# Patient Record
Sex: FEMALE | Race: WHITE | NOT HISPANIC OR LATINO | Employment: UNEMPLOYED | URBAN - METROPOLITAN AREA
[De-identification: names, ages, dates, MRNs, and addresses within clinical notes are randomized per-mention and may not be internally consistent; named-entity substitution may affect disease eponyms.]

---

## 2017-02-21 ENCOUNTER — OFFICE VISIT (OUTPATIENT)
Dept: MEDICAL GROUP | Facility: MEDICAL CENTER | Age: 37
End: 2017-02-21
Attending: NURSE PRACTITIONER
Payer: MEDICAID

## 2017-02-21 VITALS
SYSTOLIC BLOOD PRESSURE: 100 MMHG | RESPIRATION RATE: 16 BRPM | OXYGEN SATURATION: 96 % | HEART RATE: 88 BPM | BODY MASS INDEX: 42.36 KG/M2 | HEIGHT: 69 IN | WEIGHT: 286 LBS | DIASTOLIC BLOOD PRESSURE: 60 MMHG | TEMPERATURE: 97.7 F

## 2017-02-21 DIAGNOSIS — R13.10 DYSPHAGIA, UNSPECIFIED TYPE: ICD-10-CM

## 2017-02-21 DIAGNOSIS — Z98.890: ICD-10-CM

## 2017-02-21 DIAGNOSIS — R09.81 NASAL CONGESTION: ICD-10-CM

## 2017-02-21 DIAGNOSIS — K22.0 CRICOPHARYNGEAL ACHALASIA: ICD-10-CM

## 2017-02-21 PROCEDURE — 99214 OFFICE O/P EST MOD 30 MIN: CPT | Performed by: NURSE PRACTITIONER

## 2017-02-21 RX ORDER — ZOLPIDEM TARTRATE 5 MG/1
TABLET ORAL
COMMUNITY
Start: 2017-02-07 | End: 2017-11-30

## 2017-02-21 RX ORDER — TIOTROPIUM BROMIDE 18 UG/1
CAPSULE ORAL; RESPIRATORY (INHALATION)
Qty: 30 CAP | Refills: 11 | Status: SHIPPED | OUTPATIENT
Start: 2017-02-21 | End: 2017-06-13

## 2017-02-21 RX ORDER — GABAPENTIN 100 MG/1
CAPSULE ORAL
COMMUNITY
Start: 2017-02-07 | End: 2017-11-30

## 2017-02-21 RX ORDER — PRAZOSIN HYDROCHLORIDE 5 MG/1
10 CAPSULE ORAL EVERY EVENING
COMMUNITY
Start: 2017-02-10 | End: 2023-11-26

## 2017-02-21 ASSESSMENT — PAIN SCALES - GENERAL: PAINLEVEL: NO PAIN

## 2017-02-21 NOTE — MR AVS SNAPSHOT
"        Tracy Calderon   2017 12:50 PM   Office Visit   MRN: [id number]    [facility]   Dept Phone:  [phone]    Description:  Female : 1980   Provider:  MARY CARMEN SimpsonPMASON           Reason for Visit     Breathing Problem pt.states having problem breathing on \"one side of nose\"      Allergies as of 2017     Allergen Noted Reactions    Penicillins 2007   Hives    Vicodin [Hydrocodone-Acetaminophen] 2015       'WIRES ME, DO NOT LIKE HOW IT MAKES ME FEEL',HYPERACTIVITY      You were diagnosed with     Nasal congestion   [126794]       S/P operation on nasal septum   [744952]       Dysphagia, unspecified type   [2240108]       Cricopharyngeal achalasia   [272572]         Vital Signs     Blood Pressure Pulse Temperature Respirations Height Weight    100/60 mmHg 88 36.5 °C (97.7 °F) 16 1.753 m (5' 9.02\") 129.729 kg (286 lb)    Body Mass Index Oxygen Saturation Last Menstrual Period Breastfeeding? Smoking Status       42.22 kg/m2 96% 2017 No Current Every Day Smoker       Basic Information     Date Of Birth Sex Race Ethnicity Preferred Language    1980 Female White Non- English      Problem List              ICD-10-CM Priority Class Noted - Resolved    GERD (gastroesophageal reflux disease) K21.9   2013 - Present    Tobacco abuse Z72.0   2013 - Present    Chest wall pain R07.89   2013 - Present    Obesity E66.9   2013 - Present    COPD (chronic obstructive pulmonary disease) (CMS-HCC) J44.9   2015 - Present    Anxiety F41.9   2015 - Present    History of substance abuse Z87.898   2015 - Present    Hyperlipidemia E78.5   2015 - Present    S/P nasal septoplasty Z98.890   2015 - Present    Bilateral edema of lower extremity R60.0   8/10/2015 - Present    Nocturnal sleep-related eating disorder G47.8   2015 - Present      Health Maintenance        Date Due Completion Dates    IMM DTaP/Tdap/Td " Vaccine (1 - Tdap) 8/24/2021 (Originally 8/25/2011) 8/24/2011    PAP SMEAR 3/31/2018 3/31/2015, 3/31/2015            Current Immunizations     Influenza TIV (IM) 10/29/2013    Influenza Vaccine Quad Inj (Pf) 1/28/2016  1:09 PM    Influenza Vaccine Quad Inj (Preserved) 10/20/2016, 10/28/2014    Pneumococcal polysaccharide vaccine (PPSV-23) 11/23/2016    TD Vaccine 8/24/2011 10:36 AM      Below and/or attached are the medications your provider expects you to take. Review all of your home medications and newly ordered medications with your provider and/or pharmacist. Follow medication instructions as directed by your provider and/or pharmacist. Please keep your medication list with you and share with your provider. Update the information when medications are discontinued, doses are changed, or new medications (including over-the-counter products) are added; and carry medication information at all times in the event of emergency situations     Allergies:  PENICILLINS - Hives     VICODIN - (reactions not documented)               Medications  Valid as of: February 21, 2017 -  1:19 PM    Generic Name Brand Name Tablet Size Instructions for use    Albuterol Sulfate (Nebu Soln) PROVENTIL 2.5mg/3ml 3 mL by Nebulization route every four hours as needed for Shortness of Breath.        Albuterol Sulfate (Aero Soln) albuterol 108 (90 BASE) MCG/ACT Inhale 2 Puffs by mouth every 6 hours as needed for Shortness of Breath.        Atorvastatin Calcium (Tab) LIPITOR 40 MG TAKE ONE TABLET BY MOUTH IN THE EVENING (INCREASED  DOSE)        Citalopram Hydrobromide (Tab) CELEXA 40 MG         Furosemide (Tab) LASIX 20 MG TAKE ONE TABLET BY MOUTH ONCE DAILY        Gabapentin (Cap) NEURONTIN 100 MG         HydrOXYzine HCl (Tab) ATARAX 25 MG Take 25 mg by mouth 2 Times a Day.        Methocarbamol (Tab) ROBAXIN 500 MG TAKE ONE TABLET BY MOUTH TWICE DAILY AS NEEDED FOR  MUSCLE  TENSION/PAIN        Mirtazapine (Tab) REMERON 15 MG         Nicotine  Polacrilex (Gum) NICORETTE 4 MG Take 4 mg by mouth every 2 hours as needed.        Prazosin HCl (Cap) MINIPRESS 5 MG         RaNITidine HCl (Tab) ZANTAC 150 MG TAKE ONE TABLET BY MOUTH TWICE DAILY WITH MEALS        Tiotropium Bromide Monohydrate (Cap) SPIRIVA 18 MCG INHALE ONE DOSE BY MOUTH ONCE DAILY        Triamcinolone Acetonide (Aerosol) NASACORT 55 MCG/ACT [place] 2 Sprays in nose every day.        Zolpidem Tartrate (Tab) AMBIEN 5 MG         .                 Medicines prescribed today were sent to:     [facility] [address] [facility] ([place]), [place] - [address]    [address] [address] ([place]) [place] [address]    Phone: [phone] Fax: [phone]    Open 24 Hours?: No      Medication refill instructions:       If your prescription bottle indicates you have medication refills left, it is not necessary to call your provider’s office. Please contact your pharmacy and they will refill your medication.    If your prescription bottle indicates you do not have any refills left, you may request refills at any time through one of the following ways: The online [employer] system (except Urgent Care), by calling your provider’s office, or by asking your pharmacy to contact your provider’s office with a refill request. Medication refills are processed only during regular business hours and may not be available until the next business day. Your provider may request additional information or to have a follow-up visit with you prior to refilling your medication.   *Please Note: Medication refills are assigned a new Rx number when refilled electronically. Your pharmacy may indicate that no refills were authorized even though a new prescription for the same medication is available at the pharmacy. Please request the medicine by name with the pharmacy before contacting your provider for a refill.           MyChart Status: Patient Declined

## 2017-02-21 NOTE — PROGRESS NOTES
"Subjective:     Chief Complaint   Patient presents with   • Nasal Congestion     R side x 10 days     Tracy Calderon is a 36 y.o. female here today with 2 concerns.       She reports right sided nasal congestion that has been coming and going for 10 days. She denies URI or cold symptoms during this time. She denies sneezing or nasal drainage. She is not taking any nasal sprays and has not tried anything for this problem. She does have a history a deviated septum and underwent surgery to repair this a couple of years ago.     She also reports ongoing problems with swallowing. She states she feels like food gets stuck in her chest area and it causes her to nearly choke. She states this occurs with solids but also with liquids if they are very cold. These symptoms have been present for several years and she had a barium swallow in the past that showed \"Poor relaxation of the cricopharyngeus muscle, possibly representing cricopharyngeal achalasia.\" She was seen by Dr. Pierre for this problem and had a minor surgery but states that her symptoms never improved. She does not recall having an upper endoscopy. She reports she has not followed up since.    The patient has known COPD. +tobacco abuse. Pt reporting mild SOB with exertion. Pt reports taking inhalers as prescribed. Denies cough, severe SOB, CP, recent illness, fevers/chills. She is requesting a refill of Spiriva today.      Current medicines (including changes today)  Current Outpatient Prescriptions   Medication Sig Dispense Refill   • tiotropium (SPIRIVA HANDIHALER) 18 MCG Cap INHALE ONE DOSE BY MOUTH ONCE DAILY 30 Cap 11   • gabapentin (NEURONTIN) 100 MG Cap      • prazosin (MINIPRESS) 5 MG Cap      • zolpidem (AMBIEN) 5 MG Tab      • atorvastatin (LIPITOR) 40 MG Tab TAKE ONE TABLET BY MOUTH IN THE EVENING (INCREASED  DOSE) 30 Tab 3   • methocarbamol (ROBAXIN) 500 MG Tab TAKE ONE TABLET BY MOUTH TWICE DAILY AS NEEDED FOR  MUSCLE  TENSION/PAIN 60 Tab 3 " "  • furosemide (LASIX) 20 MG Tab TAKE ONE TABLET BY MOUTH ONCE DAILY 30 Tab 3   • triamcinolone (NASACORT) 55 MCG/ACT nasal inhaler Spray 2 Sprays in nose every day. 1 Bottle 3   • citalopram (CELEXA) 40 MG Tab      • mirtazapine (REMERON) 15 MG Tab      • hydrOXYzine (ATARAX) 25 MG Tab Take 25 mg by mouth 2 Times a Day.     • ranitidine (ZANTAC) 150 MG Tab TAKE ONE TABLET BY MOUTH TWICE DAILY WITH MEALS 60 Tab 6   • albuterol (PROVENTIL) 2.5mg/3ml Nebu Soln solution for nebulization 3 mL by Nebulization route every four hours as needed for Shortness of Breath. 75 mL 3   • albuterol 108 (90 BASE) MCG/ACT Aero Soln inhalation aerosol Inhale 2 Puffs by mouth every 6 hours as needed for Shortness of Breath. 8.5 g 3   • nicotine polacrilex (NICORETTE) 4 MG gum Take 4 mg by mouth every 2 hours as needed. 270 Each 3     No current facility-administered medications for this visit.     She  has a past medical history of ETOH abuse; Anxiety; COPD (chronic obstructive pulmonary disease) (CMS-[facility]) (2/17/2015); History of substance abuse (2/17/2015); Hyperlipidemia (2/17/2015); Hyperlipidemia (2/17/2015); Cricopharyngeal achalasia (3/12/2015); Heart burn; Indigestion; Breath shortness; Snoring; Asthma; Anesthesia; Psychiatric disorder; Bipolar disorder (CMS-HCC); and Depression. She also has no past medical history of Hypertension or Diabetes.      Current medications, allergies and problems list reviewed and updated in [employer].      ROS   Review of systems as documented above in history of present illness.         Objective:     Blood pressure 100/60, pulse 88, temperature 36.5 °C (97.7 °F), resp. rate 16, height 1.753 m (5' 9.02\"), weight 129.729 kg (286 lb), last menstrual period 02/16/2017, SpO2 96 %, not currently breastfeeding. Body mass index is 42.22 kg/(m^2).     Physical Exam:  Alert, oriented in no acute distress.  Eye contact is good, speech goal directed, affect calm  HEENT: conjunctiva non-injected, sclera " non-icteric.  Pinna normal. TM pearly gray.   Oral mucous membranes pink and moist with no lesions.  +right sided edematous nasal mucosa noted.  Neck: No adenopathy or masses in the neck or supraclavicular regions. No JVD. No thyromegaly.  Lungs: clear to auscultation bilaterally with good excursion.  CV: regular rate and rhythm.  Ext: no edema  Skin: no rashes or lesions in visible areas.        Assessment and Plan:   The following treatment plan was discussed     1. Nasal congestion  This is a new problem    Recommended that she try Nasacort (as previously prescribed) 2 sprays in the affected nostril for 5-7 days.     2. S/P operation on nasal septum     3. Dysphagia, unspecified type  Not controlled.  She reports ongoing difficulty swallowing with past findings of cricopharyngeal achalasia. She had a minor surgery or injection and I have told her that she should FU with Dr. Pierre as this likely needs to be repeated.  Recommended soft diet with choking precautions until seen. Avoid very cold fluids if this aggravates symptoms.      4. Cricopharyngeal achalasia  As above.         Followup: Return in about 3 months (around 5/21/2017), or if symptoms worsen or fail to improve.          Please note that this dictation was created using voice recognition software. Every reasonable attempt has been made to correct obvious errors, however there may be errors of grammar and possibly content that were not discovered before finalizing the note.

## 2017-03-07 ENCOUNTER — OFFICE VISIT (OUTPATIENT)
Dept: MEDICAL GROUP | Facility: MEDICAL CENTER | Age: 37
End: 2017-03-07
Attending: NURSE PRACTITIONER
Payer: MEDICAID

## 2017-03-07 VITALS
SYSTOLIC BLOOD PRESSURE: 104 MMHG | HEART RATE: 92 BPM | RESPIRATION RATE: 20 BRPM | OXYGEN SATURATION: 93 % | TEMPERATURE: 97.3 F | BODY MASS INDEX: 42.65 KG/M2 | HEIGHT: 69 IN | DIASTOLIC BLOOD PRESSURE: 76 MMHG | WEIGHT: 288 LBS

## 2017-03-07 DIAGNOSIS — J44.9 CHRONIC OBSTRUCTIVE PULMONARY DISEASE, UNSPECIFIED COPD TYPE (HCC): ICD-10-CM

## 2017-03-07 DIAGNOSIS — E66.01 MORBID OBESITY WITH BMI OF 40.0-44.9, ADULT (HCC): ICD-10-CM

## 2017-03-07 DIAGNOSIS — M54.50 ACUTE MIDLINE LOW BACK PAIN WITHOUT SCIATICA: ICD-10-CM

## 2017-03-07 DIAGNOSIS — R06.83 SNORING: ICD-10-CM

## 2017-03-07 DIAGNOSIS — R40.0 DAYTIME SOMNOLENCE: ICD-10-CM

## 2017-03-07 DIAGNOSIS — M54.9 UPPER BACK PAIN: ICD-10-CM

## 2017-03-07 PROCEDURE — 99214 OFFICE O/P EST MOD 30 MIN: CPT | Performed by: NURSE PRACTITIONER

## 2017-03-07 NOTE — MR AVS SNAPSHOT
"        Tracy Haynesin   3/7/2017 1:50 PM   Office Visit   MRN: [id number]    [facility]   Dept Phone:  [phone]    Description:  Female : 1980   Provider:  ZORA Simpson           Reason for Visit     Back Pain x 2 months    Referral Needed sleep studies      Allergies as of 3/7/2017     Allergen Noted Reactions    Penicillins 2007   Hives    Vicodin [Hydrocodone-Acetaminophen] 2015       'WIRES ME, DO NOT LIKE HOW IT MAKES ME FEEL',HYPERACTIVITY      You were diagnosed with     Acute midline low back pain without sciatica   [7070550]       Upper back pain   [4318970]       Morbid obesity with BMI of 40.0-44.9, adult (CMS-HCC)   [510461]       Snoring   [569282]       Daytime somnolence   [000463]       Chronic obstructive pulmonary disease, unspecified COPD type (CMS-HCC)   [1627623]         Vital Signs     Blood Pressure Pulse Temperature Respirations Height Weight    104/76 mmHg 92 36.3 °C (97.3 °F) 20 1.753 m (5' 9.02\") 130.636 kg (288 lb)    Body Mass Index Oxygen Saturation Last Menstrual Period Breastfeeding? Smoking Status       42.51 kg/m2 93% 2017 No Current Every Day Smoker       Basic Information     Date Of Birth Sex Race Ethnicity Preferred Language    1980 Female White Non- English      Problem List              ICD-10-CM Priority Class Noted - Resolved    GERD (gastroesophageal reflux disease) K21.9   2013 - Present    Tobacco abuse Z72.0   2013 - Present    Chest wall pain R07.89   2013 - Present    COPD (chronic obstructive pulmonary disease) (CMS-HCC) J44.9   2015 - Present    Anxiety F41.9   2015 - Present    History of substance abuse Z87.898   2015 - Present    Hyperlipidemia E78.5   2015 - Present    S/P nasal septoplasty Z98.890   2015 - Present    Bilateral edema of lower extremity R60.0   8/10/2015 - Present    Nocturnal sleep-related eating disorder G47.8   2015 - " Present    Morbid obesity with BMI of 40.0-44.9, adult (CMS-[facility]) E66.01, Z68.41   3/7/2017 - Present      Health Maintenance        Date Due Completion Dates    IMM DTaP/Tdap/Td Vaccine (1 - Tdap) 8/24/2021 (Originally 8/25/2011) 8/24/2011    PAP SMEAR 3/31/2018 3/31/2015, 3/31/2015            Current Immunizations     Influenza TIV (IM) 10/29/2013    Influenza Vaccine Quad Inj (Pf) 1/28/2016  1:09 PM    Influenza Vaccine Quad Inj (Preserved) 10/20/2016, 10/28/2014    Pneumococcal polysaccharide vaccine (PPSV-23) 11/23/2016    TD Vaccine 8/24/2011 10:36 AM      Below and/or attached are the medications your provider expects you to take. Review all of your home medications and newly ordered medications with your provider and/or pharmacist. Follow medication instructions as directed by your provider and/or pharmacist. Please keep your medication list with you and share with your provider. Update the information when medications are discontinued, doses are changed, or new medications (including over-the-counter products) are added; and carry medication information at all times in the event of emergency situations     Allergies:  PENICILLINS - Hives     VICODIN - (reactions not documented)               Medications  Valid as of: March 07, 2017 -  2:21 PM    Generic Name Brand Name Tablet Size Instructions for use    Albuterol Sulfate (Nebu Soln) PROVENTIL 2.5mg/3ml 3 mL by Nebulization route every four hours as needed for Shortness of Breath.        Albuterol Sulfate (Aero Soln) albuterol 108 (90 BASE) MCG/ACT Inhale 2 Puffs by mouth every 6 hours as needed for Shortness of Breath.        Atorvastatin Calcium (Tab) LIPITOR 40 MG TAKE ONE TABLET BY MOUTH IN THE EVENING (INCREASED  DOSE)        Citalopram Hydrobromide (Tab) CELEXA 40 MG         Furosemide (Tab) LASIX 20 MG TAKE ONE TABLET BY MOUTH ONCE DAILY        Gabapentin (Cap) NEURONTIN 100 MG         HydrOXYzine HCl (Tab) ATARAX 25 MG Take 25 mg by mouth 2 Times a  Day.        Methocarbamol (Tab) ROBAXIN 500 MG TAKE ONE TABLET BY MOUTH TWICE DAILY AS NEEDED FOR  MUSCLE  TENSION/PAIN        Mirtazapine (Tab) REMERON 15 MG         Nicotine Polacrilex (Gum) NICORETTE 4 MG Take 4 mg by mouth every 2 hours as needed.        Prazosin HCl (Cap) MINIPRESS 5 MG         RaNITidine HCl (Tab) ZANTAC 150 MG TAKE ONE TABLET BY MOUTH TWICE DAILY WITH MEALS        Tiotropium Bromide Monohydrate (Cap) SPIRIVA 18 MCG INHALE ONE DOSE BY MOUTH ONCE DAILY        Triamcinolone Acetonide (Aerosol) NASACORT 55 MCG/ACT [place] 2 Sprays in nose every day.        Zolpidem Tartrate (Tab) AMBIEN 5 MG         .                 Medicines prescribed today were sent to:     [facility] [address] [place] [address] ([place]), [place] - [facility] [facility] [place] ([place]) [place] [address]    Phone: [phone] Fax: [phone]    Open 24 Hours?: No      Medication refill instructions:       If your prescription bottle indicates you have medication refills left, it is not necessary to call your provider’s office. Please contact your pharmacy and they will refill your medication.    If your prescription bottle indicates you do not have any refills left, you may request refills at any time through one of the following ways: The online [employer] system (except Urgent Care), by calling your provider’s office, or by asking your pharmacy to contact your provider’s office with a refill request. Medication refills are processed only during regular business hours and may not be available until the next business day. Your provider may request additional information or to have a follow-up visit with you prior to refilling your medication.   *Please Note: Medication refills are assigned a new Rx number when refilled electronically. Your pharmacy may indicate that no refills were authorized even though a new prescription for the same medication is available at the pharmacy. Please request the medicine by name with the pharmacy before contacting  your provider for a refill.        Referral     A referral request has been sent to our patient care coordination department. Please allow 3-5 business days for us to process this request and contact you either by phone or mail. If you do not hear from us by the 5th business day, please call us at [phone].           MyChart Status: Patient Declined

## 2017-03-07 NOTE — PROGRESS NOTES
Subjective:     Chief Complaint   Patient presents with   • Back Pain     x 2 months   • Referral Needed     sleep studies     Tracy Calderon is a 36 y.o. female here today for back pain.       The patient is in the clinic today reporting pain in her low back that has been present for about 2 months. She denies any associated injury. She states her pain is intermittent and is brought on by activity like washing the dishes or standing. She has tried Aleve with only mild relief. She is now also noticing a burning pain in her upper back between her shoulder blades that also began without injury in the last couple of weeks. She has gained over 30 lbs in the last several months.    She is also requesting a referral for a sleep study. Her psychiatrist recommended that a sleep study be ordered. She reports that she snores loudly at night, she has daytime somnolence and frequent nocturnal awakenings with shortness of breath. She has known COPD and continues to smoke.          Current medicines (including changes today)  Current Outpatient Prescriptions   Medication Sig Dispense Refill   • gabapentin (NEURONTIN) 100 MG Cap      • prazosin (MINIPRESS) 5 MG Cap      • zolpidem (AMBIEN) 5 MG Tab      • tiotropium (SPIRIVA HANDIHALER) 18 MCG Cap INHALE ONE DOSE BY MOUTH ONCE DAILY 30 Cap 11   • atorvastatin (LIPITOR) 40 MG Tab TAKE ONE TABLET BY MOUTH IN THE EVENING (INCREASED  DOSE) 30 Tab 3   • methocarbamol (ROBAXIN) 500 MG Tab TAKE ONE TABLET BY MOUTH TWICE DAILY AS NEEDED FOR  MUSCLE  TENSION/PAIN 60 Tab 3   • furosemide (LASIX) 20 MG Tab TAKE ONE TABLET BY MOUTH ONCE DAILY 30 Tab 3   • triamcinolone (NASACORT) 55 MCG/ACT nasal inhaler Spray 2 Sprays in nose every day. 1 Bottle 3   • citalopram (CELEXA) 40 MG Tab      • mirtazapine (REMERON) 15 MG Tab      • hydrOXYzine (ATARAX) 25 MG Tab Take 25 mg by mouth 2 Times a Day.     • ranitidine (ZANTAC) 150 MG Tab TAKE ONE TABLET BY MOUTH TWICE DAILY WITH MEALS 60 Tab 6  "  • albuterol (PROVENTIL) 2.5mg/3ml Nebu Soln solution for nebulization 3 mL by Nebulization route every four hours as needed for Shortness of Breath. 75 mL 3   • albuterol 108 (90 BASE) MCG/ACT Aero Soln inhalation aerosol Inhale 2 Puffs by mouth every 6 hours as needed for Shortness of Breath. 8.5 g 3   • nicotine polacrilex (NICORETTE) 4 MG gum Take 4 mg by mouth every 2 hours as needed. 270 Each 3     No current facility-administered medications for this visit.     She  has a past medical history of ETOH abuse; Anxiety; COPD (chronic obstructive pulmonary disease) (CMS-[facility]) (2/17/2015); History of substance abuse (2/17/2015); Hyperlipidemia (2/17/2015); Hyperlipidemia (2/17/2015); Cricopharyngeal achalasia (3/12/2015); Heart burn; Indigestion; Breath shortness; Snoring; Asthma; Anesthesia; Psychiatric disorder; Bipolar disorder (CMS-HCC); and Depression. She also has no past medical history of Hypertension or Diabetes.      Current medications, allergies and problems list reviewed and updated in [employer].      ROS   Review of systems as documented above in history of present illness.         Objective:     Blood pressure 104/76, pulse 92, temperature 36.3 °C (97.3 °F), resp. rate 20, height 1.753 m (5' 9.02\"), weight 130.636 kg (288 lb), last menstrual period 02/16/2017, SpO2 93 %, not currently breastfeeding. Body mass index is 42.51 kg/(m^2).     Physical Exam:  Alert, oriented in no acute distress.  Eye contact is good, speech goal directed, affect calm  HEENT: conjunctiva non-injected, sclera non-icteric.  Oral mucous membranes pink and moist with no lesions.  Neck: Supple.  Lungs: few coarse rhonchi noted on exam.  CV: regular rate and rhythm.  Abdomen: obese.  Ext: no edema  Skin: no rashes or lesions in visible areas.  MSK: +paraspinal muscle tension noted in lumbar spine. No bony point tenderness over vertebrae, swelling or deformity noted.       Assessment and Plan:   The following treatment plan was " discussed     1. Acute midline low back pain without sciatica   suspect her pain is muscular and likely aggravated by her recent weight gain. Supportive care was discussed but she would like to get involved in physical therapy and this referral has been placed.  REFERRAL TO PHYSICAL THERAPY Reason for Therapy: Eval/Treat/Report   2. Upper back pain    REFERRAL TO PHYSICAL THERAPY Reason for Therapy: Eval/Treat/Report   3. Morbid obesity with BMI of 40.0-44.9, adult (CMS-HCC)   we discussed her weight gain and she states that she plans to start a new diet on Monday. Regular physical exercise was also recommended.  REFERRAL TO SLEEP STUDIES   4. Snoring   will refer to sleep studies due to her constellation of symptoms concerning for sleep apnea.  REFERRAL TO SLEEP STUDIES   5. Daytime somnolence  REFERRAL TO SLEEP STUDIES   6. Chronic obstructive pulmonary disease, unspecified COPD type (CMS-HCC)  REFERRAL TO SLEEP STUDIES       Followup: Return if symptoms worsen or fail to improve.          Please note that this dictation was created using voice recognition software. Every reasonable attempt has been made to correct obvious errors, however there may be errors of grammar and possibly content that were not discovered before finalizing the note.

## 2017-03-23 ENCOUNTER — APPOINTMENT (OUTPATIENT)
Dept: PHYSICAL THERAPY | Facility: REHABILITATION | Age: 37
End: 2017-03-23
Attending: NURSE PRACTITIONER
Payer: MEDICAID

## 2017-03-26 ENCOUNTER — HOSPITAL ENCOUNTER (EMERGENCY)
Facility: MEDICAL CENTER | Age: 37
End: 2017-03-26
Attending: EMERGENCY MEDICINE
Payer: MEDICAID

## 2017-03-26 VITALS
WEIGHT: 287.04 LBS | HEART RATE: 76 BPM | TEMPERATURE: 98 F | DIASTOLIC BLOOD PRESSURE: 73 MMHG | RESPIRATION RATE: 18 BRPM | HEIGHT: 66 IN | SYSTOLIC BLOOD PRESSURE: 119 MMHG | OXYGEN SATURATION: 94 % | BODY MASS INDEX: 46.13 KG/M2

## 2017-03-26 DIAGNOSIS — J06.9 UPPER RESPIRATORY TRACT INFECTION, UNSPECIFIED TYPE: ICD-10-CM

## 2017-03-26 DIAGNOSIS — S05.01XA CORNEAL ABRASION, RIGHT, INITIAL ENCOUNTER: ICD-10-CM

## 2017-03-26 DIAGNOSIS — H10.31 ACUTE CONJUNCTIVITIS OF RIGHT EYE, UNSPECIFIED ACUTE CONJUNCTIVITIS TYPE: ICD-10-CM

## 2017-03-26 DIAGNOSIS — J44.1 ACUTE EXACERBATION OF CHRONIC OBSTRUCTIVE PULMONARY DISEASE (COPD) (HCC): ICD-10-CM

## 2017-03-26 PROCEDURE — 99284 EMERGENCY DEPT VISIT MOD MDM: CPT

## 2017-03-26 RX ORDER — ALBUTEROL SULFATE 2.5 MG/3ML
2.5 SOLUTION RESPIRATORY (INHALATION)
Status: DISCONTINUED | OUTPATIENT
Start: 2017-03-26 | End: 2017-03-26 | Stop reason: HOSPADM

## 2017-03-26 ASSESSMENT — ENCOUNTER SYMPTOMS
FEVER: 0
SHORTNESS OF BREATH: 0
DOUBLE VISION: 0
NAUSEA: 0
COUGH: 1
EYE REDNESS: 1
BLURRED VISION: 1

## 2017-03-26 ASSESSMENT — PAIN SCALES - GENERAL: PAINLEVEL_OUTOF10: 8

## 2017-03-26 NOTE — FLOWSHEET NOTE
03/26/17 1037   Therapy Not Performed   Type of Therapy Not Performed SVN   Reason Therapy Not Performed Refused

## 2017-03-26 NOTE — ED AVS SNAPSHOT
[employer] Access Code: Activation code not generated  Current [employer] Status: Patient Declined    Your email address is not on file at [facility].  Email Addresses are required for you to sign up for [employer], please contact [phone] to verify your personal information and to provide your email address prior to attempting to register for [employer].    Tracy Ember Calderon  [address]  [place] [address]    [employer]  A secure, online tool to manage your health information     [facility]’s [employer]® is a secure, online tool that connects you to your personalized health information from the privacy of your home -- day or night - making it very easy for you to manage your healthcare. Once the activation process is completed, you can even access your medical information using the [employer] tiffany, which is available for free in the Apple Tiffany store or Google Play store.     To learn more about [employer], visit [email or web address]    There are two levels of access available (as shown below):   My Chart Features  [facility] Primary Care Doctor [facility]  Specialists [facility]  Urgent  Care Non-[facility] Primary Care Doctor   Email your healthcare team securely and privately 24/7 X X X    Manage appointments: schedule your next appointment; view details of past/upcoming appointments X      Request prescription refills. X      View recent personal medical records, including lab and immunizations X X X X   View health record, including health history, allergies, medications X X X X   Read reports about your outpatient visits, procedures, consult and ER notes X X X X   See your discharge summary, which is a recap of your hospital and/or ER visit that includes your diagnosis, lab results, and care plan X X  X     How to register for [employer]:  Once your e-mail address has been verified, follow the following steps to sign up for [employer].     1. Go to  [email or web address]  2. Click on the Sign Up Now box, which takes you to the  New Member Sign Up page. You will need to provide the following information:  a. Enter your [employer] Access Code exactly as it appears at the top of this page. (You will not need to use this code after you’ve completed the sign-up process. If you do not sign up before the expiration date, you must request a new code.)   b. Enter your date of birth.   c. Enter your home email address.   d. Click Submit, and follow the next screen’s instructions.  3. Create a [employer] ID. This will be your [employer] login ID and cannot be changed, so think of one that is secure and easy to remember.  4. Create a [employer] password. You can change your password at any time.  5. Enter your Password Reset Question and Answer. This can be used at a later time if you forget your password.   6. Enter your e-mail address. This allows you to receive e-mail notifications when new information is available in [employer].  7. Click Sign Up. You can now view your health information.    For assistance activating your [employer] account, call [phone]

## 2017-03-26 NOTE — ED NOTES
Chief Complaint   Patient presents with   • Eye Pain     Pt BIB self to triage for c/o right eye pain since yesterday. Pt reports waking this way/ states light sensitivity/ vision blurry.      Explained to pt triage process, made pt aware to tell this RN of any changes/concerns, pt verbalized understanding of process and instructions given. Pt to ER jazmín.

## 2017-03-26 NOTE — DISCHARGE INSTRUCTIONS
Corneal Abrasion  The cornea is the clear covering at the front and center of the eye. When looking at the colored portion of the eye (iris), you are looking through the cornea. This very thin tissue is made up of many layers. The surface layer is a single layer of cells (corneal epithelium) and is one of the most sensitive tissues in the body. If a scratch or injury causes the corneal epithelium to come off, it is called a corneal abrasion. If the injury extends to the tissues below the epithelium, the condition is called a corneal ulcer.  CAUSES   · Scratches.  · Trauma.  · Foreign body in the eye.  Some people have recurrences of abrasions in the area of the original injury even after it has healed (recurrent erosion syndrome). Recurrent erosion syndrome generally improves and goes away with time.  SYMPTOMS   · Eye pain.  · Difficulty or inability to keep the injured eye open.  · The eye becomes very sensitive to light.  · Recurrent erosions tend to happen suddenly, first thing in the morning, usually after waking up and opening the eye.  DIAGNOSIS   Your health care provider can diagnose a corneal abrasion during an eye exam. Dye is usually placed in the eye using a drop or a small paper strip moistened by your tears. When the eye is examined with a special light, the abrasion shows up clearly because of the dye.  TREATMENT   · Small abrasions may be treated with antibiotic drops or ointment alone.  · A pressure patch may be put over the eye. If this is done, follow your doctor's instructions for when to remove the patch. Do not drive or use machines while the eye patch is on. Judging distances is hard to do with a patch on.  If the abrasion becomes infected and spreads to the deeper tissues of the cornea, a corneal ulcer can result. This is serious because it can cause corneal scarring. Corneal scars interfere with light passing through the cornea and cause a loss of vision in the involved eye.  HOME CARE  "INSTRUCTIONS  · Use medicine or ointment as directed. Only take over-the-counter or prescription medicines for pain, discomfort, or fever as directed by your health care provider.  · Do not drive or operate machinery if your eye is patched. Your ability to  distances is impaired.  · If your health care provider has given you a follow-up appointment, it is very important to keep that appointment. Not keeping the appointment could result in a severe eye infection or permanent loss of vision. If there is any problem keeping the appointment, let your health care provider know.  SEEK MEDICAL CARE IF:   · You have pain, light sensitivity, and a scratchy feeling in one eye or both eyes.  · Your pressure patch keeps loosening up, and you can blink your eye under the patch after treatment.  · Any kind of discharge develops from the eye after treatment or if the lids stick together in the morning.  · You have the same symptoms in the morning as you did with the original abrasion days, weeks, or months after the abrasion healed.  MAKE SURE YOU:   1. Understand these instructions.  2. Will watch your condition.  3. Will get help right away if you are not doing well or get worse.     This information is not intended to replace advice given to you by your health care provider. Make sure you discuss any questions you have with your health care provider.     Document Released: 12/15/2001 Document Revised: 12/23/2014 Document Reviewed: 08/25/2014  [employer] Interactive Patient Education ©2016 [employer]    Conjunctivitis  Conjunctivitis is commonly called \"pink eye.\" Conjunctivitis can be caused by bacterial or viral infection, allergies, or injuries. There is usually redness of the lining of the eye, itching, discomfort, and sometimes discharge. There may be deposits of matter along the eyelids. A viral infection usually causes a watery discharge, while a bacterial infection causes a yellowish, thick discharge. Pink eye is " very contagious and spreads by direct contact.  You may be given antibiotic eyedrops as part of your treatment. Before using your eye medicine, remove all drainage from the eye by washing gently with warm water and cotton balls. Continue to use the medication until you have awakened 2 mornings in a row without discharge from the eye. Do not rub your eye. This increases the irritation and helps spread infection. Use separate towels from other household members. Wash your hands with soap and water before and after touching your eyes. Use cold compresses to reduce pain and sunglasses to relieve irritation from light. Do not wear contact lenses or wear eye makeup until the infection is gone.  SEEK MEDICAL CARE IF:   · Your symptoms are not better after 3 days of treatment.  · You have increased pain or trouble seeing.  · The outer eyelids become very red or swollen.  Document Released: 01/25/2006 Document Revised: 03/11/2013 Document Reviewed: 12/18/2006  [employer]® Patient Information ©2014 [employer].    Chronic Obstructive Pulmonary Disease  Chronic obstructive pulmonary disease (COPD) is a common lung condition in which airflow from the lungs is limited. COPD is a general term that can be used to describe many different lung problems that limit airflow, including both chronic bronchitis and emphysema. If you have COPD, your lung function will probably never return to normal, but there are measures you can take to improve lung function and make yourself feel better.  CAUSES   · Smoking (common).  · Exposure to secondhand smoke.  · Genetic problems.  · Chronic inflammatory lung diseases or recurrent infections.  SYMPTOMS  · Shortness of breath, especially with physical activity.  · Deep, persistent (chronic) cough with a large amount of thick mucus.  · Wheezing.  · Rapid breaths (tachypnea).  · Gray or bluish discoloration (cyanosis) of the skin, especially in your fingers, toes, or lips.  · Fatigue.  · Weight  loss.  · Frequent infections or episodes when breathing symptoms become much worse (exacerbations).  · Chest tightness.  DIAGNOSIS  Your health care provider will take a medical history and perform a physical examination to diagnose COPD. Additional tests for COPD may include:  · Lung (pulmonary) function tests.  · Chest X-ray.  · CT scan.  · Blood tests.  TREATMENT   Treatment for COPD may include:  · Inhaler and nebulizer medicines. These help manage the symptoms of COPD and make your breathing more comfortable.  · Supplemental oxygen. Supplemental oxygen is only helpful if you have a low oxygen level in your blood.  · Exercise and physical activity. These are beneficial for nearly all people with COPD.  · Lung surgery or transplant.  · Nutrition therapy to gain weight, if you are underweight.  · Pulmonary rehabilitation. This may involve working with a team of health care providers and specialists, such as respiratory, occupational, and physical therapists.  HOME CARE INSTRUCTIONS  · Take all medicines (inhaled or pills) as directed by your health care provider.  · Avoid over-the-counter medicines or cough syrups that dry up your airway (such as antihistamines) and slow down the elimination of secretions unless instructed otherwise by your health care provider.  · If you are a smoker, the most important thing that you can do is stop smoking. Continuing to smoke will cause further lung damage and breathing trouble. Ask your health care provider for help with quitting smoking. He or she can direct you to community resources or hospitals that provide support.  · Avoid exposure to irritants such as smoke, chemicals, and fumes that aggravate your breathing.  · Use oxygen therapy and pulmonary rehabilitation if directed by your health care provider. If you require home oxygen therapy, ask your health care provider whether you should purchase a pulse oximeter to measure your oxygen level at home.  · Avoid contact with  individuals who have a contagious illness.  · Avoid extreme temperature and humidity changes.  · Eat healthy foods. Eating smaller, more frequent meals and resting before meals may help you maintain your strength.  · Stay active, but balance activity with periods of rest. Exercise and physical activity will help you maintain your ability to do things you want to do.  · Preventing infection and hospitalization is very important when you have COPD. Make sure to receive all the vaccines your health care provider recommends, especially the pneumococcal and influenza vaccines. Ask your health care provider whether you need a pneumonia vaccine.  · Learn and use relaxation techniques to manage stress.  · Learn and use controlled breathing techniques as directed by your health care provider. Controlled breathing techniques include:  ¨ Pursed lip breathing. Start by breathing in (inhaling) through your nose for 1 second. Then, purse your lips as if you were going to whistle and breathe out (exhale) through the pursed lips for 2 seconds.  ¨ Diaphragmatic breathing. Start by putting one hand on your abdomen just above your waist. Inhale slowly through your nose. The hand on your abdomen should move out. Then purse your lips and exhale slowly. You should be able to feel the hand on your abdomen moving in as you exhale.  · Learn and use controlled coughing to clear mucus from your lungs. Controlled coughing is a series of short, progressive coughs. The steps of controlled coughing are:  4. Lean your head slightly forward.  5. Breathe in deeply using diaphragmatic breathing.  6. Try to hold your breath for 3 seconds.  7. Keep your mouth slightly open while coughing twice.  8. Spit any mucus out into a tissue.  9. Rest and repeat the steps once or twice as needed.  SEEK MEDICAL CARE IF:  · You are coughing up more mucus than usual.  · There is a change in the color or thickness of your mucus.  · Your breathing is more labored than  usual.  · Your breathing is faster than usual.  SEEK IMMEDIATE MEDICAL CARE IF:  · You have shortness of breath while you are resting.  · You have shortness of breath that prevents you from:  ¨ Being able to talk.  ¨ Performing your usual physical activities.  · You have chest pain lasting longer than 5 minutes.  · Your skin color is more cyanotic than usual.  · You measure low oxygen saturations for longer than 5 minutes with a pulse oximeter.  MAKE SURE YOU:  · Understand these instructions.  · Will watch your condition.  · Will get help right away if you are not doing well or get worse.     This information is not intended to replace advice given to you by your health care provider. Make sure you discuss any questions you have with your health care provider.     Document Released: 09/27/2006 Document Revised: 01/08/2016 Document Reviewed: 08/14/2014  [employer] Interactive Patient Education ©2016 [employer]

## 2017-03-26 NOTE — ED PROVIDER NOTES
ED Provider Note    Scribed for Brandee Luna D.O. by Dl Mejia. 3/26/2017, 10:02 AM.    Primary care provider: ZORA Simpson  Means of arrival: Walk in  History obtained from: Patient  History limited by: None    CHIEF COMPLAINT  Chief Complaint   Patient presents with   • Eye Pain     Pt BIB self to triage for c/o right eye pain since yesterday. Pt reports waking this way/ states light sensitivity/ vision blurry.        HPI  Tracy Calderon is a 36 y.o. female with a history of asthma and COPD who presents to the Emergency Department complaining of sudden right eye pain onset yesterday morning upon waking. She notes no previous trauma or triggering events such as wind exposure, sand exposure or welding. Patient denies rubbing her eye prior to onset of symptoms. She notes associated swelling around her upper eyelid on the right.  she reports eye diffuse blurred vision, and drainage from the eye. She denies sensation of foreign body, double vision, and shortness of breath. Patient medicates with albuterol and is not on oxygen at home. She has a history of panic attacks. Patient's tetanus shot is up-to-date.      REVIEW OF SYSTEMS  Review of Systems   Constitutional: Negative for fever.   HENT: Positive for congestion.         Positive pain/sensitivity to forehead   Eyes: Positive for blurred vision and redness. Negative for double vision. Eye pain: right.        Positive swelling surrounding right eye. Positive drainage.  Negative FB sensation   Respiratory: Positive for cough. Negative for shortness of breath.    Gastrointestinal: Negative for nausea.       PAST MEDICAL HISTORY   has a past medical history of ETOH abuse; Anxiety; COPD (chronic obstructive pulmonary disease) (CMS-HCC) (2/17/2015); History of substance abuse (2/17/2015); Hyperlipidemia (2/17/2015); Hyperlipidemia (2/17/2015); Cricopharyngeal achalasia (3/12/2015); Heart burn; Indigestion; Breath shortness; Snoring; Asthma;  "Anesthesia; Psychiatric disorder; Bipolar disorder (CMS-[facility]); and Depression.    SURGICAL HISTORY   has past surgical history that includes tonsillectomy; septoplasty (4/24/2015); turbinoplasty (4/24/2015); and esophagoscopy (4/24/2015).    SOCIAL HISTORY  Social History   Substance Use Topics   • Smoking status: Current Every Day Smoker -- 0.50 packs/day for 15 years     Types: Cigarettes   • Smokeless tobacco: Never Used   • Alcohol Use: No      Comment: h/o heavy drinking x 16 years. quit drinking in May 2013      History   Drug Use   • Yes   • Special: Marijuana, Inhaled     Comment: history of meth use, quit 2007, marijuana daily       FAMILY HISTORY  Family History   Problem Relation Age of Onset   • Lung Disease Mother    • Stroke Mother    • Cancer Neg Hx    • Diabetes Neg Hx    • Heart Disease Maternal Grandfather        CURRENT MEDICATIONS  Home Medications     **Home medications have not yet been reviewed for this encounter**          ALLERGIES  Allergies   Allergen Reactions   • Penicillins Hives   • Vicodin [Hydrocodone-Acetaminophen]      'WIRES ME, DO NOT LIKE HOW IT MAKES ME FEEL',HYPERACTIVITY       PHYSICAL EXAM  VITAL SIGNS: /73 mmHg  Pulse 64  Temp(Src) 36.7 °C (98 °F)  Resp 18  Ht 1.676 m (5' 6\")  Wt 130.2 kg (287 lb 0.6 oz)  BMI 46.35 kg/m2  SpO2 93%  LMP 02/16/2017 (Exact Date)  Vitals reviewed.  Constitutional: Patient is oriented to person, place, and time. Appears well-developed and well-nourished. No distress.    Eyes: right - upper eye lid is mildly swollen. Conjunctiva hyperemic. Negative Xavi sign. No hyphema. No FB sensation. No double vision. Left eye appears normal.  Cardiovascular: Normal rate, regular rhythm and normal heart sounds.   Pulmonary/Chest: Effort normal. No respiratory distress, rhonchi, or rales. Diffuse scant wheezes  Musculoskeletal: No edema   Skin: Skin is warm and dry. No erythema. No pallor.   Psychiatric: Patient has a normal mood and affect. "     Eye Foreign Body Procedure    Indication: eye irritation    Procedure: The patient's head was positioned appropriately to provide adequate exposure of the right eye using direct/woodslamp visualization.  Anesthesia was obtained using proparacaine drops.  Fluorescein staining was performed in the right eye and revealed a corneal abrasion of about 1 mm at the in the middle of the right eye.  No foreign body was identified.     The patient tolerated the procedure well.    Complications: None     COURSE & MEDICAL DECISION MAKING  Pertinent Labs & Imaging studies reviewed. (See chart for details)    Obtained and reviewed past medical records which shows numerous outpatient visits but no prior ED visits.      10:02 AM - Patient seen and examined at bedside. Patient will be treated with Proventil 2.5 mg. Ordered ER eye box to evaluate her symptoms. Patient does have some scant wheezes on exam. She denies feeling short of breath. She reports just having URI symptoms. She reports that she has a nebulizer at home with adequate medication and she is essentially feeling her normal baseline in terms of her breathing. The differential diagnoses include but are not limited to: conjunctivitis, corneal abrasion, foreign body, COPD exacerbation, URI     10:24 AM Patient reevaluated at bedside. I performed Eye Foreign Body Procedure at this time, see above.    10:39 AM Patient reevaluated at bedside. She refused breathing treatment, stating that she feels fine from a respiratory standpoint. The patient will be discharged with antibiotic ointment for her eye and should return if symptoms worsen or if new symptoms arise. Her tetanus shot is up-to-date. I've advised follow-up with her primary care doctor. She is also given strict return precautions. The patient understands and agrees to plan.          The patient is referred to a primary physician for blood pressure management, diabetic screening, and for all other preventative  health concerns.    DISPOSITION:  Patient will be discharged home in stable condition.    FOLLOW UP:  ZORA Simpson  [address]  [address]  [facility] [id number] [phone]    In 2 days      [facility], Emergency Dept  [address] [facility]  [place] [id number] [phone]    If symptoms worsen      OUTPATIENT MEDICATIONS:  New Prescriptions    TOBRAMYCIN (TOBREX) 0.3 % OINTMENT OPHTHALMIC OINTMENT    Place 0.5 Inches in right eye 3 times a day for 7 days.          FINAL IMPRESSION  Eye Foreign Body Procedure, see above.    1. Acute conjunctivitis of right eye, unspecified acute conjunctivitis type    2. Corneal abrasion, right, initial encounter    3. Upper respiratory tract infection, unspecified type    4. Acute exacerbation of chronic obstructive pulmonary disease (COPD) (CMS-[facility])          Dl OSULLIVAN (Scribe), am scribing for, and in the presence of, Brandee Luna D.O..    Electronically signed by: Dl Mejia (Scribe), 3/26/2017    Brandee OSULLIVAN D.O. personally performed the services described in this documentation, as scribed by Dl Mejia in my presence, and it is both accurate and complete.    The note accurately reflects work and decisions made by me.  Brandee Luna  3/26/2017  10:54 AM

## 2017-03-26 NOTE — ED NOTES
DC paperwork reviewed and signed.  1 prescription(s) given to patient. Pt denies any further questions at this time; pt ambulated independently to lobby with mother

## 2017-03-26 NOTE — ED AVS SNAPSHOT
Home Care Instructions                                                                                                                Tracy Calderon   MRN: [id number]    Department:  [facility], Emergency Dept   Date of Visit:  3/26/2017            [facility], Emergency Dept    [address] [facility] [address]    Phone:  [phone]      You were seen by     Brandee Luan D.O.      Your Diagnosis Was     Acute conjunctivitis of right eye, unspecified acute conjunctivitis type     H10.31       These are the medications you received during your hospitalization from 03/26/2017 0904 to 03/26/2017 1049     Date/Time Order Dose Route Action    03/26/2017 1030 albuterol (PROVENTIL) 2.5mg/3ml nebulizer solution 2.5 mg 2.5 mg Nebulization Refused      Follow-up Information     1. Follow up with ZORA Simpson In 2 days.    Specialty:  Family Medicine    Contact information    [address] [address] [phone]          2. Follow up with [facility], Emergency Dept.    Specialty:  Emergency Medicine    Why:  If symptoms worsen    Contact information    [address] [place] [address] [phone]      Medication Information     Review all of your home medications and newly ordered medications with your primary doctor and/or pharmacist as soon as possible. Follow medication instructions as directed by your doctor and/or pharmacist.     Please keep your complete medication list with you and share with your physician. Update the information when medications are discontinued, doses are changed, or new medications (including over-the-counter products) are added; and carry medication information at all times in the event of emergency situations.               Medication List      START taking these medications        Instructions    Morning Afternoon Evening Bedtime    tobramycin 0.3 % Oint ophthalmic ointment   Commonly  known as:  TOBREX        Place 0.5 Inches in right eye 3 times a day for 7 days.   Dose:  0.5 Inch                          ASK your doctor about these medications        Instructions    Morning Afternoon Evening Bedtime    * albuterol 2.5mg/3ml Nebu solution for nebulization   Commonly known as:  PROVENTIL        3 mL by Nebulization route every four hours as needed for Shortness of Breath.   Dose:  2.5 mg                        * albuterol 108 (90 BASE) MCG/ACT Aers inhalation aerosol        Inhale 2 Puffs by mouth every 6 hours as needed for Shortness of Breath.   Dose:  2 Puff                        atorvastatin 40 MG Tabs   Commonly known as:  LIPITOR        TAKE ONE TABLET BY MOUTH IN THE EVENING (INCREASED  DOSE)                        citalopram 40 MG Tabs   Commonly known as:  CELEXA                             furosemide 20 MG Tabs   Commonly known as:  LASIX        TAKE ONE TABLET BY MOUTH ONCE DAILY                        gabapentin 100 MG Caps   Commonly known as:  NEURONTIN                             hydrOXYzine 25 MG Tabs   Commonly known as:  ATARAX        Take 25 mg by mouth 2 Times a Day.   Dose:  25 mg                        methocarbamol 500 MG Tabs   Commonly known as:  ROBAXIN        TAKE ONE TABLET BY MOUTH TWICE DAILY AS NEEDED FOR  MUSCLE  TENSION/PAIN                        mirtazapine 15 MG Tabs   Commonly known as:  REMERON                             nicotine polacrilex 4 MG gum   Commonly known as:  NICORETTE        Doctor's comments:  They can chew one piece of gum every 1 to 2 hours for six weeks, with a gradual reduction over a second six weeks, for a total duration of three months.   Take 4 mg by mouth every 2 hours as needed.   Dose:  4 mg                        prazosin 5 MG Caps   Commonly known as:  MINIPRESS                             ranitidine 150 MG Tabs   Commonly known as:  ZANTAC        TAKE ONE TABLET BY MOUTH TWICE DAILY WITH MEALS                        tiotropium 18  MCG Caps   Commonly known as:  SPIRIVA HANDIHALER        INHALE ONE DOSE BY MOUTH ONCE DAILY                        triamcinolone 55 MCG/ACT nasal inhaler   Commonly known as:  NASACORT        Doctor's comments:  She states she does not tolerate Flonase due to it causing gagging.   Spray 2 Sprays in nose every day.   Dose:  2 Spray                        zolpidem 5 MG Tabs   Commonly known as:  AMBIEN                             * Notice:  This list has 2 medication(s) that are the same as other medications prescribed for you. Read the directions carefully, and ask your doctor or other care provider to review them with you.         Where to Get Your Medications      You can get these medications from any pharmacy     Bring a paper prescription for each of these medications    - tobramycin 0.3 % Oint ophthalmic ointment            Procedures and tests performed during your visit     ER EYE BOX        Discharge Instructions       Corneal Abrasion  The cornea is the clear covering at the front and center of the eye. When looking at the colored portion of the eye (iris), you are looking through the cornea. This very thin tissue is made up of many layers. The surface layer is a single layer of cells (corneal epithelium) and is one of the most sensitive tissues in the body. If a scratch or injury causes the corneal epithelium to come off, it is called a corneal abrasion. If the injury extends to the tissues below the epithelium, the condition is called a corneal ulcer.  CAUSES   · Scratches.  · Trauma.  · Foreign body in the eye.  Some people have recurrences of abrasions in the area of the original injury even after it has healed (recurrent erosion syndrome). Recurrent erosion syndrome generally improves and goes away with time.  SYMPTOMS   · Eye pain.  · Difficulty or inability to keep the injured eye open.  · The eye becomes very sensitive to light.  · Recurrent erosions tend to happen suddenly, first thing in the  morning, usually after waking up and opening the eye.  DIAGNOSIS   Your health care provider can diagnose a corneal abrasion during an eye exam. Dye is usually placed in the eye using a drop or a small paper strip moistened by your tears. When the eye is examined with a special light, the abrasion shows up clearly because of the dye.  TREATMENT   · Small abrasions may be treated with antibiotic drops or ointment alone.  · A pressure patch may be put over the eye. If this is done, follow your doctor's instructions for when to remove the patch. Do not drive or use machines while the eye patch is on. Judging distances is hard to do with a patch on.  If the abrasion becomes infected and spreads to the deeper tissues of the cornea, a corneal ulcer can result. This is serious because it can cause corneal scarring. Corneal scars interfere with light passing through the cornea and cause a loss of vision in the involved eye.  HOME CARE INSTRUCTIONS  · Use medicine or ointment as directed. Only take over-the-counter or prescription medicines for pain, discomfort, or fever as directed by your health care provider.  · Do not drive or operate machinery if your eye is patched. Your ability to  distances is impaired.  · If your health care provider has given you a follow-up appointment, it is very important to keep that appointment. Not keeping the appointment could result in a severe eye infection or permanent loss of vision. If there is any problem keeping the appointment, let your health care provider know.  SEEK MEDICAL CARE IF:   · You have pain, light sensitivity, and a scratchy feeling in one eye or both eyes.  · Your pressure patch keeps loosening up, and you can blink your eye under the patch after treatment.  · Any kind of discharge develops from the eye after treatment or if the lids stick together in the morning.  · You have the same symptoms in the morning as you did with the original abrasion days, weeks, or  "months after the abrasion healed.  MAKE SURE YOU:   1. Understand these instructions.  2. Will watch your condition.  3. Will get help right away if you are not doing well or get worse.     This information is not intended to replace advice given to you by your health care provider. Make sure you discuss any questions you have with your health care provider.     Document Released: 12/15/2001 Document Revised: 12/23/2014 Document Reviewed: 08/25/2014  [employer] Interactive Patient Education ©2016 [employer]    Conjunctivitis  Conjunctivitis is commonly called \"pink eye.\" Conjunctivitis can be caused by bacterial or viral infection, allergies, or injuries. There is usually redness of the lining of the eye, itching, discomfort, and sometimes discharge. There may be deposits of matter along the eyelids. A viral infection usually causes a watery discharge, while a bacterial infection causes a yellowish, thick discharge. Pink eye is very contagious and spreads by direct contact.  You may be given antibiotic eyedrops as part of your treatment. Before using your eye medicine, remove all drainage from the eye by washing gently with warm water and cotton balls. Continue to use the medication until you have awakened 2 mornings in a row without discharge from the eye. Do not rub your eye. This increases the irritation and helps spread infection. Use separate towels from other household members. Wash your hands with soap and water before and after touching your eyes. Use cold compresses to reduce pain and sunglasses to relieve irritation from light. Do not wear contact lenses or wear eye makeup until the infection is gone.  SEEK MEDICAL CARE IF:   · Your symptoms are not better after 3 days of treatment.  · You have increased pain or trouble seeing.  · The outer eyelids become very red or swollen.  Document Released: 01/25/2006 Document Revised: 03/11/2013 Document Reviewed: 12/18/2006  ExitCare® Patient Information ©2014 " [email or web address] [facility].    Chronic Obstructive Pulmonary Disease  Chronic obstructive pulmonary disease (COPD) is a common lung condition in which airflow from the lungs is limited. COPD is a general term that can be used to describe many different lung problems that limit airflow, including both chronic bronchitis and emphysema. If you have COPD, your lung function will probably never return to normal, but there are measures you can take to improve lung function and make yourself feel better.  CAUSES   · Smoking (common).  · Exposure to secondhand smoke.  · Genetic problems.  · Chronic inflammatory lung diseases or recurrent infections.  SYMPTOMS  · Shortness of breath, especially with physical activity.  · Deep, persistent (chronic) cough with a large amount of thick mucus.  · Wheezing.  · Rapid breaths (tachypnea).  · Gray or bluish discoloration (cyanosis) of the skin, especially in your fingers, toes, or lips.  · Fatigue.  · Weight loss.  · Frequent infections or episodes when breathing symptoms become much worse (exacerbations).  · Chest tightness.  DIAGNOSIS  Your health care provider will take a medical history and perform a physical examination to diagnose COPD. Additional tests for COPD may include:  · Lung (pulmonary) function tests.  · Chest X-ray.  · CT scan.  · Blood tests.  TREATMENT   Treatment for COPD may include:  · Inhaler and nebulizer medicines. These help manage the symptoms of COPD and make your breathing more comfortable.  · Supplemental oxygen. Supplemental oxygen is only helpful if you have a low oxygen level in your blood.  · Exercise and physical activity. These are beneficial for nearly all people with COPD.  · Lung surgery or transplant.  · Nutrition therapy to gain weight, if you are underweight.  · Pulmonary rehabilitation. This may involve working with a team of health care providers and specialists, such as respiratory, occupational, and physical therapists.  HOME CARE INSTRUCTIONS  · Take  all medicines (inhaled or pills) as directed by your health care provider.  · Avoid over-the-counter medicines or cough syrups that dry up your airway (such as antihistamines) and slow down the elimination of secretions unless instructed otherwise by your health care provider.  · If you are a smoker, the most important thing that you can do is stop smoking. Continuing to smoke will cause further lung damage and breathing trouble. Ask your health care provider for help with quitting smoking. He or she can direct you to community resources or hospitals that provide support.  · Avoid exposure to irritants such as smoke, chemicals, and fumes that aggravate your breathing.  · Use oxygen therapy and pulmonary rehabilitation if directed by your health care provider. If you require home oxygen therapy, ask your health care provider whether you should purchase a pulse oximeter to measure your oxygen level at home.  · Avoid contact with individuals who have a contagious illness.  · Avoid extreme temperature and humidity changes.  · Eat healthy foods. Eating smaller, more frequent meals and resting before meals may help you maintain your strength.  · Stay active, but balance activity with periods of rest. Exercise and physical activity will help you maintain your ability to do things you want to do.  · Preventing infection and hospitalization is very important when you have COPD. Make sure to receive all the vaccines your health care provider recommends, especially the pneumococcal and influenza vaccines. Ask your health care provider whether you need a pneumonia vaccine.  · Learn and use relaxation techniques to manage stress.  · Learn and use controlled breathing techniques as directed by your health care provider. Controlled breathing techniques include:  ¨ Pursed lip breathing. Start by breathing in (inhaling) through your nose for 1 second. Then, purse your lips as if you were going to whistle and breathe out (exhale)  through the pursed lips for 2 seconds.  ¨ Diaphragmatic breathing. Start by putting one hand on your abdomen just above your waist. Inhale slowly through your nose. The hand on your abdomen should move out. Then purse your lips and exhale slowly. You should be able to feel the hand on your abdomen moving in as you exhale.  · Learn and use controlled coughing to clear mucus from your lungs. Controlled coughing is a series of short, progressive coughs. The steps of controlled coughing are:  4. Lean your head slightly forward.  5. Breathe in deeply using diaphragmatic breathing.  6. Try to hold your breath for 3 seconds.  7. Keep your mouth slightly open while coughing twice.  8. Spit any mucus out into a tissue.  9. Rest and repeat the steps once or twice as needed.  SEEK MEDICAL CARE IF:  · You are coughing up more mucus than usual.  · There is a change in the color or thickness of your mucus.  · Your breathing is more labored than usual.  · Your breathing is faster than usual.  SEEK IMMEDIATE MEDICAL CARE IF:  · You have shortness of breath while you are resting.  · You have shortness of breath that prevents you from:  ¨ Being able to talk.  ¨ Performing your usual physical activities.  · You have chest pain lasting longer than 5 minutes.  · Your skin color is more cyanotic than usual.  · You measure low oxygen saturations for longer than 5 minutes with a pulse oximeter.  MAKE SURE YOU:  · Understand these instructions.  · Will watch your condition.  · Will get help right away if you are not doing well or get worse.     This information is not intended to replace advice given to you by your health care provider. Make sure you discuss any questions you have with your health care provider.     Document Released: 09/27/2006 Document Revised: 01/08/2016 Document Reviewed: 08/14/2014  [employer] Interactive Patient Education ©2016 [employer]                Patient Information     Patient Information    Following  emergency treatment: all patient requiring follow-up care must return either to a private physician or a clinic if your condition worsens before you are able to obtain further medical attention, please return to the emergency room.     Billing Information    At [facility], we work to make the billing process streamlined for our patients.  Our Representatives are here to answer any questions you may have regarding your hospital bill.  If you have insurance coverage and have supplied your insurance information to us, we will submit a claim to your insurer on your behalf.  Should you have any questions regarding your bill, we can be reached online or by phone as follows:  Online: You are able pay your bills online or live chat with our representatives about any billing questions you may have. We are here to help Monday - Friday from 8:00am to 7:30pm and 9:00am - 12:00pm on Saturdays.  Please visit [email or web address]  for more information.   Phone:  [phone] or [phone]    Please note that your emergency physician, surgeon, pathologist, radiologist, anesthesiologist, and other specialists are not employed by [facility] and will therefore bill separately for their services.  Please contact them directly for any questions concerning their bills at the numbers below:     Emergency Physician Services:  [phone]  [facility]:  [phone]  [facility]:  [phone]  [facility]:  [phone]    1. Your final bill may vary from the amount quoted upon discharge if all procedures are not complete at that time, or if your doctor has additional procedures of which we are not aware. You will receive an additional bill if you return to the Emergency Department at [facility] for suture removal regardless of the facility of which the sutures were placed.     2. Please arrange for settlement of this account at the emergency  registration.    3. All self-pay accounts are due in full at the time of treatment.  If you are unable to meet this obligation then payment is expected within 4-5 days.     4. If you have had radiology studies (CT, X-ray, Ultrasound, MRI), you have received a preliminary result during your emergency department visit. Please contact the radiology department [phone] to receive a copy of your final result. Please discuss the Final result with your primary physician or with the follow up physician provided.     Crisis Hotline:  [place] Crisis Hotline:  3-490-WGWPUQB or 1-437.556.8031  [place] Crisis Hotline:    1-906.912.6445 or 755-841-6864         ED Discharge Follow Up Questions    1. In order to provide you with very good care, we would like to follow up with a phone call in the next few days.  May we have your permission to contact you?     YES /  NO    2. What is the best phone number to call you? (       )_____-__________    3. What is the best time to call you?      Morning  /  Afternoon  /  Evening                   Patient Signature:  ____________________________________________________________    Date:  ____________________________________________________________      Your appointments     Mar 28, 2017 10:30 AM   PT New Evaluation 30 Minutes with ABEL Louis   [facility] ([address])    [address] [place] [address] [place] [address]   [phone]           Please bring Photo ID, Insurance Cards, list of all Medication and copies of any legal documents (such as Living Will, Power of ) If speaking a language besides English please bring an adult . Please arrive 30 minutes prior for check in and registration.

## 2017-03-26 NOTE — ED AVS SNAPSHOT
3/26/2017          Tracy Calderon  [address]  [facility] [address]    Dear Tracy:    [facility] wants to ensure your discharge home is safe and you or your loved ones have had all your questions answered regarding your care after you leave the hospital.    You may receive a telephone call within two days of your discharge.  This call is to make certain you understand your discharge instructions as well as ensure we provided you with the best care possible during your stay with us.     The call will only last approximately 3-5 minutes and will be done by a nurse.    Once again, we want to ensure your discharge home is safe and that you have a clear understanding of any next steps in your care.  If you have any questions or concerns, please do not hesitate to contact us, we are here for you.  Thank you for choosing [facility] for your healthcare needs.    Sincerely,    Too Major    [facility]

## 2017-03-27 ENCOUNTER — PATIENT OUTREACH (OUTPATIENT)
Dept: HEALTH INFORMATION MANAGEMENT | Facility: OTHER | Age: 37
End: 2017-03-27

## 2017-03-28 ENCOUNTER — APPOINTMENT (OUTPATIENT)
Dept: PHYSICAL THERAPY | Facility: REHABILITATION | Age: 37
End: 2017-03-28
Attending: NURSE PRACTITIONER
Payer: MEDICAID

## 2017-04-05 ENCOUNTER — OFFICE VISIT (OUTPATIENT)
Dept: MEDICAL GROUP | Facility: MEDICAL CENTER | Age: 37
End: 2017-04-05
Attending: NURSE PRACTITIONER
Payer: MEDICAID

## 2017-04-05 VITALS
TEMPERATURE: 97 F | OXYGEN SATURATION: 90 % | SYSTOLIC BLOOD PRESSURE: 108 MMHG | DIASTOLIC BLOOD PRESSURE: 76 MMHG | BODY MASS INDEX: 42.06 KG/M2 | WEIGHT: 284 LBS | RESPIRATION RATE: 20 BRPM | HEART RATE: 92 BPM | HEIGHT: 69 IN

## 2017-04-05 DIAGNOSIS — J44.9 CHRONIC OBSTRUCTIVE PULMONARY DISEASE, UNSPECIFIED COPD TYPE (HCC): ICD-10-CM

## 2017-04-05 DIAGNOSIS — Z72.0 TOBACCO ABUSE: ICD-10-CM

## 2017-04-05 DIAGNOSIS — S05.01XD CORNEAL ABRASION, RIGHT, SUBSEQUENT ENCOUNTER: ICD-10-CM

## 2017-04-05 DIAGNOSIS — H10.31 ACUTE BACTERIAL CONJUNCTIVITIS OF RIGHT EYE: ICD-10-CM

## 2017-04-05 PROCEDURE — 99214 OFFICE O/P EST MOD 30 MIN: CPT | Performed by: NURSE PRACTITIONER

## 2017-04-05 ASSESSMENT — PAIN SCALES - GENERAL: PAINLEVEL: NO PAIN

## 2017-04-05 NOTE — PROGRESS NOTES
"Subjective:     Chief Complaint   Patient presents with   • Follow-Up     ER,had \"scratch\" on eye     Tracy Calderon is a 36 y.o. female here today for ER follow up.    The patient was seen in the ER over the weekend reporting right eye pain. She was diagnosed with right conjunctivitis and a corneal abrasion. She was prescribed tobramycin ophthalmic ointment which she has been using and states her symptoms have resolved. She denies any further pain, redness, or discharge. She states her vision is intact.     She was also diagnosed with an acute exacerbation of COPD but she denies this. She does have known COPD but reports she just had a \"cold\" at the time she was seen. She states her breathing is at baseline. Pt reporting mild SOB with exertion. Pt reports taking inhalers as prescribed. Denies cough, severe SOB, CP, recent illness, fevers/chills. She does report that her nebulizer is broken and she needs an order for a new one.         Current medicines (including changes today)  Current Outpatient Prescriptions   Medication Sig Dispense Refill   • gabapentin (NEURONTIN) 100 MG Cap      • prazosin (MINIPRESS) 5 MG Cap      • zolpidem (AMBIEN) 5 MG Tab      • tiotropium (SPIRIVA HANDIHALER) 18 MCG Cap INHALE ONE DOSE BY MOUTH ONCE DAILY 30 Cap 11   • atorvastatin (LIPITOR) 40 MG Tab TAKE ONE TABLET BY MOUTH IN THE EVENING (INCREASED  DOSE) 30 Tab 3   • methocarbamol (ROBAXIN) 500 MG Tab TAKE ONE TABLET BY MOUTH TWICE DAILY AS NEEDED FOR  MUSCLE  TENSION/PAIN 60 Tab 3   • furosemide (LASIX) 20 MG Tab TAKE ONE TABLET BY MOUTH ONCE DAILY 30 Tab 3   • triamcinolone (NASACORT) 55 MCG/ACT nasal inhaler Spray 2 Sprays in nose every day. 1 Bottle 3   • citalopram (CELEXA) 40 MG Tab      • mirtazapine (REMERON) 15 MG Tab      • hydrOXYzine (ATARAX) 25 MG Tab Take 25 mg by mouth 2 Times a Day.     • ranitidine (ZANTAC) 150 MG Tab TAKE ONE TABLET BY MOUTH TWICE DAILY WITH MEALS 60 Tab 6   • albuterol (PROVENTIL) " "2.5mg/3ml Nebu Soln solution for nebulization 3 mL by Nebulization route every four hours as needed for Shortness of Breath. 75 mL 3   • albuterol 108 (90 BASE) MCG/ACT Aero Soln inhalation aerosol Inhale 2 Puffs by mouth every 6 hours as needed for Shortness of Breath. 8.5 g 3   • nicotine polacrilex (NICORETTE) 4 MG gum Take 4 mg by mouth every 2 hours as needed. 270 Each 3     No current facility-administered medications for this visit.     She  has a past medical history of ETOH abuse; Anxiety; COPD (chronic obstructive pulmonary disease) (CMS-HCC) (2/17/2015); History of substance abuse (2/17/2015); Hyperlipidemia (2/17/2015); Hyperlipidemia (2/17/2015); Cricopharyngeal achalasia (3/12/2015); Heart burn; Indigestion; Breath shortness; Snoring; Asthma; Anesthesia; Psychiatric disorder; Bipolar disorder (CMS-HCC); and Depression. She also has no past medical history of Hypertension or Diabetes.      Current medications, allergies and problems list reviewed and updated in [facility] [employer].      ROS   Review of systems as documented above in history of present illness.         Objective:     Blood pressure 108/76, pulse 92, temperature 36.1 °C (97 °F), resp. rate 20, height 1.753 m (5' 9.02\"), weight 128.822 kg (284 lb), last menstrual period 04/05/2017, SpO2 90 %, not currently breastfeeding. Body mass index is 41.92 kg/(m^2).     Physical Exam:  Alert, oriented in no acute distress.  Eye contact is good, speech goal directed, affect calm  HEENT: conjunctiva non-injected, sclera non-icteric. No discharge noted.  Oral mucous membranes pink and moist with no lesions.  Neck: Supple.  Lungs: Few rhonchi noted with scattered expiratory wheezes. Oxygenation low normal on RA.  CV: regular rate and rhythm.  Ext: no edema  Skin: no rashes or lesions in visible areas.  MSK: Normal gait.     ER notes reviewed.      Assessment and Plan:   The following treatment plan was discussed     1. Acute bacterial conjunctivitis of right eye  " Symptoms improved. Complete course of topical antibiotic ointment x 7 days as directed.     2. Corneal abrasion, right, subsequent encounter  Symptoms improved. Education given.  Again finish ointment Rx     3. Chronic obstructive pulmonary disease, unspecified COPD type (CMS-[facility])  She denies any signs of exacerbation.  Continue on inhaler regimen.  Will send new order to [facility] to replace broken nebulizer.     4. Tobacco abuse  Cessation strongly encouraged.         Followup: Return if symptoms worsen or fail to improve.          Please note that this dictation was created using voice recognition software. Every reasonable attempt has been made to correct obvious errors, however there may be errors of grammar and possibly content that were not discovered before finalizing the note.

## 2017-04-05 NOTE — MR AVS SNAPSHOT
"        Tracy Haynesin   2017 8:10 AM   Office Visit   MRN: [id number]    [facility]   Dept Phone:  [phone]    Description:  Female : 1980   Provider:  ZORA Simpson           Reason for Visit     Follow-Up ER,had \"scratch\" on eye      Allergies as of 2017     Allergen Noted Reactions    Penicillins 2007   Hives    Vicodin [Hydrocodone-Acetaminophen] 2015       'WIRES ME, DO NOT LIKE HOW IT MAKES ME FEEL',HYPERACTIVITY      You were diagnosed with     Acute bacterial conjunctivitis of right eye   [7728908]       Corneal abrasion, right, subsequent encounter   [477852]       Chronic obstructive pulmonary disease, unspecified COPD type (CMS-HCC)   [8629736]       Tobacco abuse   [779424]         Vital Signs     Blood Pressure Pulse Temperature Respirations Height Weight    108/76 mmHg 92 36.1 °C (97 °F) 20 1.753 m (5' 9.02\") 128.822 kg (284 lb)    Body Mass Index Oxygen Saturation Last Menstrual Period Breastfeeding? Smoking Status       41.92 kg/m2 90% 2017 No Current Every Day Smoker       Basic Information     Date Of Birth Sex Race Ethnicity Preferred Language    1980 Female White Non- English      Problem List              ICD-10-CM Priority Class Noted - Resolved    GERD (gastroesophageal reflux disease) K21.9   2013 - Present    Tobacco abuse Z72.0   2013 - Present    Chest wall pain R07.89   2013 - Present    COPD (chronic obstructive pulmonary disease) (CMS-HCC) J44.9   2015 - Present    Anxiety F41.9   2015 - Present    History of substance abuse Z87.898   2015 - Present    Hyperlipidemia E78.5   2015 - Present    S/P nasal septoplasty Z98.890   2015 - Present    Bilateral edema of lower extremity R60.0   8/10/2015 - Present    Nocturnal sleep-related eating disorder G47.8   2015 - Present    Morbid obesity with BMI of 40.0-44.9, adult (CMS-HCC) E66.01, Z68.41   3/7/2017 - Present "      Health Maintenance        Date Due Completion Dates    IMM DTaP/Tdap/Td Vaccine (1 - Tdap) 8/24/2021 (Originally 8/25/2011) 8/24/2011    PAP SMEAR 3/31/2018 3/31/2015, 3/31/2015            Current Immunizations     Influenza TIV (IM) 10/29/2013    Influenza Vaccine Quad Inj (Pf) 1/28/2016  1:09 PM    Influenza Vaccine Quad Inj (Preserved) 10/20/2016, 10/28/2014    Pneumococcal polysaccharide vaccine (PPSV-23) 11/23/2016    TD Vaccine 8/24/2011 10:36 AM      Below and/or attached are the medications your provider expects you to take. Review all of your home medications and newly ordered medications with your provider and/or pharmacist. Follow medication instructions as directed by your provider and/or pharmacist. Please keep your medication list with you and share with your provider. Update the information when medications are discontinued, doses are changed, or new medications (including over-the-counter products) are added; and carry medication information at all times in the event of emergency situations     Allergies:  PENICILLINS - Hives     VICODIN - (reactions not documented)               Medications  Valid as of: April 05, 2017 -  9:00 AM    Generic Name Brand Name Tablet Size Instructions for use    Albuterol Sulfate (Nebu Soln) PROVENTIL 2.5mg/3ml 3 mL by Nebulization route every four hours as needed for Shortness of Breath.        Albuterol Sulfate (Aero Soln) albuterol 108 (90 BASE) MCG/ACT Inhale 2 Puffs by mouth every 6 hours as needed for Shortness of Breath.        Atorvastatin Calcium (Tab) LIPITOR 40 MG TAKE ONE TABLET BY MOUTH IN THE EVENING (INCREASED  DOSE)        Citalopram Hydrobromide (Tab) CELEXA 40 MG         Furosemide (Tab) LASIX 20 MG TAKE ONE TABLET BY MOUTH ONCE DAILY        Gabapentin (Cap) NEURONTIN 100 MG         HydrOXYzine HCl (Tab) ATARAX 25 MG Take 25 mg by mouth 2 Times a Day.        Methocarbamol (Tab) ROBAXIN 500 MG TAKE ONE TABLET BY MOUTH TWICE DAILY AS NEEDED FOR   MUSCLE  TENSION/PAIN        Mirtazapine (Tab) REMERON 15 MG         Nicotine Polacrilex (Gum) NICORETTE 4 MG Take 4 mg by mouth every 2 hours as needed.        Prazosin HCl (Cap) MINIPRESS 5 MG         RaNITidine HCl (Tab) ZANTAC 150 MG TAKE ONE TABLET BY MOUTH TWICE DAILY WITH MEALS        Tiotropium Bromide Monohydrate (Cap) SPIRIVA 18 MCG INHALE ONE DOSE BY MOUTH ONCE DAILY        Triamcinolone Acetonide (Aerosol) NASACORT 55 MCG/ACT Protivin 2 Sprays in nose every day.        Zolpidem Tartrate (Tab) AMBIEN 5 MG         .                 Medicines prescribed today were sent to:     [facility] [address] [facility] ([place] [place] [address]    [facility] [place] [address]    Phone: [phone] Fax: [phone]    Open 24 Hours?: No      Medication refill instructions:       If your prescription bottle indicates you have medication refills left, it is not necessary to call your provider’s office. Please contact your pharmacy and they will refill your medication.    If your prescription bottle indicates you do not have any refills left, you may request refills at any time through one of the following ways: The online [employer] system (except Urgent Care), by calling your provider’s office, or by asking your pharmacy to contact your provider’s office with a refill request. Medication refills are processed only during regular business hours and may not be available until the next business day. Your provider may request additional information or to have a follow-up visit with you prior to refilling your medication.   *Please Note: Medication refills are assigned a new Rx number when refilled electronically. Your pharmacy may indicate that no refills were authorized even though a new prescription for the same medication is available at the pharmacy. Please request the medicine by name with the pharmacy before contacting your provider for a refill.        Instructions    Please take all medications as instructed.  If  you have any adverse reactions, please report them immediately.  Please follow all instructions that were discussed in the visit.  Please complete any ordered blood work prior to next visit.  Please follow up with any referrals made.            MyChart Status: Patient Declined        Quit Tobacco Information     Do you want to quit using tobacco?    Quitting tobacco decreases risks of cancer, heart and lung disease, increases life expectancy, improves sense of taste and smell, and increases spending money, among other benefits.    If you are thinking about quitting, we can help.  • [facility] Quit Tobacco Program: [phone]  o Program occurs weekly for four weeks and includes pharmacist consultation on products to support quitting smoking or chewing tobacco. A provider referral is needed for pharmacist consultation.  • Tobacco Users Help Hotline: 1-246-QUIT-NOW (214-4727) or https://[place].quitlogix.org/  o Free, confidential telephone and online coaching for [place] residents. Sessions are designed on a schedule that is convenient for you. Eligible clients receive free nicotine replacement therapy.  • Nationally: www.smokefree.gov  o Information and professional assistance to support both immediate and long-term needs as you become, and remain, a non-smoker. Smokefree.gov allows you to choose the help that best fits your needs.

## 2017-05-17 ENCOUNTER — OFFICE VISIT (OUTPATIENT)
Dept: MEDICAL GROUP | Facility: MEDICAL CENTER | Age: 37
End: 2017-05-17
Attending: NURSE PRACTITIONER
Payer: MEDICAID

## 2017-05-17 VITALS
HEART RATE: 100 BPM | WEIGHT: 292 LBS | TEMPERATURE: 97.3 F | HEIGHT: 69 IN | SYSTOLIC BLOOD PRESSURE: 115 MMHG | DIASTOLIC BLOOD PRESSURE: 68 MMHG | OXYGEN SATURATION: 92 % | RESPIRATION RATE: 20 BRPM | BODY MASS INDEX: 43.25 KG/M2

## 2017-05-17 DIAGNOSIS — K05.10 GINGIVITIS: ICD-10-CM

## 2017-05-17 DIAGNOSIS — K08.9 DENTAL DISORDER: ICD-10-CM

## 2017-05-17 PROCEDURE — 99213 OFFICE O/P EST LOW 20 MIN: CPT | Performed by: NURSE PRACTITIONER

## 2017-05-17 PROCEDURE — 99214 OFFICE O/P EST MOD 30 MIN: CPT | Performed by: NURSE PRACTITIONER

## 2017-05-17 RX ORDER — OXYCODONE HYDROCHLORIDE AND ACETAMINOPHEN 5; 325 MG/1; MG/1
1 TABLET ORAL EVERY 8 HOURS PRN
Qty: 12 TAB | Refills: 0 | Status: SHIPPED | OUTPATIENT
Start: 2017-05-17 | End: 2017-06-13

## 2017-05-17 RX ORDER — CLONAZEPAM 0.5 MG/1
0.5 TABLET ORAL 3 TIMES DAILY
COMMUNITY
Start: 2017-05-15 | End: 2021-03-10

## 2017-05-17 RX ORDER — CLINDAMYCIN HYDROCHLORIDE 300 MG/1
300 CAPSULE ORAL 3 TIMES DAILY
Qty: 21 CAP | Refills: 0 | Status: SHIPPED | OUTPATIENT
Start: 2017-05-17 | End: 2017-06-13

## 2017-05-17 RX ORDER — IBUPROFEN 800 MG/1
400 TABLET ORAL EVERY 8 HOURS PRN
Qty: 30 TAB | Refills: 3 | Status: SHIPPED | OUTPATIENT
Start: 2017-05-17 | End: 2017-11-30

## 2017-05-17 ASSESSMENT — PAIN SCALES - GENERAL: PAINLEVEL: 8=MODERATE-SEVERE PAIN

## 2017-05-17 NOTE — ASSESSMENT & PLAN NOTE
"Pt reports a few weeks ago when eating Celery has had some discomfort and loose tooth to right upper incisor.  States called [employer] and found dentist on \"[address] and Pt believes it is [facility], but unable to get emergency appt and leaving town.  Denies any problems swallowing. Does report slight lymph node swelling/tenderness to left lower jaw area but denies any tenderness or pain to throat or left side of mouth.  She states she is going out of town for 4 days and will not be able to see dentist until after she returns.  I have given her a Dental Van Packet in case she is not able to see [facility] as well and instructed her if condition worsens to see dentist or ER during trip.  "

## 2017-05-17 NOTE — PROGRESS NOTES
"    Chief Complaint: Tooth loose and dental pain    HPI:  Tracy presents to the clinic as same day appointment for dental concern.  Her PCP, Devonte FRASER is not available to see Tracy today.    Her PMH includes    Anxiety  Bipolar Disorder/Depression  COPD  Chest wall pain  GERD  Hyperlipidemia  Morbid Obesity  Sleep r/t eating disorder  Septoplasty  Tobacco Abuse  Bilat edema to lower legs  Substance abuse hx (alcohol, Marijuana)    [place]  Report  5/15/17 Clonazepam 0.5 mg # 60 by Dominguez (Psychiatry)  5/7/17 Ambien 5 mg # 30 Dominguez  More similar entries in report....    Allergic to Penicillin and Vicodin     Dental disorder  Pt reports a few weeks ago when eating Celery has had some discomfort and loose tooth to right upper incisor.  States called [employer] and found dentist on \"[address] and Pt believes it is [facility], but unable to get emergency appt and leaving town.  Denies any problems swallowing. Does report slight lymph node swelling/tenderness to left lower jaw area but denies any tenderness or pain to throat or left side of mouth.  She states she is going out of town for 4 days and will not be able to see dentist until after she returns.  I have given her a Dental Van Packet in case she is not able to see [facility] as well and instructed her if condition worsens to see dentist or ER during trip.      Patient Active Problem List    Diagnosis Date Noted   • Dental disorder 05/17/2017   • Morbid obesity with BMI of 40.0-44.9, adult (CMS-HCC) 03/07/2017   • Nocturnal sleep-related eating disorder 08/18/2015   • Bilateral edema of lower extremity 08/10/2015   • S/P nasal septoplasty 06/16/2015   • COPD (chronic obstructive pulmonary disease) (CMS-HCC) 02/17/2015   • Anxiety 02/17/2015   • History of substance abuse 02/17/2015   • Hyperlipidemia 02/17/2015   • GERD (gastroesophageal reflux disease) 07/18/2013   • Tobacco abuse 07/18/2013   • Chest wall pain 07/18/2013 "       Allergies:Penicillins and Vicodin    Current Outpatient Prescriptions   Medication Sig Dispense Refill   • clindamycin (CLEOCIN) 300 MG Cap Take 1 Cap by mouth 3 times a day. 21 Cap 0   • oxycodone-acetaminophen (PERCOCET) 5-325 MG Tab Take 1 Tab by mouth every 8 hours as needed for Moderate Pain or Severe Pain. 12 Tab 0   • ibuprofen (MOTRIN) 800 MG Tab Take 0.5 Tabs by mouth every 8 hours as needed. 30 Tab 3   • clonazepam (KLONOPIN) 0.5 MG Tab      • ranitidine (ZANTAC) 150 MG Tab TAKE ONE TABLET BY MOUTH TWICE DAILY WITH MEALS 60 Tab 3   • gabapentin (NEURONTIN) 100 MG Cap      • prazosin (MINIPRESS) 5 MG Cap      • zolpidem (AMBIEN) 5 MG Tab      • tiotropium (SPIRIVA HANDIHALER) 18 MCG Cap INHALE ONE DOSE BY MOUTH ONCE DAILY 30 Cap 11   • atorvastatin (LIPITOR) 40 MG Tab TAKE ONE TABLET BY MOUTH IN THE EVENING (INCREASED  DOSE) 30 Tab 3   • methocarbamol (ROBAXIN) 500 MG Tab TAKE ONE TABLET BY MOUTH TWICE DAILY AS NEEDED FOR  MUSCLE  TENSION/PAIN 60 Tab 3   • furosemide (LASIX) 20 MG Tab TAKE ONE TABLET BY MOUTH ONCE DAILY 30 Tab 3   • triamcinolone (NASACORT) 55 MCG/ACT nasal inhaler Spray 2 Sprays in nose every day. 1 Bottle 3   • citalopram (CELEXA) 40 MG Tab      • mirtazapine (REMERON) 15 MG Tab      • hydrOXYzine (ATARAX) 25 MG Tab Take 25 mg by mouth 2 Times a Day.     • albuterol (PROVENTIL) 2.5mg/3ml Nebu Soln solution for nebulization 3 mL by Nebulization route every four hours as needed for Shortness of Breath. 75 mL 3   • albuterol 108 (90 BASE) MCG/ACT Aero Soln inhalation aerosol Inhale 2 Puffs by mouth every 6 hours as needed for Shortness of Breath. 8.5 g 3   • nicotine polacrilex (NICORETTE) 4 MG gum Take 4 mg by mouth every 2 hours as needed. 270 Each 3     No current facility-administered medications for this visit.       Social History   Substance Use Topics   • Smoking status: Current Every Day Smoker -- 0.50 packs/day for 15 years     Types: Cigarettes   • Smokeless tobacco: Never  "Used   • Alcohol Use: No      Comment: h/o heavy drinking x 16 years. quit drinking in May 2013       Family History   Problem Relation Age of Onset   • Lung Disease Mother    • Stroke Mother    • Cancer Neg Hx    • Diabetes Neg Hx    • Heart Disease Maternal Grandfather        ROS:  Review of Systems   See HPI Above        Exam:  Blood pressure 115/68, pulse 100, temperature 36.3 °C (97.3 °F), resp. rate 20, height 1.753 m (5' 9.02\"), weight 132.45 kg (292 lb), SpO2 92 %.  General:  Well nourished, well developed female in moderated distress.  HENT:Head is grossly normal. PERRL. Oral cavity clear of lesions. Right front incisor loose and with caries. Mild tenderness to gum line  Surrounding that tooth. No other abnormalities seen. Swallows on command. Voice is non hoarse.  Neck: Supple. Good ROM.Trachea is midline. Small left lower jaw line palpable lymph node, mildly tender to touch,  No neck lymph nodes swollen.  Pulmonary: Clear to ausculation .  Normal effort.  Cardiovascular: Regular rate and rhythm.  Abdomen-Abdomen is soft, No tenderness.  Upper extremities-  Good ROM  Lower extremities- neg for edema, redness, tenderness.  Neuro- A & O x 4. Speech clear and appropriate.     Current medications, allergies, and problem list reviewed with patient and updated in  [facility] today.    Assessment/Plan:  1. Dental disorder  clindamycin (CLEOCIN) 300 MG Cap    oxycodone-acetaminophen (PERCOCET) 5-325 MG Tab-Limited amt #12    ibuprofen (MOTRIN) 800 MG Tab to take 1/2 tab BID prn (with food)   2. Gingivitis  clindamycin (CLEOCIN) 300 MG Cap  Pt to see dentist as soon as able. Either [facility] as planned per Patient or Dental van (info given).  If worse pain, swelling or any difficulty with opening or closing mouth, fever/chills Pt instructed to go to ER.   Follow up as needed. Call or return if questions, concerns, or worsening condition.  "

## 2017-05-17 NOTE — MR AVS SNAPSHOT
"        Tracymary anne Houserchao   2017 8:30 AM   Office Visit   MRN: [id number]    [facility]   Dept Phone:  [phone]    Description:  Female : 1980   Provider:  ZORA Stearns           Reason for Visit     Dental Pain           Allergies as of 2017     Allergen Noted Reactions    Penicillins 2007   Hives    Vicodin [Hydrocodone-Acetaminophen] 2015       'WIRES ME, DO NOT LIKE HOW IT MAKES ME FEEL',HYPERACTIVITY      You were diagnosed with     Dental disorder   [786359]       Gingivitis   [274770]         Vital Signs     Blood Pressure Pulse Temperature Respirations Height Weight    115/68 mmHg 100 36.3 °C (97.3 °F) 20 1.753 m (5' 9.02\") 132.45 kg (292 lb)    Body Mass Index Oxygen Saturation Smoking Status             43.10 kg/m2 92% Current Every Day Smoker         Basic Information     Date Of Birth Sex Race Ethnicity Preferred Language    1980 Female White Non- English      Problem List              ICD-10-CM Priority Class Noted - Resolved    GERD (gastroesophageal reflux disease) K21.9   2013 - Present    Tobacco abuse Z72.0   2013 - Present    Chest wall pain R07.89   2013 - Present    COPD (chronic obstructive pulmonary disease) (CMS-HCC) J44.9   2015 - Present    Anxiety F41.9   2015 - Present    History of substance abuse Z87.898   2015 - Present    Hyperlipidemia E78.5   2015 - Present    S/P nasal septoplasty Z98.890   2015 - Present    Bilateral edema of lower extremity R60.0   8/10/2015 - Present    Nocturnal sleep-related eating disorder G47.8   2015 - Present    Morbid obesity with BMI of 40.0-44.9, adult (CMS-HCC) E66.01, Z68.41   3/7/2017 - Present    Dental disorder K08.9   2017 - Present      Health Maintenance        Date Due Completion Dates    IMM DTaP/Tdap/Td Vaccine (1 - Tdap) 2021 (Originally 2011) 2011    PAP SMEAR 3/31/2018 3/31/2015, 3/31/2015         "   Current Immunizations     Influenza TIV (IM) 10/29/2013    Influenza Vaccine Quad Inj (Pf) 1/28/2016  1:09 PM    Influenza Vaccine Quad Inj (Preserved) 10/20/2016, 10/28/2014    Pneumococcal polysaccharide vaccine (PPSV-23) 11/23/2016    TD Vaccine 8/24/2011 10:36 AM      Below and/or attached are the medications your provider expects you to take. Review all of your home medications and newly ordered medications with your provider and/or pharmacist. Follow medication instructions as directed by your provider and/or pharmacist. Please keep your medication list with you and share with your provider. Update the information when medications are discontinued, doses are changed, or new medications (including over-the-counter products) are added; and carry medication information at all times in the event of emergency situations     Allergies:  PENICILLINS - Hives     VICODIN - (reactions not documented)               Medications  Valid as of: May 17, 2017 -  8:36 AM    Generic Name Brand Name Tablet Size Instructions for use    Albuterol Sulfate (Nebu Soln) PROVENTIL 2.5mg/3ml 3 mL by Nebulization route every four hours as needed for Shortness of Breath.        Albuterol Sulfate (Aero Soln) albuterol 108 (90 BASE) MCG/ACT Inhale 2 Puffs by mouth every 6 hours as needed for Shortness of Breath.        Atorvastatin Calcium (Tab) LIPITOR 40 MG TAKE ONE TABLET BY MOUTH IN THE EVENING (INCREASED  DOSE)        Citalopram Hydrobromide (Tab) CELEXA 40 MG         Clindamycin HCl (Cap) CLEOCIN 300 MG Take 1 Cap by mouth 3 times a day.        ClonazePAM (Tab) KLONOPIN 0.5 MG         Furosemide (Tab) LASIX 20 MG TAKE ONE TABLET BY MOUTH ONCE DAILY        Gabapentin (Cap) NEURONTIN 100 MG         HydrOXYzine HCl (Tab) ATARAX 25 MG Take 25 mg by mouth 2 Times a Day.        Ibuprofen (Tab) MOTRIN 800 MG Take 0.5 Tabs by mouth every 8 hours as needed.        Methocarbamol (Tab) ROBAXIN 500 MG TAKE ONE TABLET BY MOUTH TWICE DAILY AS  NEEDED FOR  MUSCLE  TENSION/PAIN        Mirtazapine (Tab) REMERON 15 MG         Nicotine Polacrilex (Gum) NICORETTE 4 MG Take 4 mg by mouth every 2 hours as needed.        Oxycodone-Acetaminophen (Tab) PERCOCET 5-325 MG Take 1 Tab by mouth every 8 hours as needed for Moderate Pain or Severe Pain.        Prazosin HCl (Cap) MINIPRESS 5 MG         RaNITidine HCl (Tab) ZANTAC 150 MG TAKE ONE TABLET BY MOUTH TWICE DAILY WITH MEALS        Tiotropium Bromide Monohydrate (Cap) SPIRIVA 18 MCG INHALE ONE DOSE BY MOUTH ONCE DAILY        Triamcinolone Acetonide (Aerosol) NASACORT 55 MCG/ACT [place] 2 Sprays in nose every day.        Zolpidem Tartrate (Tab) AMBIEN 5 MG         .                 Medicines prescribed today were sent to:     [facility] [address] [place] [address] (S), [place] [address]    [facility] (S) [place] [address]    Phone: [phone] Fax: [phone]    Open 24 Hours?: No      Medication refill instructions:       If your prescription bottle indicates you have medication refills left, it is not necessary to call your provider’s office. Please contact your pharmacy and they will refill your medication.    If your prescription bottle indicates you do not have any refills left, you may request refills at any time through one of the following ways: The online [employer] system (except Urgent Care), by calling your provider’s office, or by asking your pharmacy to contact your provider’s office with a refill request. Medication refills are processed only during regular business hours and may not be available until the next business day. Your provider may request additional information or to have a follow-up visit with you prior to refilling your medication.   *Please Note: Medication refills are assigned a new Rx number when refilled electronically. Your pharmacy may indicate that no refills were authorized even though a new prescription for the same medication is available at the pharmacy. Please request the  medicine by name with the pharmacy before contacting your provider for a refill.           MyChart Status: Patient Declined        Quit Tobacco Information     Do you want to quit using tobacco?    Quitting tobacco decreases risks of cancer, heart and lung disease, increases life expectancy, improves sense of taste and smell, and increases spending money, among other benefits.    If you are thinking about quitting, we can help.  • [facility] Quit Tobacco Program: 131.341.8493  o Program occurs weekly for four weeks and includes pharmacist consultation on products to support quitting smoking or chewing tobacco. A provider referral is needed for pharmacist consultation.  • Tobacco Users Help Hotline: 7-800QUIT-NOW (484-7031) or https://[place].quitlogix.org/  o Free, confidential telephone and online coaching for [place] residents. Sessions are designed on a schedule that is convenient for you. Eligible clients receive free nicotine replacement therapy.  • Nationally: www.smokefree.gov  o Information and professional assistance to support both immediate and long-term needs as you become, and remain, a non-smoker. Smokefree.gov allows you to choose the help that best fits your needs.

## 2017-05-31 ENCOUNTER — HOSPITAL ENCOUNTER (OUTPATIENT)
Dept: LAB | Facility: MEDICAL CENTER | Age: 37
End: 2017-05-31
Attending: ANESTHESIOLOGY
Payer: MEDICAID

## 2017-05-31 LAB
ANION GAP SERPL CALC-SCNC: 5 MMOL/L (ref 0–11.9)
BUN SERPL-MCNC: 24 MG/DL (ref 8–22)
CALCIUM SERPL-MCNC: 9.2 MG/DL (ref 8.5–10.5)
CHLORIDE SERPL-SCNC: 104 MMOL/L (ref 96–112)
CO2 SERPL-SCNC: 30 MMOL/L (ref 20–33)
CREAT SERPL-MCNC: 0.82 MG/DL (ref 0.5–1.4)
GFR SERPL CREATININE-BSD FRML MDRD: >60 ML/MIN/1.73 M 2
GLUCOSE SERPL-MCNC: 81 MG/DL (ref 65–99)
POTASSIUM SERPL-SCNC: 4.5 MMOL/L (ref 3.6–5.5)
SODIUM SERPL-SCNC: 139 MMOL/L (ref 135–145)

## 2017-05-31 PROCEDURE — 36415 COLL VENOUS BLD VENIPUNCTURE: CPT

## 2017-05-31 PROCEDURE — 80048 BASIC METABOLIC PNL TOTAL CA: CPT

## 2017-06-13 ENCOUNTER — OFFICE VISIT (OUTPATIENT)
Dept: MEDICAL GROUP | Facility: MEDICAL CENTER | Age: 37
End: 2017-06-13
Attending: NURSE PRACTITIONER
Payer: MEDICAID

## 2017-06-13 VITALS
BODY MASS INDEX: 43.4 KG/M2 | WEIGHT: 293 LBS | RESPIRATION RATE: 20 BRPM | DIASTOLIC BLOOD PRESSURE: 72 MMHG | OXYGEN SATURATION: 88 % | HEART RATE: 92 BPM | HEIGHT: 69 IN | TEMPERATURE: 97.4 F | SYSTOLIC BLOOD PRESSURE: 100 MMHG

## 2017-06-13 DIAGNOSIS — R09.02 HYPOXIA: ICD-10-CM

## 2017-06-13 DIAGNOSIS — M79.89 SWELLING OF LOWER EXTREMITY: ICD-10-CM

## 2017-06-13 DIAGNOSIS — Z72.0 TOBACCO ABUSE: ICD-10-CM

## 2017-06-13 DIAGNOSIS — M54.9 MID BACK PAIN: ICD-10-CM

## 2017-06-13 DIAGNOSIS — M54.50 ACUTE MIDLINE LOW BACK PAIN WITHOUT SCIATICA: ICD-10-CM

## 2017-06-13 DIAGNOSIS — J44.9 CHRONIC OBSTRUCTIVE PULMONARY DISEASE, UNSPECIFIED COPD TYPE (HCC): ICD-10-CM

## 2017-06-13 PROCEDURE — 99214 OFFICE O/P EST MOD 30 MIN: CPT | Performed by: NURSE PRACTITIONER

## 2017-06-13 ASSESSMENT — PAIN SCALES - GENERAL: PAINLEVEL: NO PAIN

## 2017-06-13 NOTE — MR AVS SNAPSHOT
"        Tracy Haynesin   2017 7:50 AM   Office Visit   MRN: [id number]    [facility]   Dept Phone:  [phone]    Description:  Female : 1980   Provider:  ZORA Simpson           Reason for Visit     Leg Swelling L, foot      Allergies as of 2017     Allergen Noted Reactions    Penicillins 2007   Hives    Vicodin [Hydrocodone-Acetaminophen] 2015       'WIRES ME, DO NOT LIKE HOW IT MAKES ME FEEL',HYPERACTIVITY      You were diagnosed with     Swelling of lower extremity   [0617314]       Chronic obstructive pulmonary disease, unspecified COPD type (CMS-HCC)   [3738470]       Hypoxia   [713771]       Tobacco abuse   [362724]         Vital Signs     Blood Pressure Pulse Temperature Respirations Height Weight    100/72 mmHg 92 36.3 °C (97.4 °F) 20 1.753 m (5' 9.02\") 132.904 kg (293 lb)    Body Mass Index Oxygen Saturation Last Menstrual Period Breastfeeding? Smoking Status       43.25 kg/m2 88% 2017 No Current Every Day Smoker       Basic Information     Date Of Birth Sex Race Ethnicity Preferred Language    1980 Female White Non- English      Problem List              ICD-10-CM Priority Class Noted - Resolved    GERD (gastroesophageal reflux disease) K21.9   2013 - Present    Tobacco abuse Z72.0   2013 - Present    Chest wall pain R07.89   2013 - Present    COPD (chronic obstructive pulmonary disease) (CMS-HCC) J44.9   2015 - Present    Anxiety F41.9   2015 - Present    History of substance abuse Z87.898   2015 - Present    Hyperlipidemia E78.5   2015 - Present    S/P nasal septoplasty Z98.890   2015 - Present    Bilateral edema of lower extremity R60.0   8/10/2015 - Present    Nocturnal sleep-related eating disorder G47.8   2015 - Present    Morbid obesity with BMI of 40.0-44.9, adult (CMS-HCC) E66.01, Z68.41   3/7/2017 - Present    Dental disorder K08.9   2017 - Present      Health " Maintenance        Date Due Completion Dates    IMM DTaP/Tdap/Td Vaccine (1 - Tdap) 8/24/2021 (Originally 8/25/2011) 8/24/2011    PAP SMEAR 3/31/2018 3/31/2015, 3/31/2015            Current Immunizations     Influenza TIV (IM) 10/29/2013    Influenza Vaccine Quad Inj (Pf) 1/28/2016  1:09 PM    Influenza Vaccine Quad Inj (Preserved) 10/20/2016, 10/28/2014    Pneumococcal polysaccharide vaccine (PPSV-23) 11/23/2016    TD Vaccine 8/24/2011 10:36 AM      Below and/or attached are the medications your provider expects you to take. Review all of your home medications and newly ordered medications with your provider and/or pharmacist. Follow medication instructions as directed by your provider and/or pharmacist. Please keep your medication list with you and share with your provider. Update the information when medications are discontinued, doses are changed, or new medications (including over-the-counter products) are added; and carry medication information at all times in the event of emergency situations     Allergies:  PENICILLINS - Hives     VICODIN - (reactions not documented)               Medications  Valid as of: June 13, 2017 -  8:13 AM    Generic Name Brand Name Tablet Size Instructions for use    Albuterol Sulfate (Nebu Soln) PROVENTIL 2.5mg/3ml 3 mL by Nebulization route every four hours as needed for Shortness of Breath.        Albuterol Sulfate (Aero Soln) albuterol 108 (90 BASE) MCG/ACT Inhale 2 Puffs by mouth every 6 hours as needed for Shortness of Breath.        Atorvastatin Calcium (Tab) LIPITOR 40 MG TAKE ONE TABLET BY MOUTH IN THE EVENING (INCREASED  DOSE)        Citalopram Hydrobromide (Tab) CELEXA 40 MG         ClonazePAM (Tab) KLONOPIN 0.5 MG         Furosemide (Tab) LASIX 20 MG TAKE ONE TABLET BY MOUTH ONCE DAILY        Gabapentin (Cap) NEURONTIN 100 MG         HydrOXYzine HCl (Tab) ATARAX 25 MG Take 25 mg by mouth 2 Times a Day.        Ibuprofen (Tab) MOTRIN 800 MG Take 0.5 Tabs by mouth every 8  hours as needed.        Methocarbamol (Tab) ROBAXIN 500 MG TAKE ONE TABLET BY MOUTH TWICE DAILY AS NEEDED FOR  MUSCLE  TENSION/PAIN        Mirtazapine (Tab) REMERON 15 MG         Nicotine Polacrilex (Gum) NICORETTE 4 MG Take 4 mg by mouth every 2 hours as needed.        NON SPECIFIED   Bilateral knee high compression stockings for leg swelling.        Prazosin HCl (Cap) MINIPRESS 5 MG         RaNITidine HCl (Tab) ZANTAC 150 MG TAKE ONE TABLET BY MOUTH TWICE DAILY WITH MEALS        Tiotropium Bromide Monohydrate (Aero Soln) Tiotropium Bromide Monohydrate 2.5 MCG/ACT Inhale 2 Puffs by mouth every day.        Triamcinolone Acetonide (Aerosol) NASACORT 55 MCG/ACT [place] 2 Sprays in nose every day.        Zolpidem Tartrate (Tab) AMBIEN 5 MG         .                 Medicines prescribed today were sent to:     [facility] [address] [place] [address] (S), [place] - [address] [place] [id number] [facility] ([place] [place] [address]    Phone: [phone] Fax: [phone]    Open 24 Hours?: No      Medication refill instructions:       If your prescription bottle indicates you have medication refills left, it is not necessary to call your provider’s office. Please contact your pharmacy and they will refill your medication.    If your prescription bottle indicates you do not have any refills left, you may request refills at any time through one of the following ways: The online [employer] system (except Urgent Care), by calling your provider’s office, or by asking your pharmacy to contact your provider’s office with a refill request. Medication refills are processed only during regular business hours and may not be available until the next business day. Your provider may request additional information or to have a follow-up visit with you prior to refilling your medication.   *Please Note: Medication refills are assigned a new Rx number when refilled electronically. Your pharmacy may indicate that no refills were authorized even though a new  prescription for the same medication is available at the pharmacy. Please request the medicine by name with the pharmacy before contacting your provider for a refill.        Referral     A referral request has been sent to our patient care coordination department. Please allow 3-5 business days for us to process this request and contact you either by phone or mail. If you do not hear from us by the 5th business day, please call us at [phone].           MyChart Status: Patient Declined        Quit Tobacco Information     Do you want to quit using tobacco?    Quitting tobacco decreases risks of cancer, heart and lung disease, increases life expectancy, improves sense of taste and smell, and increases spending money, among other benefits.    If you are thinking about quitting, we can help.  • [facility] Quit Tobacco Program: [phone]  o Program occurs weekly for four weeks and includes pharmacist consultation on products to support quitting smoking or chewing tobacco. A provider referral is needed for pharmacist consultation.  • Tobacco Users Help Hotline: 5-800-QUIT-NOW (978-7192) or https://[place].quitlogix.org/  o Free, confidential telephone and online coaching for [place] residents. Sessions are designed on a schedule that is convenient for you. Eligible clients receive free nicotine replacement therapy.  • Nationally: www.smokefree.gov  o Information and professional assistance to support both immediate and long-term needs as you become, and remain, a non-smoker. Smokefree.gov allows you to choose the help that best fits your needs.

## 2017-06-13 NOTE — PROGRESS NOTES
"Subjective:     Chief Complaint   Patient presents with   • Leg Swelling     L, foot     Tracy Ember Calderon is a 36 y.o. female here today for left foot pain.        She presents to the clinic today reporting that for 1.5 months she had pain and mild swelling in her left lateral foot that extended up her leg. She reports that this came on without injury. She states that last week she underwent and upper endoscopy with dilatation of her esophagus and was put in air powered compression stockings for procedure. She states since this, the pain has completely resolved and she has not had any further swelling. She does take furosmeide daily which was started several years ago for persistent LE swelling. Overall, she states the Lasix has helped. She denies any history of DVT. She is a smoker.    The patient has known COPD and it is noted that her oxygen sats today are 88%. Overall, she states that her breathing is worsening but she denies any acute symptoms of illness today including cough, fevers, or increasing SOB. +tobacco abuse-states she is smoking 1/2-1PPD. PFT from 2013 showed mild COPD, she has been on Spiriva since this time. Pt reporting mild SOB with exertion. She also uses albuterol and a nebulizer prn. She has never been tested for sleep apnea but states she does snore. She often feels tired during the day. Her mother also has oxygen dependent COPD.    She is also requesting a new referral be placed for PT. She was referred back in March due to acute low back pain and a \"burning\" pain in her midback. She states she was unable to go due to having several issues come up but would like to be referred again as her pain symptoms persist.        Current medicines (including changes today)  Current Outpatient Prescriptions   Medication Sig Dispense Refill   • NON SPECIFIED Bilateral knee high compression stockings for leg swelling. 2 Each 0   • Tiotropium Bromide Monohydrate 2.5 MCG/ACT Aero Soln Inhale 2 Puffs " by mouth every day. 1 Inhaler 6   • clonazepam (KLONOPIN) 0.5 MG Tab      • ibuprofen (MOTRIN) 800 MG Tab Take 0.5 Tabs by mouth every 8 hours as needed. 30 Tab 3   • ranitidine (ZANTAC) 150 MG Tab TAKE ONE TABLET BY MOUTH TWICE DAILY WITH MEALS 60 Tab 3   • gabapentin (NEURONTIN) 100 MG Cap      • prazosin (MINIPRESS) 5 MG Cap      • zolpidem (AMBIEN) 5 MG Tab      • atorvastatin (LIPITOR) 40 MG Tab TAKE ONE TABLET BY MOUTH IN THE EVENING (INCREASED  DOSE) 30 Tab 3   • methocarbamol (ROBAXIN) 500 MG Tab TAKE ONE TABLET BY MOUTH TWICE DAILY AS NEEDED FOR  MUSCLE  TENSION/PAIN 60 Tab 3   • furosemide (LASIX) 20 MG Tab TAKE ONE TABLET BY MOUTH ONCE DAILY 30 Tab 3   • triamcinolone (NASACORT) 55 MCG/ACT nasal inhaler Spray 2 Sprays in nose every day. 1 Bottle 3   • citalopram (CELEXA) 40 MG Tab      • mirtazapine (REMERON) 15 MG Tab      • hydrOXYzine (ATARAX) 25 MG Tab Take 25 mg by mouth 2 Times a Day.     • albuterol (PROVENTIL) 2.5mg/3ml Nebu Soln solution for nebulization 3 mL by Nebulization route every four hours as needed for Shortness of Breath. 75 mL 3   • albuterol 108 (90 BASE) MCG/ACT Aero Soln inhalation aerosol Inhale 2 Puffs by mouth every 6 hours as needed for Shortness of Breath. 8.5 g 3   • nicotine polacrilex (NICORETTE) 4 MG gum Take 4 mg by mouth every 2 hours as needed. 270 Each 3     No current facility-administered medications for this visit.     She  has a past medical history of ETOH abuse; Anxiety; COPD (chronic obstructive pulmonary disease) (CMS-[facility]) (2/17/2015); History of substance abuse (2/17/2015); Hyperlipidemia (2/17/2015); Hyperlipidemia (2/17/2015); Cricopharyngeal achalasia (3/12/2015); Heart burn; Indigestion; Breath shortness; Snoring; Asthma; Anesthesia; Psychiatric disorder; Bipolar disorder (CMS-HCC); and Depression. She also has no past medical history of Hypertension or Diabetes.      Current medications, allergies and problems list reviewed and updated in EPIC.      ROS  "  Review of systems as documented above in history of present illness.         Objective:     Blood pressure 100/72, pulse 92, temperature 36.3 °C (97.4 °F), resp. rate 20, height 1.753 m (5' 9.02\"), weight 132.904 kg (293 lb), last menstrual period 05/19/2017, SpO2 88 %, not currently breastfeeding. Body mass index is 43.25 kg/(m^2).     Physical Exam:  Alert, oriented in no acute distress. Smells strongly of cigarette smoke.  Eye contact is good, speech goal directed, affect calm  HEENT: conjunctiva non-injected, sclera non-icteric.  Neck: No adenopathy or masses in the neck or supraclavicular regions. No JVD.  Lungs: coarse rhonchi noted with some expiratory wheezing.  CV: regular rate and rhythm.  Abdomen: Obese.  Ext: no edema, color normal, vascularity normal, temperature normal. Few superificial varicosities noted.  Skin: no rashes or lesions in visible areas.  MSK: Normal gait.       Assessment and Plan:   The following treatment plan was discussed     1. Swelling of lower extremity  Resolved now but chronic in past.    I suspect due to venous insufficiency.  We talked about reducing salt intake, elevating, use of compression stockings (Rx provided), regular exercise and weight loss to help with this.  Continue on daily Lasix as well.    NON SPECIFIED   2. Chronic obstructive pulmonary disease, unspecified COPD type (CMS-HCC)  Not stable.    A pulmonary referral will be made today due to the patient's hypoxia and subjective worsening of her COPD. I find it interesting that even in light of her smoking history that she has developed COPD at her age and feel that further evaluation is necessary especially with her family history in her mother of oxygen dependent COPD as well.    I also feel that she could likely have obstructive sleep apnea and needs to be evaluated for this. She was educated that if she is not able to get a sleep study from this referral, to contact me to put in a separate referral for this. " She voiced understanding.  We will change the Spiriva HandiHaler to the  Respimat to see if this is more helpful for her.  The importance of smoking cessation was stressed.    REFERRAL TO PULMONOLOGY    Tiotropium Bromide Monohydrate 2.5 MCG/ACT Aero Soln   3. Hypoxia  This is a new problem. See above.    REFERRAL TO PULMONOLOGY   4. Tobacco abuse  REFERRAL TO PULMONOLOGY   5. Adult BMI 40.0-44.9 kg/sq m (CMS-[facility])  Patient identified as having weight management issue.  Appropriate orders and counseling given.   6. Acute midline low back pain without sciatica  Refer back to PT per her request.    REFERRAL TO PHYSICAL THERAPY Reason for Therapy: Eval/Treat/Report   7. Mid back pain  As above.    REFERRAL TO PHYSICAL THERAPY Reason for Therapy: Eval/Treat/Report       Followup: Return in about 2 months (around 8/13/2017), or if symptoms worsen or fail to improve.          Please note that this dictation was created using voice recognition software. Every reasonable attempt has been made to correct obvious errors, however there may be errors of grammar and possibly content that were not discovered before finalizing the note.

## 2017-06-16 ENCOUNTER — HOSPITAL ENCOUNTER (OUTPATIENT)
Dept: PHYSICAL THERAPY | Facility: REHABILITATION | Age: 37
End: 2017-06-16
Attending: NURSE PRACTITIONER
Payer: MEDICAID

## 2017-06-16 PROCEDURE — 97161 PT EVAL LOW COMPLEX 20 MIN: CPT

## 2017-07-05 RX ORDER — ALBUTEROL SULFATE 90 UG/1
AEROSOL, METERED RESPIRATORY (INHALATION)
Qty: 1 INHALER | Refills: 3 | Status: SHIPPED | OUTPATIENT
Start: 2017-07-05 | End: 2018-05-03 | Stop reason: SDUPTHER

## 2017-07-05 NOTE — TELEPHONE ENCOUNTER
Was the patient seen in the last year in this department? Yes     Does patient have an active prescription for medications requested? No     Received Request Via: Pharmacy     Future Appointments       Provider Department [place]    7/6/2017 4:30 PM ZORA Simpson [facility]

## 2017-07-06 ENCOUNTER — OFFICE VISIT (OUTPATIENT)
Dept: MEDICAL GROUP | Facility: MEDICAL CENTER | Age: 37
End: 2017-07-06
Attending: NURSE PRACTITIONER
Payer: MEDICAID

## 2017-07-06 VITALS
TEMPERATURE: 97.5 F | DIASTOLIC BLOOD PRESSURE: 70 MMHG | SYSTOLIC BLOOD PRESSURE: 100 MMHG | HEART RATE: 88 BPM | HEIGHT: 69 IN | BODY MASS INDEX: 43.4 KG/M2 | OXYGEN SATURATION: 92 % | RESPIRATION RATE: 20 BRPM | WEIGHT: 293 LBS

## 2017-07-06 DIAGNOSIS — R63.2 BINGE EATING: ICD-10-CM

## 2017-07-06 DIAGNOSIS — F50.81 BINGE EATING DISORDER: ICD-10-CM

## 2017-07-06 PROCEDURE — 99214 OFFICE O/P EST MOD 30 MIN: CPT | Performed by: NURSE PRACTITIONER

## 2017-07-06 PROCEDURE — 99213 OFFICE O/P EST LOW 20 MIN: CPT | Performed by: NURSE PRACTITIONER

## 2017-07-06 RX ORDER — TOPIRAMATE 25 MG/1
TABLET ORAL
Qty: 21 TAB | Refills: 0 | Status: SHIPPED | OUTPATIENT
Start: 2017-07-06 | End: 2017-07-19 | Stop reason: SDUPTHER

## 2017-07-06 ASSESSMENT — PAIN SCALES - GENERAL: PAINLEVEL: NO PAIN

## 2017-07-06 NOTE — ASSESSMENT & PLAN NOTE
She presents today to discuss her binge eating. She states this has been a problem for years. She states that she goes to bed around 8-10 pm and then wakes up 1-2 hours later and binge eats. She states she eats every 15 minutes or so until she is full and then she falls asleep. She often vomits in the morning when she wakes up because her stomach hurts because of the nocturnal eating. She does not induce vomiting. She states that she has a new psychiatrist, Dr. Francis and has not yet been able to address this. She did work on this issue with her therapist last year but did not find it helpful. she also has chronic anxiety. She states that she continues to gain weight and reports pain in her joints, feet and back even with standing. Her family has urged her to get help with this issue.

## 2017-07-06 NOTE — MR AVS SNAPSHOT
"        Tracy Calderon   2017 4:30 PM   Office Visit   MRN: [id number]    Department[facility]  [facility]   Dept Phone:  [phone]    Description:  Female : 1980   Provider:  ZORA Simpson           Reason for Visit     Other \"I've been eating until I throw up\"      Allergies as of 2017     Allergen Noted Reactions    Penicillins 2007   Hives    Vicodin [Hydrocodone-Acetaminophen] 2015       'WIRES ME, DO NOT LIKE HOW IT MAKES ME FEEL',HYPERACTIVITY      You were diagnosed with     Binge eating disorder   [6175018]       Binge eating   [310406]         Vital Signs     Blood Pressure Pulse Temperature Respirations Height Weight    100/70 mmHg 88 36.4 °C (97.5 °F) 20 1.753 m (5' 9.02\") 134.265 kg (296 lb)    Body Mass Index Oxygen Saturation Last Menstrual Period Breastfeeding? Smoking Status       43.69 kg/m2 92% 2017 No Current Every Day Smoker       Basic Information     Date Of Birth Sex Race Ethnicity Preferred Language    1980 Female White Non- English      Your appointments     2017  2:10 PM   Established Patient with ZORA Simpson   [facility] ([facility])    [address] [place] [id number] [phone]           You will be receiving a confirmation call a few days before your appointment from our automated call confirmation system.              Problem List              ICD-10-CM Priority Class Noted - Resolved    GERD (gastroesophageal reflux disease) K21.9   2013 - Present    Tobacco abuse Z72.0   2013 - Present    Chest wall pain R07.89   2013 - Present    COPD (chronic obstructive pulmonary disease) (CMS-[facility]) J44.9   2015 - Present    Anxiety F41.9   2015 - Present    History of substance abuse Z87.898   2015 - Present    Hyperlipidemia E78.5   2015 - Present    S/P nasal septoplasty Z98.890   2015 - Present    Bilateral edema of lower extremity R60.0   8/10/2015 - " Present    Nocturnal sleep-related eating disorder G47.8   8/18/2015 - Present    Morbid obesity with BMI of 40.0-44.9, adult (CMS-[facility]) E66.01, Z68.41   3/7/2017 - Present    Dental disorder K08.9   5/17/2017 - Present    Binge eating R63.2   7/6/2017 - Present      Health Maintenance        Date Due Completion Dates    IMM DTaP/Tdap/Td Vaccine (1 - Tdap) 8/24/2021 (Originally 8/25/2011) 8/24/2011    IMM INFLUENZA (1) 9/1/2017 10/20/2016, 1/28/2016, 10/28/2014, 10/29/2013    PAP SMEAR 3/31/2018 3/31/2015, 3/31/2015            Current Immunizations     Influenza TIV (IM) 10/29/2013    Influenza Vaccine Quad Inj (Pf) 1/28/2016  1:09 PM    Influenza Vaccine Quad Inj (Preserved) 10/20/2016, 10/28/2014    Pneumococcal polysaccharide vaccine (PPSV-23) 11/23/2016    TD Vaccine 8/24/2011 10:36 AM      Below and/or attached are the medications your provider expects you to take. Review all of your home medications and newly ordered medications with your provider and/or pharmacist. Follow medication instructions as directed by your provider and/or pharmacist. Please keep your medication list with you and share with your provider. Update the information when medications are discontinued, doses are changed, or new medications (including over-the-counter products) are added; and carry medication information at all times in the event of emergency situations     Allergies:  PENICILLINS - Hives     VICODIN - (reactions not documented)               Medications  Valid as of: July 06, 2017 -  4:53 PM    Generic Name Brand Name Tablet Size Instructions for use    Albuterol Sulfate (Nebu Soln) PROVENTIL 2.5mg/3ml 3 mL by Nebulization route every four hours as needed for Shortness of Breath.        Albuterol Sulfate (Aero Soln) VENTOLIN  (90 BASE) MCG/ACT INHALE TWO PUFFS BY MOUTH EVERY 6 HOURS AS NEEDED FOR SHORTNESS OF BREATH        Atorvastatin Calcium (Tab) LIPITOR 40 MG TAKE ONE TABLET BY MOUTH ONCE DAILY IN THE EVENING   *INCREASED DOSE*        Citalopram Hydrobromide (Tab) CELEXA 40 MG         ClonazePAM (Tab) KLONOPIN 0.5 MG         Furosemide (Tab) LASIX 20 MG TAKE ONE TABLET BY MOUTH ONCE DAILY        Gabapentin (Cap) NEURONTIN 100 MG         HydrOXYzine HCl (Tab) ATARAX 25 MG Take 25 mg by mouth 2 Times a Day.        Ibuprofen (Tab) MOTRIN 800 MG Take 0.5 Tabs by mouth every 8 hours as needed.        Methocarbamol (Tab) ROBAXIN 500 MG TAKE ONE TABLET BY MOUTH TWICE DAILY AS NEEDED FOR MUSCLE TENSION/PAIN        Mirtazapine (Tab) REMERON 15 MG         Nicotine Polacrilex (Gum) NICORETTE 4 MG Take 4 mg by mouth every 2 hours as needed.        NON SPECIFIED   Bilateral knee high compression stockings for leg swelling.        Prazosin HCl (Cap) MINIPRESS 5 MG         RaNITidine HCl (Tab) ZANTAC 150 MG TAKE ONE TABLET BY MOUTH TWICE DAILY WITH MEALS        Tiotropium Bromide Monohydrate (Aero Soln) Tiotropium Bromide Monohydrate 2.5 MCG/ACT Inhale 2 Puffs by mouth every day.        Topiramate (Tab) TOPAMAX 25 MG Take 25 mg once daily for 1 week and then increase to 50 mg once daily for the second week.        Triamcinolone Acetonide (Aerosol) NASACORT 55 MCG/ACT [place] 2 Sprays in nose every day.        Zolpidem Tartrate (Tab) AMBIEN 5 MG         .                 Medicines prescribed today were sent to:     [facility] [address] [place] [address] (S), [place] - [address] [id number] [facility] (S) [place] [address]    Phone: [phone] Fax: [phone]    Open 24 Hours?: No      Medication refill instructions:       If your prescription bottle indicates you have medication refills left, it is not necessary to call your provider’s office. Please contact your pharmacy and they will refill your medication.    If your prescription bottle indicates you do not have any refills left, you may request refills at any time through one of the following ways: The online [employer] system (except Urgent Care), by calling your provider’s office, or by asking  your pharmacy to contact your provider’s office with a refill request. Medication refills are processed only during regular business hours and may not be available until the next business day. Your provider may request additional information or to have a follow-up visit with you prior to refilling your medication.   *Please Note: Medication refills are assigned a new Rx number when refilled electronically. Your pharmacy may indicate that no refills were authorized even though a new prescription for the same medication is available at the pharmacy. Please request the medicine by name with the pharmacy before contacting your provider for a refill.           MyChart Status: Patient Declined        Quit Tobacco Information     Do you want to quit using tobacco?    Quitting tobacco decreases risks of cancer, heart and lung disease, increases life expectancy, improves sense of taste and smell, and increases spending money, among other benefits.    If you are thinking about quitting, we can help.  • [facility] Quit Tobacco Program: [phone]  o Program occurs weekly for four weeks and includes pharmacist consultation on products to support quitting smoking or chewing tobacco. A provider referral is needed for pharmacist consultation.  • Tobacco Users Help Hotline: 4-297-QUIT-NOW (379-3230) or https://[place].quitlogix.org/  o Free, confidential telephone and online coaching for [place] residents. Sessions are designed on a schedule that is convenient for you. Eligible clients receive free nicotine replacement therapy.  • Nationally: www.smokefree.gov  o Information and professional assistance to support both immediate and long-term needs as you become, and remain, a non-smoker. Smokefree.gov allows you to choose the help that best fits your needs.

## 2017-07-11 NOTE — PROGRESS NOTES
"Subjective:     Chief Complaint   Patient presents with   • Other     \"I've been eating until I throw up\"     Tracy Calderon is a 36 y.o. female here today to discuss her binge eating.      Binge eating  She presents today to discuss her binge eating. She states this has been a problem for years. She states that she goes to bed around 8-10 pm and then wakes up 1-2 hours later and binge eats. She states she eats every 15 minutes or so until she is full and then she falls asleep. She often vomits in the morning when she wakes up because her stomach hurts because of the nocturnal eating. She does not induce vomiting. She states that she has a new psychiatrist, Dr. Francis and has not yet been able to address this. She did work on this issue with her therapist last year but did not find it helpful. she also has chronic anxiety. She states that she continues to gain weight and reports pain in her joints, feet and back even with standing. Her family has urged her to get help with this issue.              Current medicines (including changes today)  Current Outpatient Prescriptions   Medication Sig Dispense Refill   • topiramate (TOPAMAX) 25 MG Tab Take 25 mg once daily for 1 week and then increase to 50 mg once daily for the second week. 21 Tab 0   • VENTOLIN  (90 BASE) MCG/ACT Aero Soln inhalation aerosol INHALE TWO PUFFS BY MOUTH EVERY 6 HOURS AS NEEDED FOR SHORTNESS OF BREATH 1 Inhaler 3   • furosemide (LASIX) 20 MG Tab TAKE ONE TABLET BY MOUTH ONCE DAILY 30 Tab 3   • methocarbamol (ROBAXIN) 500 MG Tab TAKE ONE TABLET BY MOUTH TWICE DAILY AS NEEDED FOR MUSCLE TENSION/PAIN 30 Tab 3   • atorvastatin (LIPITOR) 40 MG Tab TAKE ONE TABLET BY MOUTH ONCE DAILY IN THE EVENING  *INCREASED DOSE* 30 Tab 3   • NON SPECIFIED Bilateral knee high compression stockings for leg swelling. 2 Each 0   • Tiotropium Bromide Monohydrate 2.5 MCG/ACT Aero Soln Inhale 2 Puffs by mouth every day. 1 Inhaler 6   • clonazepam " "(KLONOPIN) 0.5 MG Tab      • ibuprofen (MOTRIN) 800 MG Tab Take 0.5 Tabs by mouth every 8 hours as needed. 30 Tab 3   • ranitidine (ZANTAC) 150 MG Tab TAKE ONE TABLET BY MOUTH TWICE DAILY WITH MEALS 60 Tab 3   • gabapentin (NEURONTIN) 100 MG Cap      • prazosin (MINIPRESS) 5 MG Cap      • zolpidem (AMBIEN) 5 MG Tab      • triamcinolone (NASACORT) 55 MCG/ACT nasal inhaler Spray 2 Sprays in nose every day. 1 Bottle 3   • citalopram (CELEXA) 40 MG Tab      • mirtazapine (REMERON) 15 MG Tab      • hydrOXYzine (ATARAX) 25 MG Tab Take 25 mg by mouth 2 Times a Day.     • albuterol (PROVENTIL) 2.5mg/3ml Nebu Soln solution for nebulization 3 mL by Nebulization route every four hours as needed for Shortness of Breath. 75 mL 3   • nicotine polacrilex (NICORETTE) 4 MG gum Take 4 mg by mouth every 2 hours as needed. 270 Each 3     No current facility-administered medications for this visit.     She  has a past medical history of ETOH abuse; Anxiety; COPD (chronic obstructive pulmonary disease) (CMS-[facility]) (2/17/2015); History of substance abuse (2/17/2015); Hyperlipidemia (2/17/2015); Hyperlipidemia (2/17/2015); Cricopharyngeal achalasia (3/12/2015); Heart burn; Indigestion; Breath shortness; Snoring; Asthma; Anesthesia; Psychiatric disorder; Bipolar disorder (CMS-HCC); Depression; and Binge eating (7/6/2017). She also has no past medical history of Hypertension or Diabetes.      Current medications, allergies and problems list reviewed and updated in EPIC.      ROS     No chest pain, no abdominal pain, no cough.  Other ROS as noted above in HPI.         Objective:     Blood pressure 100/70, pulse 88, temperature 36.4 °C (97.5 °F), resp. rate 20, height 1.753 m (5' 9.02\"), weight 134.265 kg (296 lb), last menstrual period 06/20/2017, SpO2 92 %, not currently breastfeeding. Body mass index is 43.69 kg/(m^2).     Physical Exam:  Alert, oriented in no acute distress.  Eye contact is good, speech goal directed, affect calm  HEENT: " conjunctiva non-injected, sclera non-icteric.  Oral mucous membranes pink and moist with no lesions.  Neck: Supple.  Lungs: coarse rhonchi noted throughout.  CV: regular rate and rhythm.  Abdomen: soft, nontender, obese.  Ext: no edema  Skin: no rashes or lesions in visible areas.  MSK: Normal gait.       Assessment and Plan:   The following treatment plan was discussed     1. Binge eating disorder  this is a long-standing problem for her that is not controlled. I have encouraged her to discuss it with her psychiatrist when able. I have also encouraged her to inquire if there is a therapist that specializes in eating disorders that she can get established with at her current office as this would likely be more helpful for her.  We discussed medication options and she would like to try something dating that she just does not feel she can tackle this problem on her own.  Will start Topamax and increase weekly and titrate to effect.    topiramate (TOPAMAX) 25 MG Tab  Discussed dosing/side effects. Pt instructed to call clinic with any adverse effects    FU in 2 weeks.              Followup: Return in about 2 weeks (around 7/20/2017), or if symptoms worsen or fail to improve.          Please note that this dictation was created using voice recognition software. Every reasonable attempt has been made to correct obvious errors, however there may be errors of grammar and possibly content that were not discovered before finalizing the note.

## 2017-07-19 ENCOUNTER — OFFICE VISIT (OUTPATIENT)
Dept: MEDICAL GROUP | Facility: MEDICAL CENTER | Age: 37
End: 2017-07-19
Attending: NURSE PRACTITIONER
Payer: MEDICAID

## 2017-07-19 VITALS
BODY MASS INDEX: 43.4 KG/M2 | WEIGHT: 293 LBS | RESPIRATION RATE: 20 BRPM | HEIGHT: 69 IN | HEART RATE: 100 BPM | TEMPERATURE: 97.3 F | SYSTOLIC BLOOD PRESSURE: 102 MMHG | DIASTOLIC BLOOD PRESSURE: 62 MMHG | OXYGEN SATURATION: 94 %

## 2017-07-19 DIAGNOSIS — F50.81 BINGE EATING DISORDER: ICD-10-CM

## 2017-07-19 DIAGNOSIS — R63.2 BINGE EATING: ICD-10-CM

## 2017-07-19 PROCEDURE — 99213 OFFICE O/P EST LOW 20 MIN: CPT | Performed by: NURSE PRACTITIONER

## 2017-07-19 RX ORDER — TOPIRAMATE 50 MG/1
50 TABLET, FILM COATED ORAL 2 TIMES DAILY
Qty: 60 TAB | Refills: 0 | Status: SHIPPED | OUTPATIENT
Start: 2017-07-19 | End: 2017-08-02 | Stop reason: SDUPTHER

## 2017-07-19 ASSESSMENT — PAIN SCALES - GENERAL: PAINLEVEL: NO PAIN

## 2017-07-19 NOTE — ASSESSMENT & PLAN NOTE
The patient is in the clinic today for FU for binge eating. She was started on Topamax for this 2 weeks ago and has increased the dose to 50 mg daily as directed. She denies any side effects. She does report that she seems to feel like she is getting waters faster and that food does not taste as good to her and she is hopeful it is going to help her. Her weight reflects a 1# decrease since her last appt.  She denies any increase in her depressive symptoms. She reports that she did talk to her psychiatry office and they are hoping to help her locate a therapist who specializes in eating disorders.

## 2017-07-19 NOTE — PROGRESS NOTES
Subjective:     Chief Complaint   Patient presents with   • Follow-Up     Tracy Calderon is a 36 y.o. female here today for FU for binge eating.    Binge eating  The patient is in the clinic today for FU for binge eating. She was started on Topamax for this 2 weeks ago and has increased the dose to 50 mg daily as directed. She denies any side effects. She does report that she seems to feel like she is getting waters faster and that food does not taste as good to her and she is hopeful it is going to help her. Her weight reflects a 1# decrease since her last appt.  She denies any increase in her depressive symptoms. She reports that she did talk to her psychiatry office and they are hoping to help her locate a therapist who specializes in eating disorders.             Current medicines (including changes today)  Current Outpatient Prescriptions   Medication Sig Dispense Refill   • topiramate (TOPAMAX) 50 MG tablet Take 1 Tab by mouth 2 times a day. 60 Tab 0   • VENTOLIN  (90 BASE) MCG/ACT Aero Soln inhalation aerosol INHALE TWO PUFFS BY MOUTH EVERY 6 HOURS AS NEEDED FOR SHORTNESS OF BREATH 1 Inhaler 3   • furosemide (LASIX) 20 MG Tab TAKE ONE TABLET BY MOUTH ONCE DAILY 30 Tab 3   • methocarbamol (ROBAXIN) 500 MG Tab TAKE ONE TABLET BY MOUTH TWICE DAILY AS NEEDED FOR MUSCLE TENSION/PAIN 30 Tab 3   • atorvastatin (LIPITOR) 40 MG Tab TAKE ONE TABLET BY MOUTH ONCE DAILY IN THE EVENING  *INCREASED DOSE* 30 Tab 3   • NON SPECIFIED Bilateral knee high compression stockings for leg swelling. 2 Each 0   • Tiotropium Bromide Monohydrate 2.5 MCG/ACT Aero Soln Inhale 2 Puffs by mouth every day. 1 Inhaler 6   • clonazepam (KLONOPIN) 0.5 MG Tab      • ibuprofen (MOTRIN) 800 MG Tab Take 0.5 Tabs by mouth every 8 hours as needed. 30 Tab 3   • ranitidine (ZANTAC) 150 MG Tab TAKE ONE TABLET BY MOUTH TWICE DAILY WITH MEALS 60 Tab 3   • gabapentin (NEURONTIN) 100 MG Cap      • prazosin (MINIPRESS) 5 MG Cap      •  "zolpidem (AMBIEN) 5 MG Tab      • triamcinolone (NASACORT) 55 MCG/ACT nasal inhaler Spray 2 Sprays in nose every day. 1 Bottle 3   • citalopram (CELEXA) 40 MG Tab      • mirtazapine (REMERON) 15 MG Tab      • hydrOXYzine (ATARAX) 25 MG Tab Take 25 mg by mouth 2 Times a Day.     • albuterol (PROVENTIL) 2.5mg/3ml Nebu Soln solution for nebulization 3 mL by Nebulization route every four hours as needed for Shortness of Breath. 75 mL 3   • nicotine polacrilex (NICORETTE) 4 MG gum Take 4 mg by mouth every 2 hours as needed. 270 Each 3     No current facility-administered medications for this visit.     She  has a past medical history of ETOH abuse; Anxiety; COPD (chronic obstructive pulmonary disease) (CMS-HCC) (2/17/2015); History of substance abuse (2/17/2015); Hyperlipidemia (2/17/2015); Hyperlipidemia (2/17/2015); Cricopharyngeal achalasia (3/12/2015); Heart burn; Indigestion; Breath shortness; Snoring; Asthma; Anesthesia; Psychiatric disorder; Bipolar disorder (CMS-HCC); Depression; and Binge eating (7/6/2017). She also has no past medical history of Hypertension or Diabetes.      Current medications, allergies and problems list reviewed and updated in [facility].      ROS   Review of systems as documented above in history of present illness.         Objective:     Blood pressure 102/62, pulse 100, temperature 36.3 °C (97.3 °F), resp. rate 20, height 1.753 m (5' 9.02\"), weight 133.811 kg (295 lb), last menstrual period 06/20/2017, SpO2 94 %. Body mass index is 43.54 kg/(m^2).     Physical Exam:  Alert, oriented in no acute distress.  Eye contact is good, speech goal directed, affect calm  HEENT: conjunctiva non-injected, sclera non-icteric.  Neck: Supple.  Lungs: scattered rhonchi noted. Adequate oxygenation on RA.  CV: regular rate and rhythm.  Abdomen: obese, soft.  Ext: no edema  Skin: no rashes or lesions in visible areas.  MSK: Normal gait.       Assessment and Plan:   The following treatment plan was discussed "     1. Binge eating disorder  She has noticed some improvement on the Topamax and seems to be tolerating it well.  Will increase dose to 50 mg BID and FU in 2 weeks. Per Up-to-Date we should be shooting for a goal of 400 mg/day.  She was advised to have healthy snack options in her home and avoid having junk food on hand.  She is to continue working with her psychiatry office to locate a therapist who specializes in eating disorders. She voiced understanding.    topiramate (TOPAMAX) 50 MG tablet            Followup: Return in about 2 weeks (around 8/2/2017), or if symptoms worsen or fail to improve.          Please note that this dictation was created using voice recognition software. Every reasonable attempt has been made to correct obvious errors, however there may be errors of grammar and possibly content that were not discovered before finalizing the note.

## 2017-07-19 NOTE — MR AVS SNAPSHOT
"        Tracy Calderon   2017 2:10 PM   Office Visit   MRN: [id number]    [facility]   Dept Phone:  [phone]    Description:  Female : 1980   Provider:  YO Simpson.           Reason for Visit     Follow-Up           Allergies as of 2017     Allergen Noted Reactions    Penicillins 2007   Hives    Vicodin [Hydrocodone-Acetaminophen] 2015       'WIRES ME, DO NOT LIKE HOW IT MAKES ME FEEL',HYPERACTIVITY      You were diagnosed with     Binge eating disorder   [6881784]       Binge eating   [840933]         Vital Signs     Blood Pressure Pulse Temperature Respirations Height Weight    102/62 mmHg 100 36.3 °C (97.3 °F) 20 1.753 m (5' 9.02\") 133.811 kg (295 lb)    Body Mass Index Oxygen Saturation Last Menstrual Period Smoking Status          43.54 kg/m2 94% 2017 Current Every Day Smoker        Basic Information     Date Of Birth Sex Race Ethnicity Preferred Language    1980 Female White Non- English      Your appointments     Aug 02, 2017  2:10 PM   Established Patient with ZORA Simpson   [facility])    [address] [place] [id number] [phone]           You will be receiving a confirmation call a few days before your appointment from our automated call confirmation system.              Problem List              ICD-10-CM Priority Class Noted - Resolved    GERD (gastroesophageal reflux disease) K21.9   2013 - Present    Tobacco abuse Z72.0   2013 - Present    Chest wall pain R07.89   2013 - Present    COPD (chronic obstructive pulmonary disease) (CMS-[facility]) J44.9   2015 - Present    Anxiety F41.9   2015 - Present    History of substance abuse Z87.898   2015 - Present    Hyperlipidemia E78.5   2015 - Present    S/P nasal septoplasty Z98.890   2015 - Present    Bilateral edema of lower extremity R60.0   8/10/2015 - Present    Nocturnal sleep-related " eating disorder G47.8   8/18/2015 - Present    Morbid obesity with BMI of 40.0-44.9, adult (CMS-[facility]) E66.01, Z68.41   3/7/2017 - Present    Dental disorder K08.9   5/17/2017 - Present    Binge eating R63.2   7/6/2017 - Present      Health Maintenance        Date Due Completion Dates    IMM DTaP/Tdap/Td Vaccine (1 - Tdap) 8/24/2021 (Originally 8/25/2011) 8/24/2011    IMM INFLUENZA (1) 9/1/2017 10/20/2016, 1/28/2016, 10/28/2014, 10/29/2013    PAP SMEAR 3/31/2018 3/31/2015, 3/31/2015            Current Immunizations     Influenza TIV (IM) 10/29/2013    Influenza Vaccine Quad Inj (Pf) 1/28/2016  1:09 PM    Influenza Vaccine Quad Inj (Preserved) 10/20/2016, 10/28/2014    Pneumococcal polysaccharide vaccine (PPSV-23) 11/23/2016    TD Vaccine 8/24/2011 10:36 AM      Below and/or attached are the medications your provider expects you to take. Review all of your home medications and newly ordered medications with your provider and/or pharmacist. Follow medication instructions as directed by your provider and/or pharmacist. Please keep your medication list with you and share with your provider. Update the information when medications are discontinued, doses are changed, or new medications (including over-the-counter products) are added; and carry medication information at all times in the event of emergency situations     Allergies:  PENICILLINS - Hives     VICODIN - (reactions not documented)               Medications  Valid as of: July 19, 2017 -  2:29 PM    Generic Name Brand Name Tablet Size Instructions for use    Albuterol Sulfate (Nebu Soln) PROVENTIL 2.5mg/3ml 3 mL by Nebulization route every four hours as needed for Shortness of Breath.        Albuterol Sulfate (Aero Soln) VENTOLIN  (90 BASE) MCG/ACT INHALE TWO PUFFS BY MOUTH EVERY 6 HOURS AS NEEDED FOR SHORTNESS OF BREATH        Atorvastatin Calcium (Tab) LIPITOR 40 MG TAKE ONE TABLET BY MOUTH ONCE DAILY IN THE EVENING  *INCREASED DOSE*        Citalopram  Hydrobromide (Tab) CELEXA 40 MG         ClonazePAM (Tab) KLONOPIN 0.5 MG         Furosemide (Tab) LASIX 20 MG TAKE ONE TABLET BY MOUTH ONCE DAILY        Gabapentin (Cap) NEURONTIN 100 MG         HydrOXYzine HCl (Tab) ATARAX 25 MG Take 25 mg by mouth 2 Times a Day.        Ibuprofen (Tab) MOTRIN 800 MG Take 0.5 Tabs by mouth every 8 hours as needed.        Methocarbamol (Tab) ROBAXIN 500 MG TAKE ONE TABLET BY MOUTH TWICE DAILY AS NEEDED FOR MUSCLE TENSION/PAIN        Mirtazapine (Tab) REMERON 15 MG         Nicotine Polacrilex (Gum) NICORETTE 4 MG Take 4 mg by mouth every 2 hours as needed.        NON SPECIFIED   Bilateral knee high compression stockings for leg swelling.        Prazosin HCl (Cap) MINIPRESS 5 MG         RaNITidine HCl (Tab) ZANTAC 150 MG TAKE ONE TABLET BY MOUTH TWICE DAILY WITH MEALS        Tiotropium Bromide Monohydrate (Aero Soln) Tiotropium Bromide Monohydrate 2.5 MCG/ACT Inhale 2 Puffs by mouth every day.        Topiramate (Tab) TOPAMAX 50 MG Take 1 Tab by mouth 2 times a day.        Triamcinolone Acetonide (Aerosol) NASACORT 55 MCG/ACT [place] 2 Sprays in nose every day.        Zolpidem Tartrate (Tab) AMBIEN 5 MG         .                 Medicines prescribed today were sent to:     [facility] [address] [place] [address] (S), [place] - [address] [id number] [facility] (S) [place] [address]    Phone: [phone] Fax: [phone]    Open 24 Hours?: No      Medication refill instructions:       If your prescription bottle indicates you have medication refills left, it is not necessary to call your provider’s office. Please contact your pharmacy and they will refill your medication.    If your prescription bottle indicates you do not have any refills left, you may request refills at any time through one of the following ways: The online [employer] system (except Urgent Care), by calling your provider’s office, or by asking your pharmacy to contact your provider’s office with a refill request. Medication refills are  processed only during regular business hours and may not be available until the next business day. Your provider may request additional information or to have a follow-up visit with you prior to refilling your medication.   *Please Note: Medication refills are assigned a new Rx number when refilled electronically. Your pharmacy may indicate that no refills were authorized even though a new prescription for the same medication is available at the pharmacy. Please request the medicine by name with the pharmacy before contacting your provider for a refill.           MyChart Status: Patient Declined        Quit Tobacco Information     Do you want to quit using tobacco?    Quitting tobacco decreases risks of cancer, heart and lung disease, increases life expectancy, improves sense of taste and smell, and increases spending money, among other benefits.    If you are thinking about quitting, we can help.  • [employer] Quit Tobacco Program: 905.263.2394  o Program occurs weekly for four weeks and includes pharmacist consultation on products to support quitting smoking or chewing tobacco. A provider referral is needed for pharmacist consultation.  • Tobacco Users Help Hotline: 7-013-QUITNOW (581-4806) or https://[place].quitlogix.org/  o Free, confidential telephone and online coaching for [place] residents. Sessions are designed on a schedule that is convenient for you. Eligible clients receive free nicotine replacement therapy.  • Nationally: www.smokefree.gov  o Information and professional assistance to support both immediate and long-term needs as you become, and remain, a non-smoker. Smokefree.gov allows you to choose the help that best fits your needs.

## 2017-07-25 ENCOUNTER — TELEPHONE (OUTPATIENT)
Dept: MEDICAL GROUP | Facility: MEDICAL CENTER | Age: 37
End: 2017-07-25

## 2017-07-25 DIAGNOSIS — R06.83 SNORING: ICD-10-CM

## 2017-07-25 DIAGNOSIS — R09.02 HYPOXIA: ICD-10-CM

## 2017-07-25 DIAGNOSIS — E66.01 MORBID OBESITY WITH BMI OF 40.0-44.9, ADULT (HCC): ICD-10-CM

## 2017-07-25 DIAGNOSIS — Z72.0 TOBACCO ABUSE: ICD-10-CM

## 2017-07-25 DIAGNOSIS — J44.9 CHRONIC OBSTRUCTIVE PULMONARY DISEASE, UNSPECIFIED COPD TYPE (HCC): ICD-10-CM

## 2017-07-25 NOTE — TELEPHONE ENCOUNTER
Pt. Called in requesting a referral for an order for [facility] pulmonary. Thank you, please advise.

## 2017-08-02 ENCOUNTER — OFFICE VISIT (OUTPATIENT)
Dept: MEDICAL GROUP | Facility: MEDICAL CENTER | Age: 37
End: 2017-08-02
Attending: NURSE PRACTITIONER
Payer: MEDICAID

## 2017-08-02 VITALS
OXYGEN SATURATION: 90 % | RESPIRATION RATE: 20 BRPM | HEART RATE: 100 BPM | DIASTOLIC BLOOD PRESSURE: 64 MMHG | BODY MASS INDEX: 42.8 KG/M2 | SYSTOLIC BLOOD PRESSURE: 100 MMHG | HEIGHT: 69 IN | WEIGHT: 289 LBS | TEMPERATURE: 97 F

## 2017-08-02 DIAGNOSIS — F50.81 BINGE EATING DISORDER: ICD-10-CM

## 2017-08-02 PROCEDURE — 99213 OFFICE O/P EST LOW 20 MIN: CPT | Performed by: NURSE PRACTITIONER

## 2017-08-02 PROCEDURE — 99214 OFFICE O/P EST MOD 30 MIN: CPT | Performed by: NURSE PRACTITIONER

## 2017-08-02 RX ORDER — TOPIRAMATE 50 MG/1
TABLET, FILM COATED ORAL
Qty: 90 TAB | Refills: 1 | Status: SHIPPED | OUTPATIENT
Start: 2017-08-02 | End: 2017-09-06 | Stop reason: SDUPTHER

## 2017-08-02 ASSESSMENT — PAIN SCALES - GENERAL: PAINLEVEL: NO PAIN

## 2017-08-02 NOTE — ASSESSMENT & PLAN NOTE
She has a known binge eating disorder and is currently taking Topamax 50 mg twice daily for this problem. She states she has noticed a significant difference stating that she is feeling full for the first time in a long time. She still struggles more at night to control her overeating. She states both her mom and dad have noticed that she is eating less. Her weight is down 6# from her appt 2 weeks ago. She states she has noticed slightly drier mouth but other than that no side effects. She discussed the Topamax prescription with her psychiatrist at her recent FU and she was in support of it. They are hoping to find her a therapist that specializes in eating disorders.

## 2017-08-02 NOTE — MR AVS SNAPSHOT
"        Tracy Calderon   2017 2:10 PM   Office Visit   MRN: [id number]    [facility]   Dept Phone:  [phone]    Description:  Female : 1980   Provider:  ZORA Simpson           Reason for Visit     Follow-Up discuss meds      Allergies as of 2017     Allergen Noted Reactions    Penicillins 2007   Hives    Vicodin [Hydrocodone-Acetaminophen] 2015       'WIRES ME, DO NOT LIKE HOW IT MAKES ME FEEL',HYPERACTIVITY      You were diagnosed with     Binge eating   [561961]       Binge eating disorder   [7281213]         Vital Signs     Blood Pressure Pulse Temperature Respirations Height Weight    100/64 mmHg 100 36.1 °C (97 °F) 20 1.753 m (5' 9.02\") 131.09 kg (289 lb)    Body Mass Index Oxygen Saturation Last Menstrual Period Breastfeeding? Smoking Status       42.66 kg/m2 90% 2017 No Current Every Day Smoker       Basic Information     Date Of Birth Sex Race Ethnicity Preferred Language    1980 Female White Non- English      Your appointments     Sep 06, 2017  2:10 PM   Established Patient with ZORA Simpson   [facility] ([facility])    [address] [place] [address] [id number] [phone]           You will be receiving a confirmation call a few days before your appointment from our automated call confirmation system.              Problem List              ICD-10-CM Priority Class Noted - Resolved    GERD (gastroesophageal reflux disease) K21.9   2013 - Present    Tobacco abuse Z72.0   2013 - Present    Chest wall pain R07.89   2013 - Present    COPD (chronic obstructive pulmonary disease) (CMS-[facility]) J44.9   2015 - Present    Anxiety F41.9   2015 - Present    History of substance abuse Z87.898   2015 - Present    Hyperlipidemia E78.5   2015 - Present    S/P nasal septoplasty Z98.890   2015 - Present    Bilateral edema of lower extremity R60.0   8/10/2015 - Present    Nocturnal " sleep-related eating disorder G47.8   8/18/2015 - Present    Morbid obesity with BMI of 40.0-44.9, adult (CMS-[facility]) E66.01, Z68.41   3/7/2017 - Present    Dental disorder K08.9   5/17/2017 - Present    Binge eating disorder F50.81   7/6/2017 - Present      Health Maintenance        Date Due Completion Dates    IMM DTaP/Tdap/Td Vaccine (1 - Tdap) 8/24/2021 (Originally 8/25/2011) 8/24/2011    IMM INFLUENZA (1) 9/1/2017 10/20/2016, 1/28/2016, 10/28/2014, 10/29/2013    PAP SMEAR 3/31/2018 3/31/2015, 3/31/2015            Current Immunizations     Influenza TIV (IM) 10/29/2013    Influenza Vaccine Quad Inj (Pf) 1/28/2016  1:09 PM    Influenza Vaccine Quad Inj (Preserved) 10/20/2016, 10/28/2014    Pneumococcal polysaccharide vaccine (PPSV-23) 11/23/2016    TD Vaccine 8/24/2011 10:36 AM      Below and/or attached are the medications your provider expects you to take. Review all of your home medications and newly ordered medications with your provider and/or pharmacist. Follow medication instructions as directed by your provider and/or pharmacist. Please keep your medication list with you and share with your provider. Update the information when medications are discontinued, doses are changed, or new medications (including over-the-counter products) are added; and carry medication information at all times in the event of emergency situations     Allergies:  PENICILLINS - Hives     VICODIN - (reactions not documented)               Medications  Valid as of: August 02, 2017 -  2:24 PM    Generic Name Brand Name Tablet Size Instructions for use    Albuterol Sulfate (Nebu Soln) PROVENTIL 2.5mg/3ml 3 mL by Nebulization route every four hours as needed for Shortness of Breath.        Albuterol Sulfate (Aero Soln) VENTOLIN  (90 BASE) MCG/ACT INHALE TWO PUFFS BY MOUTH EVERY 6 HOURS AS NEEDED FOR SHORTNESS OF BREATH        Atorvastatin Calcium (Tab) LIPITOR 40 MG TAKE ONE TABLET BY MOUTH ONCE DAILY IN THE EVENING  *INCREASED  DOSE*        Citalopram Hydrobromide (Tab) CELEXA 40 MG         ClonazePAM (Tab) KLONOPIN 0.5 MG         Furosemide (Tab) LASIX 20 MG TAKE ONE TABLET BY MOUTH ONCE DAILY        Gabapentin (Cap) NEURONTIN 100 MG         HydrOXYzine HCl (Tab) ATARAX 25 MG Take 25 mg by mouth 2 Times a Day.        Ibuprofen (Tab) MOTRIN 800 MG Take 0.5 Tabs by mouth every 8 hours as needed.        Methocarbamol (Tab) ROBAXIN 500 MG TAKE ONE TABLET BY MOUTH TWICE DAILY AS NEEDED FOR MUSCLE TENSION/PAIN        Mirtazapine (Tab) REMERON 15 MG         Nicotine Polacrilex (Gum) NICORETTE 4 MG Take 4 mg by mouth every 2 hours as needed.        NON SPECIFIED   Bilateral knee high compression stockings for leg swelling.        Prazosin HCl (Cap) MINIPRESS 5 MG         RaNITidine HCl (Tab) ZANTAC 150 MG TAKE ONE TABLET BY MOUTH TWICE DAILY WITH MEALS        Tiotropium Bromide Monohydrate (Aero Soln) Tiotropium Bromide Monohydrate 2.5 MCG/ACT Inhale 2 Puffs by mouth every day.        Topiramate (Tab) TOPAMAX 50 MG Take 50 mg in the AM and 100 mg in the PM.        Triamcinolone Acetonide (Aerosol) NASACORT 55 MCG/ACT [place] 2 Sprays in nose every day.        Zolpidem Tartrate (Tab) AMBIEN 5 MG         .                 Medicines prescribed today were sent to:     [facility] [address] [place] [address] (S), [place] - [address] [id number] [facility] (S) [place] [address]    Phone: [phone] Fax: [phone]    Open 24 Hours?: No      Medication refill instructions:       If your prescription bottle indicates you have medication refills left, it is not necessary to call your provider’s office. Please contact your pharmacy and they will refill your medication.    If your prescription bottle indicates you do not have any refills left, you may request refills at any time through one of the following ways: The online [employer] system (except Urgent Care), by calling your provider’s office, or by asking your pharmacy to contact your provider’s office with a refill  request. Medication refills are processed only during regular business hours and may not be available until the next business day. Your provider may request additional information or to have a follow-up visit with you prior to refilling your medication.   *Please Note: Medication refills are assigned a new Rx number when refilled electronically. Your pharmacy may indicate that no refills were authorized even though a new prescription for the same medication is available at the pharmacy. Please request the medicine by name with the pharmacy before contacting your provider for a refill.           MyChart Status: Patient Declined        Quit Tobacco Information     Do you want to quit using tobacco?    Quitting tobacco decreases risks of cancer, heart and lung disease, increases life expectancy, improves sense of taste and smell, and increases spending money, among other benefits.    If you are thinking about quitting, we can help.  • [employer] Quit Tobacco Program: 320.380.1183  o Program occurs weekly for four weeks and includes pharmacist consultation on products to support quitting smoking or chewing tobacco. A provider referral is needed for pharmacist consultation.  • Tobacco Users Help Hotline: 3-587-QUIT-NOW (572-5680) or https://[place].quitlogix.org/  o Free, confidential telephone and online coaching for [place] residents. Sessions are designed on a schedule that is convenient for you. Eligible clients receive free nicotine replacement therapy.  • Nationally: www.smokefree.gov  o Information and professional assistance to support both immediate and long-term needs as you become, and remain, a non-smoker. Smokefree.gov allows you to choose the help that best fits your needs.

## 2017-08-02 NOTE — PROGRESS NOTES
Subjective:     Chief Complaint   Patient presents with   • Follow-Up     discuss holger Pa Jenise Calderon is a 36 y.o. female here today for FU for binge eating disorder.      Binge eating disorder  She has a known binge eating disorder and is currently taking Topamax 50 mg twice daily for this problem. She states she has noticed a significant difference stating that she is feeling full for the first time in a long time. She still struggles more at night to control her overeating. She states both her mom and dad have noticed that she is eating less. Her weight is down 6# from her appt 2 weeks ago. She states she has noticed slightly drier mouth but other than that no side effects. She discussed the Topamax prescription with her psychiatrist at her recent FU and she was in support of it. They are hoping to find her a therapist that specializes in eating disorders.             Current medicines (including changes today)  Current Outpatient Prescriptions   Medication Sig Dispense Refill   • topiramate (TOPAMAX) 50 MG tablet Take 50 mg in the AM and 100 mg in the PM. 90 Tab 1   • VENTOLIN  (90 BASE) MCG/ACT Aero Soln inhalation aerosol INHALE TWO PUFFS BY MOUTH EVERY 6 HOURS AS NEEDED FOR SHORTNESS OF BREATH 1 Inhaler 3   • furosemide (LASIX) 20 MG Tab TAKE ONE TABLET BY MOUTH ONCE DAILY 30 Tab 3   • methocarbamol (ROBAXIN) 500 MG Tab TAKE ONE TABLET BY MOUTH TWICE DAILY AS NEEDED FOR MUSCLE TENSION/PAIN 30 Tab 3   • atorvastatin (LIPITOR) 40 MG Tab TAKE ONE TABLET BY MOUTH ONCE DAILY IN THE EVENING  *INCREASED DOSE* 30 Tab 3   • NON SPECIFIED Bilateral knee high compression stockings for leg swelling. 2 Each 0   • Tiotropium Bromide Monohydrate 2.5 MCG/ACT Aero Soln Inhale 2 Puffs by mouth every day. 1 Inhaler 6   • clonazepam (KLONOPIN) 0.5 MG Tab      • ibuprofen (MOTRIN) 800 MG Tab Take 0.5 Tabs by mouth every 8 hours as needed. 30 Tab 3   • ranitidine (ZANTAC) 150 MG Tab TAKE ONE TABLET BY MOUTH  "TWICE DAILY WITH MEALS 60 Tab 3   • gabapentin (NEURONTIN) 100 MG Cap      • prazosin (MINIPRESS) 5 MG Cap      • zolpidem (AMBIEN) 5 MG Tab      • triamcinolone (NASACORT) 55 MCG/ACT nasal inhaler Spray 2 Sprays in nose every day. 1 Bottle 3   • citalopram (CELEXA) 40 MG Tab      • mirtazapine (REMERON) 15 MG Tab      • hydrOXYzine (ATARAX) 25 MG Tab Take 25 mg by mouth 2 Times a Day.     • albuterol (PROVENTIL) 2.5mg/3ml Nebu Soln solution for nebulization 3 mL by Nebulization route every four hours as needed for Shortness of Breath. 75 mL 3   • nicotine polacrilex (NICORETTE) 4 MG gum Take 4 mg by mouth every 2 hours as needed. 270 Each 3     No current facility-administered medications for this visit.     She  has a past medical history of ETOH abuse; Anxiety; COPD (chronic obstructive pulmonary disease) (CMS-HCC) (2/17/2015); History of substance abuse (2/17/2015); Hyperlipidemia (2/17/2015); Hyperlipidemia (2/17/2015); Cricopharyngeal achalasia (3/12/2015); Heart burn; Indigestion; Breath shortness; Snoring; Asthma; Anesthesia; Psychiatric disorder; Bipolar disorder (CMS-HCC); Depression; and Binge eating (7/6/2017). She also has no past medical history of Hypertension or Diabetes.      Current medications, allergies and problems list reviewed and updated in EPIC.      ROS     No chest pain, no increased shortness of breath, no abdominal pain  Other ROS as noted above in HPI.       Objective:     Blood pressure 100/64, pulse 100, temperature 36.1 °C (97 °F), resp. rate 20, height 1.753 m (5' 9.02\"), weight 131.09 kg (289 lb), last menstrual period 06/20/2017, SpO2 90 %, not currently breastfeeding. Body mass index is 42.66 kg/(m^2).     Physical Exam:  Alert, oriented in no acute distress. Obese.  Eye contact is good, speech goal directed, affect calm. Pleasant.  HEENT: conjunctiva non-injected, sclera non-icteric.  Oral mucous membranes pink and moist with no lesions.  Neck: Supple.  Lungs: clear to " auscultation bilaterally with good excursion.  CV: regular rate and rhythm.  Abdomen: soft  MSK: Normal gait.       Assessment and Plan:   The following treatment plan was discussed     1. Binge eating disorder  She notices decreased appetite and that she is achieving satiety easier since starting the Topamax. She continues to have more difficulty at night. Will continue on 50 mg in the morning and increase to 100 mg at night.  She is encouraged to continue to work with her psychiatrist and hopefully they will find a psychologist or therapist that is skilled in eating disorders.  Follow-up in one month.  topiramate (TOPAMAX) 50 MG tablet       Followup: Return in about 4 weeks (around 8/30/2017), or if symptoms worsen or fail to improve.          Please note that this dictation was created using voice recognition software. Every reasonable attempt has been made to correct obvious errors, however there may be errors of grammar and possibly content that were not discovered before finalizing the note.

## 2017-08-18 NOTE — TELEPHONE ENCOUNTER
Was the patient seen in the last year in this department? Yes     Does patient have an active prescription for medications requested? No     Received Request Via: Pharmacy     Future Appointments       Provider Department [place]    9/6/2017 2:10 PM ZORA Simpson [facility]

## 2017-08-22 RX ORDER — ALBUTEROL SULFATE 2.5 MG/3ML
SOLUTION RESPIRATORY (INHALATION)
Qty: 360 ML | Refills: 3 | Status: SHIPPED | OUTPATIENT
Start: 2017-08-22 | End: 2018-11-01 | Stop reason: SDUPTHER

## 2017-08-25 NOTE — TELEPHONE ENCOUNTER
Was the patient seen in the last year in this department? Yes     Does patient have an active prescription for medications requested? No     Received Request Via: Pharmacy     Future Appointments       Provider Department [place]    9/6/2017 2:10 PM ZORA Simpson [facility]

## 2017-08-29 RX ORDER — METHOCARBAMOL 500 MG/1
TABLET, FILM COATED ORAL
Qty: 30 TAB | Refills: 3 | Status: SHIPPED | OUTPATIENT
Start: 2017-08-29 | End: 2017-11-30

## 2017-09-06 ENCOUNTER — OFFICE VISIT (OUTPATIENT)
Dept: MEDICAL GROUP | Facility: MEDICAL CENTER | Age: 37
End: 2017-09-06
Attending: NURSE PRACTITIONER
Payer: MEDICAID

## 2017-09-06 VITALS
HEIGHT: 69 IN | WEIGHT: 293 LBS | BODY MASS INDEX: 43.4 KG/M2 | HEART RATE: 80 BPM | OXYGEN SATURATION: 93 % | DIASTOLIC BLOOD PRESSURE: 70 MMHG | SYSTOLIC BLOOD PRESSURE: 104 MMHG | TEMPERATURE: 97 F | RESPIRATION RATE: 20 BRPM

## 2017-09-06 DIAGNOSIS — E66.2 OBESITY HYPOVENTILATION SYNDROME (HCC): ICD-10-CM

## 2017-09-06 DIAGNOSIS — K59.00 CONSTIPATION, UNSPECIFIED CONSTIPATION TYPE: ICD-10-CM

## 2017-09-06 DIAGNOSIS — M54.50 ACUTE MIDLINE LOW BACK PAIN WITHOUT SCIATICA: ICD-10-CM

## 2017-09-06 DIAGNOSIS — F50.81 BINGE EATING DISORDER: ICD-10-CM

## 2017-09-06 DIAGNOSIS — Z23 NEED FOR VACCINATION: ICD-10-CM

## 2017-09-06 PROBLEM — K08.9 DENTAL DISORDER: Status: RESOLVED | Noted: 2017-05-17 | Resolved: 2017-09-06

## 2017-09-06 PROCEDURE — 99214 OFFICE O/P EST MOD 30 MIN: CPT | Mod: 25 | Performed by: NURSE PRACTITIONER

## 2017-09-06 PROCEDURE — 90471 IMMUNIZATION ADMIN: CPT | Performed by: NURSE PRACTITIONER

## 2017-09-06 PROCEDURE — 90686 IIV4 VACC NO PRSV 0.5 ML IM: CPT | Performed by: NURSE PRACTITIONER

## 2017-09-06 PROCEDURE — 99214 OFFICE O/P EST MOD 30 MIN: CPT | Performed by: NURSE PRACTITIONER

## 2017-09-06 RX ORDER — POLYETHYLENE GLYCOL 3350 17 G/17G
17 POWDER, FOR SOLUTION ORAL DAILY
Qty: 1 BOTTLE | Refills: 3 | Status: SHIPPED | OUTPATIENT
Start: 2017-09-06 | End: 2017-11-30

## 2017-09-06 RX ORDER — TOPIRAMATE 100 MG/1
100 TABLET, FILM COATED ORAL 2 TIMES DAILY
Qty: 60 TAB | Refills: 3 | Status: SHIPPED | OUTPATIENT
Start: 2017-09-06 | End: 2017-10-10 | Stop reason: SDUPTHER

## 2017-09-06 ASSESSMENT — PAIN SCALES - GENERAL: PAINLEVEL: 5=MODERATE PAIN

## 2017-09-07 NOTE — PROGRESS NOTES
Subjective:     Chief Complaint   Patient presents with   • Follow-Up     back pain   • Tingling     numbness,bilat. foot x 3 weeks       Tracy, a 37 y.o female presents alone with a list of concerns today.    Binge Eating: she has a known nocturnal binge eating problem. She would like her Topamax increased as it works for a while and she is feeling full, but feels it has been wearing off.  She is eating her favorite foods first now because she may feel too full to finish them later. She reports she is tolerating the Topamax well and denies any side effects. She has not yet began therapy for this issue.    She has chronic low back pain and states PT did not call her back and would like to pursue her eight sessions that were discussed when she did the initial evaluation with them. She continues to report that her pain is bothersome.    She states she has made a plan to stop smoking tomorrow, her parents will be smoking outside.  She does not use the Nicorette as it stains her teeth. She recently was seen by pulmonology and was given a diagnosis of obesity hypoventilation syndrome. She was not felt to have COPD. She does have a sleep study scheduled for October as sleep apnea is suspected. She continues report that she snores very loudly and does not feel rested during the day.    She states her hemorrhoids are bothering her.  Her stools are hard.  She drinks water all day long. She continues on her low cholesterol diet. No complaints of abdominal pain or bloating. No blood in her stools. She hasn't tried anything for constipation. She is quite sedentary.        Current medicines (including changes today)  Current Outpatient Prescriptions   Medication Sig Dispense Refill   • topiramate (TOPAMAX) 100 MG Tab Take 1 Tab by mouth 2 times a day. 60 Tab 3   • polyethylene glycol 3350 (MIRALAX) Powder Take 17 g by mouth every day. 1 Bottle 3   • methocarbamol (ROBAXIN) 500 MG Tab TAKE ONE TABLET BY MOUTH TWICE DAILY AS  NEEDED FOR  MUSCLE  TENSION  AND  PAIN 30 Tab 3   • albuterol (PROVENTIL) 2.5mg/3ml Nebu Soln solution for nebulization USE ONE VIAL IN NEBULIZER EVERY 4 HOURS AS NEEDED FOR SHORTNESS OF BREATH 360 mL 3   • VENTOLIN  (90 BASE) MCG/ACT Aero Soln inhalation aerosol INHALE TWO PUFFS BY MOUTH EVERY 6 HOURS AS NEEDED FOR SHORTNESS OF BREATH 1 Inhaler 3   • furosemide (LASIX) 20 MG Tab TAKE ONE TABLET BY MOUTH ONCE DAILY 30 Tab 3   • atorvastatin (LIPITOR) 40 MG Tab TAKE ONE TABLET BY MOUTH ONCE DAILY IN THE EVENING  *INCREASED DOSE* 30 Tab 3   • NON SPECIFIED Bilateral knee high compression stockings for leg swelling. 2 Each 0   • clonazepam (KLONOPIN) 0.5 MG Tab      • ibuprofen (MOTRIN) 800 MG Tab Take 0.5 Tabs by mouth every 8 hours as needed. 30 Tab 3   • ranitidine (ZANTAC) 150 MG Tab TAKE ONE TABLET BY MOUTH TWICE DAILY WITH MEALS 60 Tab 3   • gabapentin (NEURONTIN) 100 MG Cap      • prazosin (MINIPRESS) 5 MG Cap      • zolpidem (AMBIEN) 5 MG Tab      • triamcinolone (NASACORT) 55 MCG/ACT nasal inhaler Spray 2 Sprays in nose every day. 1 Bottle 3   • citalopram (CELEXA) 40 MG Tab      • mirtazapine (REMERON) 15 MG Tab      • hydrOXYzine (ATARAX) 25 MG Tab Take 25 mg by mouth 2 Times a Day.     • nicotine polacrilex (NICORETTE) 4 MG gum Take 4 mg by mouth every 2 hours as needed. 270 Each 3     No current facility-administered medications for this visit.      She  has a past medical history of Anesthesia; Anxiety; Asthma; Binge eating (7/6/2017); Bipolar disorder (CMS-HCC); Breath shortness; COPD (chronic obstructive pulmonary disease) (CMS-HCC) (2/17/2015); Cricopharyngeal achalasia (3/12/2015); Depression; ETOH abuse; Heart burn; History of substance abuse (2/17/2015); Hyperlipidemia (2/17/2015); Hyperlipidemia (2/17/2015); Indigestion; Obesity hypoventilation syndrome (CMS-HCC) (9/6/2017); Psychiatric disorder; and Snoring. She also has no past medical history of Diabetes or Hypertension.      Current  "medications, allergies and problems list reviewed and updated in [facility].        ROS   Review of systems as documented above in history of present illness.         Objective:     Blood pressure 104/70, pulse 80, temperature 36.1 °C (97 °F), resp. rate 20, height 1.753 m (5' 9.02\"), weight (!) 134.3 kg (296 lb), last menstrual period 08/07/2017, SpO2 93 %, not currently breastfeeding. Body mass index is 43.69 kg/m².     Physical Exam:  Alert, oriented in no acute distress.  Eye contact is good, speech goal directed, affect calm  HEENT: conjunctiva non-injected, sclera non-icteric.  Neck: Supple. Thick.  Lungs: clear to auscultation bilaterally with good excursion,  diminished breath sounds.  CV: regular rate and rhythm.  Abdomen: soft, obese.  Ext: no edema  Skin: no rashes or lesions in visible areas.  MSK: Normal gait.       Assessment and Plan:   The following treatment plan was discussed     1. Obesity hypoventilation syndrome (CMS-HCC)  Pulmonary D/C’d Spiriva  Uses spaquclpj9h/3 weeks  Albuterol 4x/day    Sleep study is scheduled for October 20    Weight loss encouraged.       2. Binge eating disorder  She is tolerating the Topamax well but desires a greater response, increase Topamax to 100mg BID.  Again she was encouraged to get established with a counselor that specializes in eating disorders.    topiramate (TOPAMAX) 100 MG Tab   3. Acute midline low back pain without sciatica  Will place new referral to PT. she was reminded that on the last referral there is notation that they attempted to contact her without success. She is to call them if she is not contacted within the next couple of days.  Continue with Aleve when necessary.  Weight loss again encouraged.  REFERRAL TO PHYSICAL THERAPY Reason for Therapy: Eval/Treat/Report   4. Constipation, unspecified constipation type  Continue on low cholesterol diet. Increase fruits, vegetables and fiber.   Start daily Miralax.  Increase activity " levels.    polyethylene glycol 3350 (MIRALAX) Powder   5. Need for vaccination  Flu Quad Inj >3 Year Pre-Filled PF  I have placed the above orders and discussed them with an approved delegating provider. The MA is performing the above orders under the direction of Dr. Srinivasan.         Followup: Return in about 6 weeks (around 10/18/2017), or if symptoms worsen or fail to improve.          Please note that this dictation was created using voice recognition software. Every reasonable attempt has been made to correct obvious errors, however there may be errors of grammar and possibly content that were not discovered before finalizing the note.

## 2017-09-19 RX ORDER — RANITIDINE 150 MG/1
TABLET ORAL
Qty: 60 TAB | Refills: 3 | Status: SHIPPED | OUTPATIENT
Start: 2017-09-19 | End: 2018-02-20 | Stop reason: SDUPTHER

## 2017-10-05 PROCEDURE — 99283 EMERGENCY DEPT VISIT LOW MDM: CPT

## 2017-10-06 ENCOUNTER — HOSPITAL ENCOUNTER (EMERGENCY)
Facility: MEDICAL CENTER | Age: 37
End: 2017-10-06
Attending: EMERGENCY MEDICINE
Payer: MEDICAID

## 2017-10-06 VITALS
OXYGEN SATURATION: 95 % | HEART RATE: 90 BPM | RESPIRATION RATE: 19 BRPM | HEIGHT: 66 IN | WEIGHT: 293 LBS | SYSTOLIC BLOOD PRESSURE: 108 MMHG | BODY MASS INDEX: 47.09 KG/M2 | DIASTOLIC BLOOD PRESSURE: 68 MMHG | TEMPERATURE: 97.5 F

## 2017-10-06 DIAGNOSIS — M54.50 CHRONIC BILATERAL LOW BACK PAIN WITHOUT SCIATICA: ICD-10-CM

## 2017-10-06 DIAGNOSIS — G89.29 CHRONIC BILATERAL LOW BACK PAIN WITHOUT SCIATICA: ICD-10-CM

## 2017-10-06 PROCEDURE — 700102 HCHG RX REV CODE 250 W/ 637 OVERRIDE(OP): Performed by: EMERGENCY MEDICINE

## 2017-10-06 PROCEDURE — A9270 NON-COVERED ITEM OR SERVICE: HCPCS | Performed by: EMERGENCY MEDICINE

## 2017-10-06 RX ORDER — KETOROLAC TROMETHAMINE 10 MG/1
10 TABLET, FILM COATED ORAL EVERY 6 HOURS PRN
Qty: 22 TAB | Refills: 0 | Status: SHIPPED | OUTPATIENT
Start: 2017-10-06 | End: 2017-11-30

## 2017-10-06 RX ORDER — OXYCODONE AND ACETAMINOPHEN 10; 325 MG/1; MG/1
1 TABLET ORAL ONCE
Status: COMPLETED | OUTPATIENT
Start: 2017-10-06 | End: 2017-10-06

## 2017-10-06 RX ADMIN — OXYCODONE HYDROCHLORIDE AND ACETAMINOPHEN 1 TABLET: 10; 325 TABLET ORAL at 05:14

## 2017-10-06 NOTE — ED NOTES
Pt sleeping comfortably in bed. Monitors in place VSS. No s/s of distress noted. Will continue to monitor.

## 2017-10-06 NOTE — ED NOTES
Pt in gown in room, pt states that her chronic back pain, continued to get worse today, and she decided to come in.

## 2017-10-06 NOTE — DISCHARGE INSTRUCTIONS
Return if fever, vomiting or if no better in 12 hours.Back Exercises  Back exercises help treat and prevent back injuries. The goal of back exercises is to increase the strength of your abdominal and back muscles and the flexibility of your back. These exercises should be started when you no longer have back pain. Back exercises include:  · Pelvic Tilt. Lie on your back with your knees bent. Tilt your pelvis until the lower part of your back is against the floor. Hold this position 5 to 10 sec and repeat 5 to 10 times.  · Knee to Chest. Pull first 1 knee up against your chest and hold for 20 to 30 seconds, repeat this with the other knee, and then both knees. This may be done with the other leg straight or bent, whichever feels better.  · Sit-Ups or Curl-Ups. Bend your knees 90 degrees. Start with tilting your pelvis, and do a partial, slow sit-up, lifting your trunk only 30 to 45 degrees off the floor. Take at least 2 to 3 seconds for each sit-up. Do not do sit-ups with your knees out straight. If partial sit-ups are difficult, simply do the above but with only tightening your abdominal muscles and holding it as directed.  · Hip-Lift. Lie on your back with your knees flexed 90 degrees. Push down with your feet and shoulders as you raise your hips a couple inches off the floor; hold for 10 seconds, repeat 5 to 10 times.  · Back arches. Lie on your stomach, propping yourself up on bent elbows. Slowly press on your hands, causing an arch in your low back. Repeat 3 to 5 times. Any initial stiffness and discomfort should lessen with repetition over time.  · Shoulder-Lifts. Lie face down with arms beside your body. Keep hips and torso pressed to floor as you slowly lift your head and shoulders off the floor.  Do not overdo your exercises, especially in the beginning. Exercises may cause you some mild back discomfort which lasts for a few minutes; however, if the pain is more severe, or lasts for more than 15 minutes, do  not continue exercises until you see your caregiver. Improvement with exercise therapy for back problems is slow.   See your caregivers for assistance with developing a proper back exercise program.     This information is not intended to replace advice given to you by your health care provider. Make sure you discuss any questions you have with your health care provider.     Document Released: 01/25/2006 Document Revised: 03/11/2013 Document Reviewed: 02/11/2016  [employer] Interactive Patient Education ©2016 [employer]    Back Injury Prevention  Back injuries can be very painful. They can also be difficult to heal. After having one back injury, you are more likely to injure your back again. It is important to learn how to avoid injuring or re-injuring your back. The following tips can help you to prevent a back injury.  WHAT SHOULD I KNOW ABOUT PHYSICAL FITNESS?  · Exercise for 30 minutes per day on most days of the week or as directed by your health care provider. Make sure to:  ¨ Do aerobic exercises, such as walking, jogging, biking, or swimming.  ¨ Do exercises that increase balance and strength, such as emmanuel chi and yoga. These can decrease your risk of falling and injuring your back.  ¨ Do stretching exercises to help with flexibility.  ¨ Try to develop strong abdominal muscles. Your abdominal muscles provide a lot of the support that is needed by your back.  · Maintain a healthy weight.  This helps to decrease your risk of a back injury.  WHAT SHOULD I KNOW ABOUT MY DIET?  · Talk with your health care provider about your overall diet. Take supplements and vitamins only as directed by your health care provider.  · Talk with your health care provider about how much calcium and vitamin D you need each day. These nutrients help to prevent weakening of the bones (osteoporosis). Osteoporosis can cause broken (fractured) bones, which lead to back pain.  · Include good sources of calcium in your diet, such as dairy  "products, green leafy vegetables, and products that have had calcium added to them (fortified).  · Include good sources of vitamin D in your diet, such as milk and foods that are fortified with vitamin D.  WHAT SHOULD I KNOW ABOUT MY POSTURE?  · Sit up straight and stand up straight. Avoid leaning forward when you sit or hunching over when you stand.  · Choose chairs that have good low-back (lumbar) support.  · If you work at a desk, sit close to it so you do not need to lean over. Keep your chin tucked in. Keep your neck drawn back, and keep your elbows bent at a right angle. Your arms should look like the letter \"L.\"  · Sit high and close to the steering wheel when you drive. Add a lumbar support to your car seat, if needed.  · Avoid sitting or standing in one position for very long. Take breaks to get up, stretch, and walk around at least one time every hour. Take breaks every hour if you are driving for long periods of time.  · Sleep on your side with your knees slightly bent, or sleep on your back with a pillow under your knees. Do not lie on the front of your body to sleep.  WHAT SHOULD I KNOW ABOUT LIFTING, TWISTING, AND REACHING?  Lifting and Heavy Lifting  · Avoid heavy lifting, especially repetitive heavy lifting. If you must do heavy lifting:  ¨ Stretch before lifting.  ¨ Work slowly.  ¨ Rest between lifts.  ¨ Use a tool such as a cart or a pau to move objects if one is available.  ¨ Make several small trips instead of carrying one heavy load.  ¨ Ask for help when you need it, especially when moving big objects.  · Follow these steps when lifting:  ¨ Stand with your feet shoulder-width apart.  ¨ Get as close to the object as you can. Do not try to  a heavy object that is far from your body.  ¨ Use handles or lifting straps if they are available.  ¨ Bend at your knees. Squat down, but keep your heels off the floor.  ¨ Keep your shoulders pulled back, your chin tucked in, and your back " straight.  ¨ Lift the object slowly while you tighten the muscles in your legs, abdomen, and buttocks. Keep the object as close to the center of your body as possible.  · Follow these steps when putting down a heavy load:  ¨ Stand with your feet shoulder-width apart.  ¨ Lower the object slowly while you tighten the muscles in your legs, abdomen, and buttocks. Keep the object as close to the center of your body as possible.  ¨ Keep your shoulders pulled back, your chin tucked in, and your back straight.  ¨ Bend at your knees. Squat down, but keep your heels off the floor.  ¨ Use handles or lifting straps if they are available.  Twisting and Reaching  · Avoid lifting heavy objects above your waist.  · Do not twist at your waist while you are lifting or carrying a load. If you need to turn, move your feet.  · Do not bend over without bending at your knees.  · Avoid reaching over your head, across a table, or for an object on a high surface.  WHAT ARE SOME OTHER TIPS?  · Avoid wet floors and icy ground. Keep sidewalks clear of ice to prevent falls.  · Do not sleep on a mattress that is too soft or too hard.  · Keep items that are used frequently within easy reach.  · Put heavier objects on shelves at waist level, and put lighter objects on lower or higher shelves.  · Find ways to decrease your stress, such as exercise, massage, or relaxation techniques. Stress can build up in your muscles. Tense muscles are more vulnerable to injury.  · Talk with your health care provider if you feel anxious or depressed. These conditions can make back pain worse.  · Wear flat heel shoes with cushioned soles.  · Avoid sudden movements.  · Use both shoulder straps when carrying a backpack.  · Do not use any tobacco products, including cigarettes, chewing tobacco, or electronic cigarettes. If you need help quitting, ask your health care provider.     This information is not intended to replace advice given to you by your health care  provider. Make sure you discuss any questions you have with your health care provider.     Document Released: 01/25/2006 Document Revised: 05/03/2016 Document Reviewed: 12/22/2015  [employer] Interactive Patient Education ©2016 [employer]      Back Pain, Adult  Back pain is very common in adults. The cause of back pain is rarely dangerous and the pain often gets better over time. The cause of your back pain may not be known. Some common causes of back pain include:  · Strain of the muscles or ligaments supporting the spine.  · Wear and tear (degeneration) of the spinal disks.  · Arthritis.  · Direct injury to the back.  For many people, back pain may return. Since back pain is rarely dangerous, most people can learn to manage this condition on their own.  HOME CARE INSTRUCTIONS  Watch your back pain for any changes. The following actions may help to lessen any discomfort you are feeling:  · Remain active. It is stressful on your back to sit or  one place for long periods of time. Do not sit, drive, or  one place for more than 30 minutes at a time. Take short walks on even surfaces as soon as you are able. Try to increase the length of time you walk each day.  · Exercise regularly as directed by your health care provider. Exercise helps your back heal faster. It also helps avoid future injury by keeping your muscles strong and flexible.  · Do not stay in bed. Resting more than 1-2 days can delay your recovery.  · Pay attention to your body when you bend and lift. The most comfortable positions are those that put less stress on your recovering back. Always use proper lifting techniques, including:  ¨ Bending your knees.  ¨ Keeping the load close to your body.  ¨ Avoiding twisting.  · Find a comfortable position to sleep. Use a firm mattress and lie on your side with your knees slightly bent. If you lie on your back, put a pillow under your knees.  · Avoid feeling anxious or stressed. Stress increases  muscle tension and can worsen back pain. It is important to recognize when you are anxious or stressed and learn ways to manage it, such as with exercise.  · Take medicines only as directed by your health care provider. Over-the-counter medicines to reduce pain and inflammation are often the most helpful. Your health care provider may prescribe muscle relaxant drugs. These medicines help dull your pain so you can more quickly return to your normal activities and healthy exercise.  · Apply ice to the injured area:  ¨ Put ice in a plastic bag.  ¨ Place a towel between your skin and the bag.  ¨ Leave the ice on for 20 minutes, 2-3 times a day for the first 2-3 days. After that, ice and heat may be alternated to reduce pain and spasms.  · Maintain a healthy weight. Excess weight puts extra stress on your back and makes it difficult to maintain good posture.  SEEK MEDICAL CARE IF:  · You have pain that is not relieved with rest or medicine.  · You have increasing pain going down into the legs or buttocks.  · You have pain that does not improve in one week.  · You have night pain.  · You lose weight.  · You have a fever or chills.  SEEK IMMEDIATE MEDICAL CARE IF:   · You develop new bowel or bladder control problems.  · You have unusual weakness or numbness in your arms or legs.  · You develop nausea or vomiting.  · You develop abdominal pain.  · You feel faint.     This information is not intended to replace advice given to you by your health care provider. Make sure you discuss any questions you have with your health care provider.     Document Released: 12/18/2006 Document Revised: 01/08/2016 Document Reviewed: 04/21/2015  [employer] Interactive Patient Education ©2016 [employer]  Return if fever, vomiting or if no better in 12 hours.

## 2017-10-06 NOTE — ED NOTES
.  Chief Complaint   Patient presents with   • Back Pain     pt with chronic back pain states today became so severe that she could not move at home, pt is able to stand up from w/c and ambulate to stand up scale for weight then back to w/c     Pt took rosy back and body 3 tablets today without any relief of pain, unable to get into physical therapy due to not taking her insurance states been constipated lately.Pt Informed regarding triage process and verbalized understanding to inform triage tech or RN for any changes in condition.  Placed in lobby.

## 2017-10-06 NOTE — ED NOTES
Pt sleeping comfortably in bed, no distress noted. Call bell within reach, will continue to monitor.

## 2017-10-06 NOTE — ED PROVIDER NOTES
"ED Provider Note    CHIEF COMPLAINT  Chief Complaint   Patient presents with   • Back Pain     pt with chronic back pain states today became so severe that she could not move at home, pt is able to stand up from w/c and ambulate to stand up scale for weight then back to w/c       [facility]  Tracymary anne Calderon is a 37 y.o. female who presents with low back pain, for the last 6 months, worse today, no radiation of pain no fever no chills no IV drug use no bowel or bladder problems. P pain severe, worse when she moves, no abdominal pain.    REVIEW OF SYSTEMS  See HPI for further details. History of binge eating, elevated lipids and asthma heartburn alcohol abuse COPD    PAST MEDICAL HISTORY  Past Medical History:   Diagnosis Date   • Obesity hypoventilation syndrome (CMS-HCC) 9/6/2017   • Binge eating 7/6/2017   • Cricopharyngeal achalasia 3/12/2015   • COPD (chronic obstructive pulmonary disease) (CMS-HCC) 2/17/2015   • History of substance abuse 2/17/2015    ETOH-quit 2013, meth-quit 2007   • Hyperlipidemia 2/17/2015   • Hyperlipidemia 2/17/2015   • Anesthesia     panic attacks and needs Mom to be there when waking up   • Anxiety    • Asthma     will bring inhalers   • Bipolar disorder (CMS-HCC)    • Breath shortness    • Depression    • ETOH abuse    • Heart burn    • Indigestion    • Psychiatric disorder    • Snoring          SOCIAL HISTORY        CURRENT MEDICATIONS  Home Medications    **Home medications have not yet been reviewed for this encounter**         ALLERGIES  Allergies   Allergen Reactions   • Penicillins Hives   • Vicodin [Hydrocodone-Acetaminophen]      'WIRES ME, DO NOT LIKE HOW IT MAKES ME FEEL',HYPERACTIVITY       PHYSICAL EXAM  VITAL SIGNS: /64   Pulse 84   Temp 36.4 °C (97.5 °F)   Resp 18   Ht 1.676 m (5' 6\")   Wt (!) 135 kg (297 lb 9.9 oz)   SpO2 95%   BMI 48.04 kg/m²   Constitutional: Well developed, Well nourished,She is markedly obese No acute distress, Non-toxic appearance. "   HENT: Normocephalic, Atraumatic, Bilateral external ears normal, Oropharynx moist, No oral exudates, Nose normal.   Eyes: PERRLA, EOMI, Conjunctiva normal, No discharge.   Neck: Normal range of motion, No tenderness, Supple, No stridor.   Cardiovascular:  Heart sounds are normal no murmurs or rubs  Thorax & Lungs: Lungs are clear equal breath hands bilaterally no rhonchi rales or wheezes. Chest wall motion is nonlabored  Abdomen: Bowel sounds normal, Soft, No tenderness, No masses, No pulsatile masses.   Skin: Warm, Dry, No erythema, No rash.   Neurologic: Alert & oriented x 3, Normal motor function, Normal sensory function, No focal deficits noted.     Examination of the back reveals no tenderness. Straight leg raise is negative bilaterally. Deep tendon reflexes equal bilaterally. Toe and heel flexion and extension are normal. Motor sensory exam of the lower legs is normal    COURSE & MEDICAL DECISION MAKING  Pertinent Labs & Imaging studies reviewed. (See chart for details)    She's been having back pain for 6 months, acute exacerbation. Given Toradol for pain. And I will have her follow-up with the [facility]. He had given Toradol and a Percocet here.  FINAL IMPRESSION  1.  1. Chronic bilateral low back pain without sciatica        2.   3.    Disposition:  Discharge instructions are understood. This patient is to return if fever vomiting or no better in 12 hours. Follow up with the [facility] clinic or private physician. Information sheets onChronic low back pain    Electronically signed by: Gordy Javed, 10/6/2017 4:56 AM

## 2017-10-10 ENCOUNTER — OFFICE VISIT (OUTPATIENT)
Dept: MEDICAL GROUP | Facility: MEDICAL CENTER | Age: 37
End: 2017-10-10
Attending: NURSE PRACTITIONER
Payer: MEDICAID

## 2017-10-10 VITALS
SYSTOLIC BLOOD PRESSURE: 106 MMHG | DIASTOLIC BLOOD PRESSURE: 74 MMHG | RESPIRATION RATE: 20 BRPM | WEIGHT: 293 LBS | BODY MASS INDEX: 43.4 KG/M2 | HEART RATE: 88 BPM | OXYGEN SATURATION: 94 % | HEIGHT: 69 IN | TEMPERATURE: 97 F

## 2017-10-10 DIAGNOSIS — G89.29 CHRONIC MIDLINE LOW BACK PAIN WITHOUT SCIATICA: ICD-10-CM

## 2017-10-10 DIAGNOSIS — F50.81 BINGE EATING DISORDER: ICD-10-CM

## 2017-10-10 DIAGNOSIS — M54.50 CHRONIC MIDLINE LOW BACK PAIN WITHOUT SCIATICA: ICD-10-CM

## 2017-10-10 DIAGNOSIS — E66.01 MORBID OBESITY WITH BMI OF 40.0-44.9, ADULT (HCC): ICD-10-CM

## 2017-10-10 PROCEDURE — 99214 OFFICE O/P EST MOD 30 MIN: CPT | Performed by: NURSE PRACTITIONER

## 2017-10-10 RX ORDER — TOPIRAMATE 100 MG/1
TABLET, FILM COATED ORAL
Qty: 60 TAB | Refills: 3 | Status: SHIPPED | OUTPATIENT
Start: 2017-10-10 | End: 2018-03-06

## 2017-10-10 RX ORDER — TOPIRAMATE 50 MG/1
TABLET, FILM COATED ORAL
Qty: 60 TAB | Refills: 3 | Status: SHIPPED | OUTPATIENT
Start: 2017-10-10 | End: 2018-03-06

## 2017-10-10 ASSESSMENT — PAIN SCALES - GENERAL: PAINLEVEL: 5=MODERATE PAIN

## 2017-10-10 NOTE — ASSESSMENT & PLAN NOTE
"She has chronic low back pain which she states has been worsening over the past several months. She states that her pain became really severe last Thursday and she presented to the ER because she states her low back was \"out.\" There was no associated injury. She was given some pain pills in the ER and was discharged with Toradol orally which she has been taking. Her pain has improved but is not resolved. She was referred to PT last month for her low back but there seems to be a problem with insurance authorizing the visits.   "

## 2017-10-11 NOTE — ASSESSMENT & PLAN NOTE
She presents today for follow-up for her known binge eating disorder. She continues on Topamax 100 mg twice daily which she states has been slightly helpful but she is hoping that a higher dose will further help control her cravings. She continues to report that her cravings are most severe at night and she often gets out of bed to eat in the middle of the night. She continues to see a therapist and states she does the best she can to help address her eating issues although she has not been able to find a therapist that specializes in eating disorders. She also continues under the care of her psychiatrist for her known bipolar disorder and states that her mood has been stable. Her medications are unchanged.

## 2017-10-11 NOTE — PROGRESS NOTES
"Subjective:     Chief Complaint   Patient presents with   • Follow-Up     Tracy Calderon is a 37 y.o. female here today for multiple problems as listed below    Chronic midline low back pain  She has chronic low back pain which she states has been worsening over the past several months. She states that her pain became really severe last Thursday and she presented to the ER because she states her low back was \"out.\" There was no associated injury. She was given some pain pills in the ER and was discharged with Toradol orally which she has been taking. Her pain has improved but is not resolved. She was referred to PT last month for her low back but there seems to be a problem with insurance authorizing the visits.     Binge eating disorder  She presents today for follow-up for her known binge eating disorder. She continues on Topamax 100 mg twice daily which she states has been slightly helpful but she is hoping that a higher dose will further help control her cravings. She continues to report that her cravings are most severe at night and she often gets out of bed to eat in the middle of the night. She continues to see a therapist and states she does the best she can to help address her eating issues although she has not been able to find a therapist that specializes in eating disorders. She also continues under the care of her psychiatrist for her known bipolar disorder and states that her mood has been stable. Her medications are unchanged.           Current medicines (including changes today)  Current Outpatient Prescriptions   Medication Sig Dispense Refill   • topiramate (TOPAMAX) 100 MG Tab Take 150 mg twice daily (100 mg + 50 mg tab) 60 Tab 3   • topiramate (TOPAMAX) 50 MG tablet Take 150 mg twice daily (100 mg + 50 mg tab) 60 Tab 3   • ketorolac (TORADOL) 10 MG Tab Take 1 Tab by mouth every 6 hours as needed for up to 22 doses. 22 Tab 0   • ranitidine (ZANTAC) 150 MG Tab TAKE ONE TABLET BY MOUTH " TWICE DAILY WITH MEALS 60 Tab 3   • polyethylene glycol 3350 (MIRALAX) Powder Take 17 g by mouth every day. 1 Bottle 3   • methocarbamol (ROBAXIN) 500 MG Tab TAKE ONE TABLET BY MOUTH TWICE DAILY AS NEEDED FOR  MUSCLE  TENSION  AND  PAIN 30 Tab 3   • albuterol (PROVENTIL) 2.5mg/3ml Nebu Soln solution for nebulization USE ONE VIAL IN NEBULIZER EVERY 4 HOURS AS NEEDED FOR SHORTNESS OF BREATH 360 mL 3   • VENTOLIN  (90 BASE) MCG/ACT Aero Soln inhalation aerosol INHALE TWO PUFFS BY MOUTH EVERY 6 HOURS AS NEEDED FOR SHORTNESS OF BREATH 1 Inhaler 3   • furosemide (LASIX) 20 MG Tab TAKE ONE TABLET BY MOUTH ONCE DAILY 30 Tab 3   • atorvastatin (LIPITOR) 40 MG Tab TAKE ONE TABLET BY MOUTH ONCE DAILY IN THE EVENING  *INCREASED DOSE* 30 Tab 3   • NON SPECIFIED Bilateral knee high compression stockings for leg swelling. 2 Each 0   • clonazepam (KLONOPIN) 0.5 MG Tab      • ibuprofen (MOTRIN) 800 MG Tab Take 0.5 Tabs by mouth every 8 hours as needed. 30 Tab 3   • gabapentin (NEURONTIN) 100 MG Cap      • prazosin (MINIPRESS) 5 MG Cap      • zolpidem (AMBIEN) 5 MG Tab      • triamcinolone (NASACORT) 55 MCG/ACT nasal inhaler Spray 2 Sprays in nose every day. 1 Bottle 3   • citalopram (CELEXA) 40 MG Tab      • mirtazapine (REMERON) 15 MG Tab      • hydrOXYzine (ATARAX) 25 MG Tab Take 25 mg by mouth 2 Times a Day.     • nicotine polacrilex (NICORETTE) 4 MG gum Take 4 mg by mouth every 2 hours as needed. 270 Each 3     No current facility-administered medications for this visit.      She  has a past medical history of Anesthesia; Anxiety; Asthma; Binge eating (7/6/2017); Bipolar disorder (CMS-HCC); Breath shortness; COPD (chronic obstructive pulmonary disease) (CMS-HCC) (2/17/2015); Cricopharyngeal achalasia (3/12/2015); Depression; ETOH abuse; Heart burn; History of substance abuse (2/17/2015); Hyperlipidemia (2/17/2015); Hyperlipidemia (2/17/2015); Indigestion; Obesity hypoventilation syndrome (CMS-HCC) (9/6/2017); Psychiatric  "disorder; and Snoring. She also has no past medical history of Diabetes or Hypertension.      Current medications, allergies and problems list reviewed and updated in EPIC.      ROS   Review of systems as documented above in history of present illness.         Objective:     Blood pressure 106/74, pulse 88, temperature 36.1 °C (97 °F), resp. rate 20, height 1.753 m (5' 9.02\"), weight (!) 133.4 kg (294 lb), last menstrual period 09/26/2017, SpO2 94 %, not currently breastfeeding. Body mass index is 43.39 kg/m².     Physical Exam:  Alert, oriented in no acute distress.  Eye contact is good, speech goal directed, affect anxious.  HEENT: conjunctiva non-injected, sclera non-icteric.  Oral mucous membranes pink and moist with no lesions.  Neck: Supple.  Lungs: clear to auscultation bilaterally with good excursion.  CV: regular rate and rhythm.  Abdomen: soft  Ext: no edema  Skin: no rashes or lesions in visible areas.  MSK: Normal gait. No bony point tenderness, swelling or deformity noted in lumbar spine.      Assessment and Plan:   The following treatment plan was discussed     1. Chronic midline low back pain without sciatica  With recent acute exacerbation.  Continue with Toradol as prescribed by ER and with methocarbamol.  Encouraged/educated on supportive measures including use of NSAIDs, muscle relaxants, application of heat/ice, gentle stretching, exercise and massage.  She is to call PT today to FU on authorization for visits.  She was provided with some lumbar spine stabilization exercises to perform at home as well.    Weight management strongly encouraged.     2. Binge eating disorder  Not controlled.  Increase Topamax to 150 mg BID.  Continue to work with therapist and psychiatrist.  Avoid nighttime eating as much as possible.    topiramate (TOPAMAX) 100 MG Tab    topiramate (TOPAMAX) 50 MG tablet   3. Morbid obesity with BMI of 40.0-44.9, adult (CMS-HCC)  As above, weight loss encouraged.         She was " informed that I will be leaving the clinic at the end of the month. We discussed options for alternate PCP is within our clinic. She states that she may try to change her insurance and begin seeing her old PCP again. She was told that she can follow-up here at any time and I suggested she come back in 6 weeks unless she get established with her old PCP to see how she is doing on the increased dose of the Topamax.      Followup: Return in about 6 weeks (around 11/21/2017), or if symptoms worsen or fail to improve.          Please note that this dictation was created using voice recognition software. Every reasonable attempt has been made to correct obvious errors, however there may be errors of grammar and possibly content that were not discovered before finalizing the note.

## 2017-10-17 RX ORDER — ATORVASTATIN CALCIUM 40 MG/1
TABLET, FILM COATED ORAL
Qty: 30 TAB | Refills: 3 | Status: SHIPPED | OUTPATIENT
Start: 2017-10-17 | End: 2018-02-23 | Stop reason: SDUPTHER

## 2017-11-16 RX ORDER — FUROSEMIDE 20 MG/1
TABLET ORAL
Qty: 30 TAB | Refills: 3 | Status: SHIPPED | OUTPATIENT
Start: 2017-11-16 | End: 2018-03-23 | Stop reason: SDUPTHER

## 2017-11-30 ENCOUNTER — OFFICE VISIT (OUTPATIENT)
Dept: MEDICAL GROUP | Facility: MEDICAL CENTER | Age: 37
End: 2017-11-30
Attending: FAMILY MEDICINE
Payer: MEDICAID

## 2017-11-30 VITALS
TEMPERATURE: 97.4 F | OXYGEN SATURATION: 93 % | BODY MASS INDEX: 43.1 KG/M2 | WEIGHT: 291 LBS | DIASTOLIC BLOOD PRESSURE: 70 MMHG | HEIGHT: 69 IN | RESPIRATION RATE: 18 BRPM | SYSTOLIC BLOOD PRESSURE: 116 MMHG | HEART RATE: 100 BPM

## 2017-11-30 DIAGNOSIS — J45.909 MODERATE ASTHMA, UNSPECIFIED WHETHER COMPLICATED, UNSPECIFIED WHETHER PERSISTENT: ICD-10-CM

## 2017-11-30 DIAGNOSIS — G47.30 SLEEP APNEA, UNSPECIFIED TYPE: ICD-10-CM

## 2017-11-30 DIAGNOSIS — R74.8 ELEVATED VITAMIN B12 LEVEL: ICD-10-CM

## 2017-11-30 DIAGNOSIS — R79.89 LOW VITAMIN D LEVEL: ICD-10-CM

## 2017-11-30 DIAGNOSIS — E66.01 MORBID OBESITY WITH BMI OF 45.0-49.9, ADULT (HCC): ICD-10-CM

## 2017-11-30 PROCEDURE — 99214 OFFICE O/P EST MOD 30 MIN: CPT | Performed by: FAMILY MEDICINE

## 2017-11-30 PROCEDURE — 99213 OFFICE O/P EST LOW 20 MIN: CPT | Performed by: FAMILY MEDICINE

## 2017-12-01 NOTE — PROGRESS NOTES
CC: *Establish care    HPI: New to me  Tracy presents today  to establish [facility], 37 years old female with past medical history significant for asthma, obesity, in spite of her establishing care visit. Reviewed with the patient her past medical problems, past surgical history, family/social history and medications and discussed the following concerns. Follow today:        Morbid obesity with BMI of 45.0-49.9, adult (CMS-HCC)  Patient is morbidly obese, she said she has been trying to lose weight with healthy lifestyle modifications. Calorie count and exercise with out any success, has been started on Topamax by her previous PCP and gray-black to help with her obesity. Did not discuss or show interest in weight loss surgery at this time.  Sleep apnea, unspecified type   Patient reports that at night she feels that she stops breathing and she wakes up hungry for air, would like her sleep apnea test to be reordered for her to be able to rule out sleep apnea. Reports tiredness and fatigue during the day.     Moderate asthma, unspecified whether complicated, unspecified whether persistent   History of asthma, patient is a heavy smoker, she reports worsening of her asthma and would like to rule out COPD. Not ready to quit at this time.    Hist low vit d    History of low vitamin D level. Reports fatigue and tiredness  Hist elevated b12 level     Reviewed lab work results. There is evidence of elevated vitamin B 12. Patient states she continues to take over-the-counter prescription of multivitamin and B-12 supplementation  Patient Active Problem List    Diagnosis Date Noted   • Morbid obesity with BMI of 45.0-49.9, adult ([facility]) 11/30/2017   • Chronic midline low back pain 10/10/2017   • Obesity hypoventilation syndrome (CMS-HCC) 09/06/2017   • Binge eating disorder 07/06/2017   • Morbid obesity with BMI of 40.0-44.9, adult (CMS-HCC) 03/07/2017   • Nocturnal sleep-related eating disorder 08/18/2015   • Bilateral edema of lower  extremity 08/10/2015   • S/P nasal septoplasty 06/16/2015   • Anxiety 02/17/2015   • History of substance abuse 02/17/2015   • Hyperlipidemia 02/17/2015   • GERD (gastroesophageal reflux disease) 07/18/2013   • Tobacco abuse 07/18/2013   • Chest wall pain 07/18/2013       Current Outpatient Prescriptions   Medication Sig Dispense Refill   • furosemide (LASIX) 20 MG Tab TAKE ONE TABLET BY MOUTH ONCE DAILY 30 Tab 3   • atorvastatin (LIPITOR) 40 MG Tab TAKE ONE TABLET BY MOUTH ONCE DAILY IN THE EVENING  *INCREASED DOSE* 30 Tab 3   • topiramate (TOPAMAX) 100 MG Tab Take 150 mg twice daily (100 mg + 50 mg tab) 60 Tab 3   • topiramate (TOPAMAX) 50 MG tablet Take 150 mg twice daily (100 mg + 50 mg tab) 60 Tab 3   • ranitidine (ZANTAC) 150 MG Tab TAKE ONE TABLET BY MOUTH TWICE DAILY WITH MEALS 60 Tab 3   • albuterol (PROVENTIL) 2.5mg/3ml Nebu Soln solution for nebulization USE ONE VIAL IN NEBULIZER EVERY 4 HOURS AS NEEDED FOR SHORTNESS OF BREATH 360 mL 3   • VENTOLIN  (90 BASE) MCG/ACT Aero Soln inhalation aerosol INHALE TWO PUFFS BY MOUTH EVERY 6 HOURS AS NEEDED FOR SHORTNESS OF BREATH 1 Inhaler 3   • NON SPECIFIED Bilateral knee high compression stockings for leg swelling. 2 Each 0   • clonazepam (KLONOPIN) 0.5 MG Tab      • prazosin (MINIPRESS) 5 MG Cap      • mirtazapine (REMERON) 15 MG Tab        No current facility-administered medications for this visit.          Allergies as of 11/30/2017 - Reviewed 11/30/2017   Allergen Reaction Noted   • Penicillins Hives 08/20/2007   • Vicodin [hydrocodone-acetaminophen]  04/24/2015        ROS: Denies any chest pain, Shortness of breath, Changes bowel or bladder, Lower extremity edema.    Physical Exam:  Gen.: Well-developed, well-nourished,Obese patient, no apparent distress,pleasant and cooperative with the examination  Skin:  Warm and dry with good turgor. No rashes or suspicious lesions in visible areas  Eye: PERRLA, conjunctiva and sclera clear, lids normal  HEENT:  Normocephalic/atraumatic, sinuses nontender with palpation, TMs clear, nares patent with pink mucosa and clear rhinorrhea, lips without lesions, oropharynx clear.  Neck: Trachea midline,no masses or adenopathy  Thyroid: normal consistency and size. No masses or nodules. Not tender with palpation.  Cor: Regular rate and rhythm without murmur, gallop or rub.  Lungs: Respirations unlabored.Clear to auscultation with equal breath sounds bilaterally. No wheezes, rhonchi.  Abdomen: Soft nontender without hepatosplenomegaly or masses appreciated, normoactive bowel sounds. No hernias.  Extremities: No cyanosis, clubbing or edema, Symmetrical without deformities or malformations. Pulses 2+ and symmetrical both upper and lower extremities  Lymphatic: No abnormal adenopathy of the neck groin or axillae.  Psych: Alert and oriented x 3.Normal affect, judgement,insight and memory.        Assessment and Plan.   37 y.o. female, establish care    1. Morbid obesity with BMI of 45.0-49.9, adult (CMS-HCC)  Continue Topamax, continue healthy lifestyle modifications, diet control, exercise, rule out sleep apnea  - Patient identified as having weight management issue.  Appropriate orders and counseling given.  - COMP METABOLIC PANEL; Future  - TSH; Future  - FREE THYROXINE; Future  - LIPID PROFILE; Future  - HEMOGLOBIN A1C; Future    2. Sleep apnea, unspecified type  Rule out sleep apnea, morbidly obese patient with history of stopping breathing during sleep  - REFERRAL TO SLEEP STUDIES    3. Moderate asthma, unspecified whether complicated, unspecified whether persistent  Uncontrolled asthma on albuterol inhaler, do pulmonary function test to rule out COPD, counseled strongly on smoking cessation  - PULMONARY FUNCTION TESTS Test requested: Complete Pulmonary Function Test    4. Elevated vitamin B12 level  Recheck B-12 level  - VITAMIN B12; Future    5. Low vitamin D level  *Recheck vitamin D level.  - VITAMIN 1,25 DIHYDROXY;  Future      Please note that this dictation was created using voice recognition software. I have made every reasonable attempt to correct obvious errors but there may be errors of grammar and content that I may have overlooked prior to finalization of this note.

## 2017-12-21 ENCOUNTER — HOSPITAL ENCOUNTER (OUTPATIENT)
Dept: LAB | Facility: MEDICAL CENTER | Age: 37
End: 2017-12-21
Attending: FAMILY MEDICINE
Payer: MEDICAID

## 2017-12-21 DIAGNOSIS — E66.01 MORBID OBESITY WITH BMI OF 45.0-49.9, ADULT (HCC): ICD-10-CM

## 2017-12-21 DIAGNOSIS — R79.89 LOW VITAMIN D LEVEL: ICD-10-CM

## 2017-12-21 DIAGNOSIS — R74.8 ELEVATED VITAMIN B12 LEVEL: ICD-10-CM

## 2017-12-21 LAB
ALBUMIN SERPL BCP-MCNC: 4.1 G/DL (ref 3.2–4.9)
ALBUMIN/GLOB SERPL: 1.3 G/DL
ALP SERPL-CCNC: 86 U/L (ref 30–99)
ALT SERPL-CCNC: 34 U/L (ref 2–50)
ANION GAP SERPL CALC-SCNC: 5 MMOL/L (ref 0–11.9)
AST SERPL-CCNC: 30 U/L (ref 12–45)
BILIRUB SERPL-MCNC: 0.3 MG/DL (ref 0.1–1.5)
BUN SERPL-MCNC: 22 MG/DL (ref 8–22)
CALCIUM SERPL-MCNC: 9.3 MG/DL (ref 8.5–10.5)
CHLORIDE SERPL-SCNC: 112 MMOL/L (ref 96–112)
CHOLEST SERPL-MCNC: 190 MG/DL (ref 100–199)
CO2 SERPL-SCNC: 24 MMOL/L (ref 20–33)
CREAT SERPL-MCNC: 0.92 MG/DL (ref 0.5–1.4)
EST. AVERAGE GLUCOSE BLD GHB EST-MCNC: 123 MG/DL
GFR SERPL CREATININE-BSD FRML MDRD: >60 ML/MIN/1.73 M 2
GLOBULIN SER CALC-MCNC: 3.1 G/DL (ref 1.9–3.5)
GLUCOSE SERPL-MCNC: 96 MG/DL (ref 65–99)
HBA1C MFR BLD: 5.9 % (ref 0–5.6)
HDLC SERPL-MCNC: 43 MG/DL
LDLC SERPL CALC-MCNC: 131 MG/DL
POTASSIUM SERPL-SCNC: 4.3 MMOL/L (ref 3.6–5.5)
PROT SERPL-MCNC: 7.2 G/DL (ref 6–8.2)
SODIUM SERPL-SCNC: 141 MMOL/L (ref 135–145)
T4 FREE SERPL-MCNC: 0.73 NG/DL (ref 0.53–1.43)
TRIGL SERPL-MCNC: 82 MG/DL (ref 0–149)
TSH SERPL DL<=0.005 MIU/L-ACNC: 1.02 UIU/ML (ref 0.38–5.33)
VIT B12 SERPL-MCNC: 582 PG/ML (ref 211–911)

## 2017-12-21 PROCEDURE — 80053 COMPREHEN METABOLIC PANEL: CPT

## 2017-12-21 PROCEDURE — 36415 COLL VENOUS BLD VENIPUNCTURE: CPT

## 2017-12-21 PROCEDURE — 82652 VIT D 1 25-DIHYDROXY: CPT

## 2017-12-21 PROCEDURE — 83036 HEMOGLOBIN GLYCOSYLATED A1C: CPT

## 2017-12-21 PROCEDURE — 82607 VITAMIN B-12: CPT

## 2017-12-21 PROCEDURE — 84439 ASSAY OF FREE THYROXINE: CPT

## 2017-12-21 PROCEDURE — 84443 ASSAY THYROID STIM HORMONE: CPT

## 2017-12-21 PROCEDURE — 80061 LIPID PANEL: CPT

## 2017-12-23 LAB — 1,25(OH)2D3 SERPL-MCNC: 51.7 PG/ML (ref 19.9–79.3)

## 2018-01-02 ENCOUNTER — OFFICE VISIT (OUTPATIENT)
Dept: MEDICAL GROUP | Facility: MEDICAL CENTER | Age: 38
End: 2018-01-02
Attending: FAMILY MEDICINE
Payer: MEDICAID

## 2018-01-02 VITALS
SYSTOLIC BLOOD PRESSURE: 100 MMHG | HEIGHT: 69 IN | DIASTOLIC BLOOD PRESSURE: 62 MMHG | HEART RATE: 100 BPM | BODY MASS INDEX: 42.36 KG/M2 | OXYGEN SATURATION: 92 % | WEIGHT: 286 LBS | TEMPERATURE: 97.9 F | RESPIRATION RATE: 16 BRPM

## 2018-01-02 DIAGNOSIS — E66.01 MORBID OBESITY WITH BMI OF 40.0-44.9, ADULT (HCC): ICD-10-CM

## 2018-01-02 DIAGNOSIS — R73.02 IMPAIRED GLUCOSE TOLERANCE: ICD-10-CM

## 2018-01-02 DIAGNOSIS — E78.49 OTHER HYPERLIPIDEMIA: ICD-10-CM

## 2018-01-02 PROCEDURE — 99213 OFFICE O/P EST LOW 20 MIN: CPT | Performed by: FAMILY MEDICINE

## 2018-01-02 PROCEDURE — 99212 OFFICE O/P EST SF 10 MIN: CPT | Performed by: FAMILY MEDICINE

## 2018-01-02 ASSESSMENT — PAIN SCALES - GENERAL: PAINLEVEL: 4=SLIGHT-MODERATE PAIN

## 2018-01-02 NOTE — PROGRESS NOTES
Chief Complaint:   Chief Complaint   Patient presents with   • Follow-Up       HPI:established patient  Tracy Calderon is a 37 y.o. female who presents for follow up on her lab work results today ,         Patient labs were reviewed  and discussed with patient today , her LFT , KFT , Thyroid function , and Vit D , and B12 Levels all WNL        Other hyperlipidemia  Patient with history of very high LDL levels more than 200, was started on atorvastatin couple of years ago, reviewed her lipid profile now 131, continues to take atorvastatin 40 mg daily along with diet control, normal total cholesterol level, and HDL of 43, calculated cardiac risk at around 3.5%.  No side effects from medications     Impaired glucose tolerance: A1c at 5.9    Morbid obesity with BMI of 40.0-44.9, adult (CMS-HCC)     Requesting referral for further evaluation by wet loss surgery, patient is having an appointment to rule out sleep apnea to do her sleep studies. Continues to use Topamax and do diet control and exercise. Still frustrated with her weight and unable to lose weight at this time.      Past medical history, family history, social history and medications reviewed and updated in the record. today  Current medications, problem list and allergies reviewed in [facility] today  Health maintenance topics are reviewed and updated. today    Patient Active Problem List    Diagnosis Date Noted   • Morbid obesity with BMI of 45.0-49.9, adult ([facility]) 11/30/2017   • Chronic midline low back pain 10/10/2017   • Obesity hypoventilation syndrome (CMS-HCC) 09/06/2017   • Binge eating disorder 07/06/2017   • Morbid obesity with BMI of 40.0-44.9, adult (CMS-HCC) 03/07/2017   • Nocturnal sleep-related eating disorder 08/18/2015   • Bilateral edema of lower extremity 08/10/2015   • S/P nasal septoplasty 06/16/2015   • Anxiety 02/17/2015   • History of substance abuse 02/17/2015   • Hyperlipidemia 02/17/2015   • GERD (gastroesophageal reflux disease)  07/18/2013   • Tobacco abuse 07/18/2013   • Chest wall pain 07/18/2013     Family History   Problem Relation Age of Onset   • Lung Disease Mother    • Stroke Mother    • Heart Disease Maternal Grandfather    • Cancer Neg Hx    • Diabetes Neg Hx      Social History     Social History   • Marital status: Single     Spouse name: N/A   • Number of children: N/A   • Years of education: N/A     Occupational History   • Not on file.     Social History Main Topics   • Smoking status: Current Every Day Smoker     Packs/day: 0.50     Years: 15.00     Types: Cigarettes   • Smokeless tobacco: Never Used   • Alcohol use No      Comment: h/o heavy drinking x 16 years. quit drinking in May 2013   • Drug use:      Types: Marijuana, Inhaled      Comment: history of meth use, quit 2007, marijuana daily   • Sexual activity: Not Currently     Partners: Male     Other Topics Concern   • Not on file     Social History Narrative   • No narrative on file     Current Outpatient Prescriptions   Medication Sig Dispense Refill   • furosemide (LASIX) 20 MG Tab TAKE ONE TABLET BY MOUTH ONCE DAILY 30 Tab 3   • atorvastatin (LIPITOR) 40 MG Tab TAKE ONE TABLET BY MOUTH ONCE DAILY IN THE EVENING  *INCREASED DOSE* 30 Tab 3   • topiramate (TOPAMAX) 100 MG Tab Take 150 mg twice daily (100 mg + 50 mg tab) 60 Tab 3   • topiramate (TOPAMAX) 50 MG tablet Take 150 mg twice daily (100 mg + 50 mg tab) 60 Tab 3   • ranitidine (ZANTAC) 150 MG Tab TAKE ONE TABLET BY MOUTH TWICE DAILY WITH MEALS 60 Tab 3   • albuterol (PROVENTIL) 2.5mg/3ml Nebu Soln solution for nebulization USE ONE VIAL IN NEBULIZER EVERY 4 HOURS AS NEEDED FOR SHORTNESS OF BREATH 360 mL 3   • VENTOLIN  (90 BASE) MCG/ACT Aero Soln inhalation aerosol INHALE TWO PUFFS BY MOUTH EVERY 6 HOURS AS NEEDED FOR SHORTNESS OF BREATH 1 Inhaler 3   • NON SPECIFIED Bilateral knee high compression stockings for leg swelling. 2 Each 0   • clonazepam (KLONOPIN) 0.5 MG Tab      • prazosin (MINIPRESS) 5 MG  "Cap      • mirtazapine (REMERON) 15 MG Tab        No current facility-administered medications for this visit.            Review Of Systems  As documented in HPI above  PHYSICAL EXAMINATION:    /62   Pulse 100   Temp 36.6 °C (97.9 °F)   Resp 16   Ht 1.753 m (5' 9.02\")   Wt (!) 129.7 kg (286 lb)   SpO2 92%   Breastfeeding? No   BMI 42.22 kg/m²   Gen.: Well-developed, well-nourished, no apparent distress, pleasant and cooperative with the examination  HEENT: Normocephalic/atraumatic,      ASSESSMENT/Plan:    1. Other hyperlipidemia  Advised to continue atorvastatin since her LDL was very high in the past more than 200, no side effects, better control on medication    2. . Morbid obesity: Patient to complete her sleep studies and I will consider referring her to bariatric surgery     3.impaired glucose tolerance: Counseled for healthy left on modifications, weight loss and diet control. Will recheck A1c in 6 months  Please note that this dictation was created using voice recognition software. I have made every reasonable attempt to correct obvious errors but there may be errors of grammar and content that I may have overlooked prior to finalization of this note.       "

## 2018-01-10 ENCOUNTER — HOSPITAL ENCOUNTER (OUTPATIENT)
Dept: OTHER | Facility: MEDICAL CENTER | Age: 38
End: 2018-01-10
Attending: FAMILY MEDICINE
Payer: MEDICAID

## 2018-01-10 PROCEDURE — 94729 DIFFUSING CAPACITY: CPT

## 2018-01-10 PROCEDURE — 94726 PLETHYSMOGRAPHY LUNG VOLUMES: CPT | Mod: 26 | Performed by: INTERNAL MEDICINE

## 2018-01-10 PROCEDURE — 94060 EVALUATION OF WHEEZING: CPT | Mod: 26 | Performed by: INTERNAL MEDICINE

## 2018-01-10 PROCEDURE — 94060 EVALUATION OF WHEEZING: CPT

## 2018-01-10 PROCEDURE — 94729 DIFFUSING CAPACITY: CPT | Mod: 26 | Performed by: INTERNAL MEDICINE

## 2018-01-10 PROCEDURE — 94726 PLETHYSMOGRAPHY LUNG VOLUMES: CPT

## 2018-01-10 ASSESSMENT — PULMONARY FUNCTION TESTS
FEV1/FVC: 80
FEV1/FVC_PERCENT_PREDICTED: 96
FEV1/FVC: 80
FEV1/FVC_PERCENT_CHANGE: 158
FEV1/FVC_PERCENT_PREDICTED: 103
FEV1_PERCENT_PREDICTED: 94
FEV1/FVC_PERCENT_CHANGE: 5
FEV1/FVC: 84.53
FEV1/FVC_PERCENT_PREDICTED: 82
FEV1: 2.65
FEV1_PERCENT_CHANGE: 12
FVC_LLN: 3.41
FVC_PERCENT_PREDICTED: 81
FVC: 3.32
FEV1_PERCENT_PREDICTED: 78
FEV1_PREDICTED: 3.36
FVC_LLN: 3.41
FVC_PERCENT_PREDICTED: 91
FEV1/FVC_PERCENT_PREDICTED: 101
FVC: 3.75
FEV1_LLN: 2.81
FEV1/FVC_PERCENT_PREDICTED: 96
FEV1_PERCENT_CHANGE: 19
FEV1/FVC_PERCENT_LLN: 69
FEV1/FVC: 85
FEV1_LLN: 2.81
FEV1: 3.17
FEV1/FVC_PREDICTED: 83
FEV1/FVC_PERCENT_LLN: 69
FVC_PREDICTED: 4.08

## 2018-01-16 ENCOUNTER — OFFICE VISIT (OUTPATIENT)
Dept: MEDICAL GROUP | Facility: MEDICAL CENTER | Age: 38
End: 2018-01-16
Attending: FAMILY MEDICINE
Payer: MEDICAID

## 2018-01-16 VITALS
HEART RATE: 100 BPM | BODY MASS INDEX: 44.26 KG/M2 | WEIGHT: 282 LBS | DIASTOLIC BLOOD PRESSURE: 70 MMHG | TEMPERATURE: 96.7 F | SYSTOLIC BLOOD PRESSURE: 123 MMHG | HEIGHT: 67 IN | RESPIRATION RATE: 18 BRPM | OXYGEN SATURATION: 96 %

## 2018-01-16 DIAGNOSIS — M79.604 DIFFUSE PAIN IN RIGHT LOWER EXTREMITY: ICD-10-CM

## 2018-01-16 DIAGNOSIS — Z72.0 TOBACCO ABUSE: ICD-10-CM

## 2018-01-16 DIAGNOSIS — J45.909 MODERATE ASTHMA WITHOUT COMPLICATION, UNSPECIFIED WHETHER PERSISTENT: ICD-10-CM

## 2018-01-16 PROCEDURE — 99214 OFFICE O/P EST MOD 30 MIN: CPT | Performed by: FAMILY MEDICINE

## 2018-01-16 PROCEDURE — 99213 OFFICE O/P EST LOW 20 MIN: CPT | Performed by: FAMILY MEDICINE

## 2018-01-16 RX ORDER — NAPROXEN 500 MG/1
500 TABLET ORAL 2 TIMES DAILY WITH MEALS
Qty: 20 TAB | Refills: 0 | Status: SHIPPED | OUTPATIENT
Start: 2018-01-16 | End: 2018-02-06

## 2018-01-16 NOTE — PROGRESS NOTES
Chief Complaint:   Chief Complaint   Patient presents with   • Pain     right leg pain for 3 weeks. 6-7 pain scale. numbness in both feet.        HPI: Established patient   Tracy Calderon is a 37 y.o. female who presents for follow-up. Concerns about pain for the past 3-4 weeks in her right lower extremity     Diffuse pain in right lower extremity  Patient reports that she has this pain in her right lower extremities towards starting from the front of the thigh area extending to the knee and the leg and foot, has been going on for at least 3-4 weeks now, denies injury or trauma or falling, no swelling or change in color or temperature. Patient said she has history of fracture of the right leg and in the past. She developed similar pain, but not for long time now, until 4 weeks ago, patient said it might be related to cold weather, but I'm not sure. She also reports having bilateral feet numbness and tingling sensation sometimes. Not associated with weakness     Tobacco abuse   Smoker, continues to smoke and not yet ready to completely stop smoking.     Moderate asthma without complication, unspecified whether persistent   History of asthma, recent pulmonary function test with confirmation of diagnosis of moderate asthma without evidence of chronic obstructive lung disease. Continues to use all her inhalers and denies any symptoms of shortness of breath or chest pain or congestion or cough at this time          Past medical history, family history, social history and medications reviewed and updated in the record. Today   Current medications, problem list and allergies reviewed in [facility] today   Health maintenance topics are reviewed and updated. Today     Patient Active Problem List    Diagnosis Date Noted   • Impaired glucose tolerance 01/02/2018   • Morbid obesity with BMI of 45.0-49.9, adult ([facility]) 11/30/2017   • Chronic midline low back pain 10/10/2017   • Obesity hypoventilation syndrome (CMS-[facility])  09/06/2017   • Binge eating disorder 07/06/2017   • Morbid obesity with BMI of 40.0-44.9, adult (CMS-[facility]) 03/07/2017   • Nocturnal sleep-related eating disorder 08/18/2015   • Bilateral edema of lower extremity 08/10/2015   • S/P nasal septoplasty 06/16/2015   • Anxiety 02/17/2015   • History of substance abuse 02/17/2015   • Hyperlipidemia 02/17/2015   • GERD (gastroesophageal reflux disease) 07/18/2013   • Tobacco abuse 07/18/2013   • Chest wall pain 07/18/2013     Family History   Problem Relation Age of Onset   • Lung Disease Mother    • Stroke Mother    • Heart Disease Maternal Grandfather    • Cancer Neg Hx    • Diabetes Neg Hx      Social History     Social History   • Marital status: Single     Spouse name: N/A   • Number of children: N/A   • Years of education: N/A     Occupational History   • Not on file.     Social History Main Topics   • Smoking status: Current Every Day Smoker     Packs/day: 0.50     Years: 15.00     Types: Cigarettes   • Smokeless tobacco: Never Used   • Alcohol use No      Comment: h/o heavy drinking x 16 years. quit drinking in May 2013   • Drug use:      Types: Marijuana, Inhaled      Comment: history of meth use, quit 2007, marijuana daily   • Sexual activity: Not Currently     Partners: Male     Other Topics Concern   • Not on file     Social History Narrative   • No narrative on file     Current Outpatient Prescriptions   Medication Sig Dispense Refill   • naproxen (NAPROSYN) 500 MG Tab Take 1 Tab by mouth 2 times a day, with meals. 20 Tab 0   • furosemide (LASIX) 20 MG Tab TAKE ONE TABLET BY MOUTH ONCE DAILY 30 Tab 3   • atorvastatin (LIPITOR) 40 MG Tab TAKE ONE TABLET BY MOUTH ONCE DAILY IN THE EVENING  *INCREASED DOSE* 30 Tab 3   • topiramate (TOPAMAX) 100 MG Tab Take 150 mg twice daily (100 mg + 50 mg tab) 60 Tab 3   • topiramate (TOPAMAX) 50 MG tablet Take 150 mg twice daily (100 mg + 50 mg tab) 60 Tab 3   • ranitidine (ZANTAC) 150 MG Tab TAKE ONE TABLET BY MOUTH TWICE DAILY  "WITH MEALS 60 Tab 3   • albuterol (PROVENTIL) 2.5mg/3ml Nebu Soln solution for nebulization USE ONE VIAL IN NEBULIZER EVERY 4 HOURS AS NEEDED FOR SHORTNESS OF BREATH 360 mL 3   • VENTOLIN  (90 BASE) MCG/ACT Aero Soln inhalation aerosol INHALE TWO PUFFS BY MOUTH EVERY 6 HOURS AS NEEDED FOR SHORTNESS OF BREATH 1 Inhaler 3   • NON SPECIFIED Bilateral knee high compression stockings for leg swelling. 2 Each 0   • clonazepam (KLONOPIN) 0.5 MG Tab      • prazosin (MINIPRESS) 5 MG Cap      • mirtazapine (REMERON) 15 MG Tab        No current facility-administered medications for this visit.            Review Of Systems  As documented in HPI above  PHYSICAL EXAMINATION:    /70   Pulse 100   Temp 35.9 °C (96.7 °F)   Resp 18   Ht 1.689 m (5' 6.5\")   Wt (!) 127.9 kg (282 lb)   SpO2 96%   Breastfeeding? No   BMI 44.83 kg/m²   Gen.: Well-developed, well-nourished, no apparent distress, pleasant and cooperative with the examination  HEENT: Normocephalic/atraumatic, sinuses nontender with palpation, TMs clear, nares patent with pink mucosa and clear rhinorrhea, oropharynx clear  Neck: No JVD or bruits, no adenopathy  Cor: Regular rate and rhythm without murmur gallop or rub  Lungs: Clear to auscultation with equal breath sounds bilaterally. No wheezes, rhonchi.  Abdomen: Soft nontender without hepatosplenomegaly or masses appreciated, normoactive bowel sounds  Extremities: No cyanosis, clubbing or edema    Extensive exam the right lower extremity starting from the thigh area to the knee and the lead and the foot shows no evidence of swelling or change in color or temperature or deformity or tenderness.      ASSESSMENT/Plan:  1. Diffuse pain in right lower extremity  , new problem, Possibly this is muscular pain, unlikely to be a blood clot because of diffuse pain description and nature. Discussed with the patient. Red flags like shortness of breath or increased swelling or pain to report. Emergency room, will " treat with anti-inflammatory medication. If it's not resolved to come back for reassessment and further workup    naproxen (NAPROSYN) 500 MG Tab   2. Tobacco abuse  Counseled for smoking cessation    3. Moderate asthma without complication, unspecified whether persistent  Discussed and relayed results for pulmonary function test, confirmation of asthma. No symptoms of acute asthma exacerbation at this time. Continue use of medication, controlled on inhalers        Please note that this dictation was created using voice recognition software. I have made every reasonable attempt to correct obvious errors but there may be errors of grammar and content that I may have overlooked prior to finalization of this note.

## 2018-02-06 ENCOUNTER — OFFICE VISIT (OUTPATIENT)
Dept: MEDICAL GROUP | Facility: MEDICAL CENTER | Age: 38
End: 2018-02-06
Attending: FAMILY MEDICINE
Payer: MEDICAID

## 2018-02-06 VITALS
SYSTOLIC BLOOD PRESSURE: 110 MMHG | WEIGHT: 285 LBS | HEIGHT: 67 IN | RESPIRATION RATE: 18 BRPM | HEART RATE: 100 BPM | DIASTOLIC BLOOD PRESSURE: 70 MMHG | TEMPERATURE: 97.5 F | BODY MASS INDEX: 44.73 KG/M2 | OXYGEN SATURATION: 95 %

## 2018-02-06 DIAGNOSIS — R05.9 COUGH: ICD-10-CM

## 2018-02-06 DIAGNOSIS — R52 BODY ACHES: ICD-10-CM

## 2018-02-06 DIAGNOSIS — M79.604 PAIN OF RIGHT LOWER EXTREMITY: ICD-10-CM

## 2018-02-06 PROCEDURE — 99213 OFFICE O/P EST LOW 20 MIN: CPT | Performed by: FAMILY MEDICINE

## 2018-02-06 PROCEDURE — 99214 OFFICE O/P EST MOD 30 MIN: CPT | Performed by: FAMILY MEDICINE

## 2018-02-06 RX ORDER — IBUPROFEN 400 MG/1
400 TABLET ORAL EVERY 8 HOURS PRN
Qty: 30 TAB | Refills: 0 | Status: SHIPPED | OUTPATIENT
Start: 2018-02-06 | End: 2018-03-06

## 2018-02-06 RX ORDER — AZITHROMYCIN 250 MG/1
TABLET, FILM COATED ORAL
Qty: 6 TAB | Refills: 0 | Status: SHIPPED | OUTPATIENT
Start: 2018-02-06 | End: 2018-03-06

## 2018-02-06 NOTE — PROGRESS NOTES
Chief Complaint:   Chief Complaint   Patient presents with   • Pain     right leg pain        HPI:Established patient     Tracy Calderon is a 37 y.o. female who presents for not feeling well, cough and body ache and subjective fever      Pain of right lower extremity    Patient reports that the pain that was evaluated last visit is not much and she does not even use of anti-inflammatory medication and she is okay. She has no concerts about not pain. She is more concerned about her cough and flulike symptoms at this time.  Cough   Reports that she's been having this on and off cough for at least 3 weeks now, with productive sputum that is greenish, brownish in color, no blood in the sputum, associated with subjective fever that is associated with sweating and chills, and nausea without vomiting, no abdominal pain. No lower extremity edema, sometimes she feels chest congestion and discomfort and mild shortness of breath, she is a smoker and she continues to smoke, not ready to quit at this time    Body aches   Patient reports that for the past 4 days. She's been having more body aches, subjective fever, flulike symptoms and the cough continues with sputum production that is greenish in color      Past medical history, family history, social history and medications reviewed and updated in the record. Today   Current medications, problem list and allergies reviewed in [facility] today   Health maintenance topics are reviewed and updated. Today     Patient Active Problem List    Diagnosis Date Noted   • Impaired glucose tolerance 01/02/2018   • Morbid obesity with BMI of 45.0-49.9, adult ([facility]) 11/30/2017   • Chronic midline low back pain 10/10/2017   • Obesity hypoventilation syndrome (CMS-HCC) 09/06/2017   • Binge eating disorder 07/06/2017   • Morbid obesity with BMI of 40.0-44.9, adult (CMS-HCC) 03/07/2017   • Nocturnal sleep-related eating disorder 08/18/2015   • Bilateral edema of lower extremity 08/10/2015   •  S/P nasal septoplasty 06/16/2015   • Anxiety 02/17/2015   • History of substance abuse 02/17/2015   • Hyperlipidemia 02/17/2015   • GERD (gastroesophageal reflux disease) 07/18/2013   • Tobacco abuse 07/18/2013   • Chest wall pain 07/18/2013     Family History   Problem Relation Age of Onset   • Lung Disease Mother    • Stroke Mother    • Heart Disease Maternal Grandfather    • Cancer Neg Hx    • Diabetes Neg Hx      Social History     Social History   • Marital status: Single     Spouse name: N/A   • Number of children: N/A   • Years of education: N/A     Occupational History   • Not on file.     Social History Main Topics   • Smoking status: Current Every Day Smoker     Packs/day: 0.50     Years: 15.00     Types: Cigarettes   • Smokeless tobacco: Never Used   • Alcohol use No      Comment: h/o heavy drinking x 16 years. quit drinking in May 2013   • Drug use: Yes     Types: Marijuana, Inhaled      Comment: history of meth use, quit 2007, marijuana daily   • Sexual activity: Not Currently     Partners: Male     Other Topics Concern   • Not on file     Social History Narrative   • No narrative on file     Current Outpatient Prescriptions   Medication Sig Dispense Refill   • azithromycin (ZITHROMAX) 250 MG Tab 2 tab first day , then 1 tab daily x 4 days 6 Tab 0   • ibuprofen (MOTRIN) 400 MG Tab Take 1 Tab by mouth every 8 hours as needed. 30 Tab 0   • furosemide (LASIX) 20 MG Tab TAKE ONE TABLET BY MOUTH ONCE DAILY 30 Tab 3   • atorvastatin (LIPITOR) 40 MG Tab TAKE ONE TABLET BY MOUTH ONCE DAILY IN THE EVENING  *INCREASED DOSE* 30 Tab 3   • topiramate (TOPAMAX) 100 MG Tab Take 150 mg twice daily (100 mg + 50 mg tab) 60 Tab 3   • topiramate (TOPAMAX) 50 MG tablet Take 150 mg twice daily (100 mg + 50 mg tab) 60 Tab 3   • ranitidine (ZANTAC) 150 MG Tab TAKE ONE TABLET BY MOUTH TWICE DAILY WITH MEALS 60 Tab 3   • albuterol (PROVENTIL) 2.5mg/3ml Nebu Soln solution for nebulization USE ONE VIAL IN NEBULIZER EVERY 4 HOURS  "AS NEEDED FOR SHORTNESS OF BREATH 360 mL 3   • VENTOLIN  (90 BASE) MCG/ACT Aero Soln inhalation aerosol INHALE TWO PUFFS BY MOUTH EVERY 6 HOURS AS NEEDED FOR SHORTNESS OF BREATH 1 Inhaler 3   • NON SPECIFIED Bilateral knee high compression stockings for leg swelling. 2 Each 0   • clonazepam (KLONOPIN) 0.5 MG Tab      • prazosin (MINIPRESS) 5 MG Cap      • mirtazapine (REMERON) 15 MG Tab        No current facility-administered medications for this visit.          Review Of Systems  As documented in HPI above  PHYSICAL EXAMINATION:    /70   Pulse 100   Temp 36.4 °C (97.5 °F)   Resp 18   Ht 1.689 m (5' 6.5\")   Wt (!) 129.3 kg (285 lb)   SpO2 95%   BMI 45.31 kg/m²   Gen.: Well-developed, well-nourished, no apparent distress, pleasant and cooperative with the examination  HEENT: Normocephalic/atraumatic, sinuses nontender with palpation, TMs clear, nares patent with pink mucosa and clear rhinorrhea, oropharynx clear  Neck: No JVD or bruits, no adenopathy  Cor: Regular rate and rhythm without murmur gallop or rub  Lungs: Clear to auscultation with equal breath sounds bilaterally. No wheezes, rhonchi.  Abdomen: Soft nontender without hepatosplenomegaly or masses appreciated, normoactive bowel sounds  Extremities: No cyanosis, clubbing or edema          ASSESSMENT/Plan:  1. Pain of right lower extremity  As per patient. No concerns about her right lower extremity pain at this time. Resolving without the use of anti-inflammatory medication. Which is suggestive that the pain is possibly muscular in origin    2. Cough    Will treat with Z-Jose Antonio since it's prolonged cough with productive sputum and subjective fever. To cover atypical pneumonia, advise to push fluids and hydration. If not resolved to come back for reassessment  Counseled for smoking cessation  azithromycin (ZITHROMAX) 250 MG Tab   3. Body aches  Use Motrin 400 mg every 8 hours as needed for body aches or fever. Advised to use it with meal. " Advised to hydrate well, push fluids, take it easy. Come back if symptoms did not resolve.    ibuprofen (MOTRIN) 400 MG Tab       Please note that this dictation was created using voice recognition software. I have made every reasonable attempt to correct obvious errors but there may be errors of grammar and content that I may have overlooked prior to finalization of this note.

## 2018-02-20 RX ORDER — RANITIDINE 150 MG/1
150 TABLET ORAL 2 TIMES DAILY WITH MEALS
Qty: 60 TAB | Refills: 3 | Status: SHIPPED | OUTPATIENT
Start: 2018-02-20 | End: 2018-09-11 | Stop reason: SDUPTHER

## 2018-02-23 RX ORDER — ATORVASTATIN CALCIUM 40 MG/1
TABLET, FILM COATED ORAL
Qty: 30 TAB | Refills: 3 | Status: SHIPPED | OUTPATIENT
Start: 2018-02-23 | End: 2018-06-26 | Stop reason: SDUPTHER

## 2018-03-06 ENCOUNTER — OFFICE VISIT (OUTPATIENT)
Dept: MEDICAL GROUP | Facility: MEDICAL CENTER | Age: 38
End: 2018-03-06
Attending: FAMILY MEDICINE
Payer: MEDICAID

## 2018-03-06 VITALS
TEMPERATURE: 98 F | RESPIRATION RATE: 18 BRPM | SYSTOLIC BLOOD PRESSURE: 110 MMHG | DIASTOLIC BLOOD PRESSURE: 69 MMHG | BODY MASS INDEX: 44.57 KG/M2 | WEIGHT: 284 LBS | HEART RATE: 100 BPM | HEIGHT: 67 IN | OXYGEN SATURATION: 93 %

## 2018-03-06 DIAGNOSIS — Z72.0 TOBACCO ABUSE: ICD-10-CM

## 2018-03-06 DIAGNOSIS — E66.01 MORBID OBESITY WITH BMI OF 40.0-44.9, ADULT (HCC): ICD-10-CM

## 2018-03-06 DIAGNOSIS — R05.9 COUGH: ICD-10-CM

## 2018-03-06 PROCEDURE — 99213 OFFICE O/P EST LOW 20 MIN: CPT | Performed by: FAMILY MEDICINE

## 2018-03-06 NOTE — PROGRESS NOTES
Chief Complaint:   Chief Complaint   Patient presents with   • Cough       HPI: Established patient  Tracy Calderon is a 37 y.o. female who presents for follow-up on her cough and bronchitis, and discussed the following concerns. Follow today:         Morbid obesity with BMI of 40.0-44.9, adult (CMS-HCC)   Patient is morbidly obese, has been trying her best to control her diet and limit her calories to 1200 daily. She is not engaged on regular exercise regimen because of her weight. She said she is unable to vigorously are moderately exercise on daily basis, but she stays active all day long. Patient says she is very frustrated and she is unable to lose weight with her lifestyle modifications is interested in weight loss surgery, her apnea test is scheduled for next week.     Cough   Cough has resolved. Patient said she feels much better after using antibiotics, has no concern at this time    Tobacco abuse   Continues to smoke, not ready to quit yet, but she is cutting back. Counseled for smoking cessation      Past medical history, family history, social history and medications reviewed and updated in the record. Today   Current medications, problem list and allergies reviewed in [facility] today   Health maintenance topics are reviewed and updated. Today     Patient Active Problem List    Diagnosis Date Noted   • Impaired glucose tolerance 01/02/2018   • Morbid obesity with BMI of 45.0-49.9, adult ([facility]) 11/30/2017   • Chronic midline low back pain 10/10/2017   • Obesity hypoventilation syndrome (CMS-HCC) 09/06/2017   • Binge eating disorder 07/06/2017   • Morbid obesity with BMI of 40.0-44.9, adult (CMS-HCC) 03/07/2017   • Nocturnal sleep-related eating disorder 08/18/2015   • Bilateral edema of lower extremity 08/10/2015   • S/P nasal septoplasty 06/16/2015   • Anxiety 02/17/2015   • History of substance abuse 02/17/2015   • Hyperlipidemia 02/17/2015   • GERD (gastroesophageal reflux disease) 07/18/2013   •  Tobacco abuse 07/18/2013   • Chest wall pain 07/18/2013     Family History   Problem Relation Age of Onset   • Lung Disease Mother    • Stroke Mother    • Heart Disease Maternal Grandfather    • Cancer Neg Hx    • Diabetes Neg Hx      Social History     Social History   • Marital status: Single     Spouse name: N/A   • Number of children: N/A   • Years of education: N/A     Occupational History   • Not on file.     Social History Main Topics   • Smoking status: Current Every Day Smoker     Packs/day: 0.50     Years: 15.00     Types: Cigarettes   • Smokeless tobacco: Never Used   • Alcohol use No      Comment: h/o heavy drinking x 16 years. quit drinking in May 2013   • Drug use: Yes     Types: Marijuana, Inhaled      Comment: history of meth use, quit 2007, marijuana daily   • Sexual activity: Not Currently     Partners: Male     Other Topics Concern   • Not on file     Social History Narrative   • No narrative on file       Current Outpatient Prescriptions   Medication Sig Dispense Refill   • atorvastatin (LIPITOR) 40 MG Tab TAKE ONE TABLET BY MOUTH ONCE DAILY IN THE EVENING  *INCREASED DOSE* 30 Tab 3   • raNITidine (ZANTAC) 150 MG Tab Take 1 Tab by mouth 2 times a day, with meals. 60 Tab 3   • furosemide (LASIX) 20 MG Tab TAKE ONE TABLET BY MOUTH ONCE DAILY 30 Tab 3   • albuterol (PROVENTIL) 2.5mg/3ml Nebu Soln solution for nebulization USE ONE VIAL IN NEBULIZER EVERY 4 HOURS AS NEEDED FOR SHORTNESS OF BREATH 360 mL 3   • VENTOLIN  (90 BASE) MCG/ACT Aero Soln inhalation aerosol INHALE TWO PUFFS BY MOUTH EVERY 6 HOURS AS NEEDED FOR SHORTNESS OF BREATH 1 Inhaler 3   • NON SPECIFIED Bilateral knee high compression stockings for leg swelling. 2 Each 0   • clonazepam (KLONOPIN) 0.5 MG Tab      • prazosin (MINIPRESS) 5 MG Cap      • mirtazapine (REMERON) 15 MG Tab        No current facility-administered medications for this visit.          Review Of Systems  As documented in HPI above  PHYSICAL EXAMINATION:    BP  "110/69   Pulse 100   Temp 36.7 °C (98 °F)   Resp 18   Ht 1.689 m (5' 6.5\")   Wt (!) 128.8 kg (284 lb)   SpO2 93%   BMI 45.15 kg/m²   Gen.: Well-developed, well-nourished, obese, no apparent distress, pleasant and cooperative with the examination  HEENT: Normocephalic/atraumatic, sinuses nontender with palpation, TMs clear, nares patent with pink mucosa and clear rhinorrhea, oropharynx clear  Neck: No JVD or bruits, no adenopathy  Cor: Regular rate and rhythm without murmur gallop or rub  Lungs: Clear to auscultation with equal breath sounds bilaterally. No wheezes, rhonchi.    Extremities: No cyanosis, clubbing or edema        ASSESSMENT/Plan:  1. Morbid obesity with BMI of 40.0-44.9, adult (CMS-HCC)  . Discussed importance of more exercise and to continue lifestyle modifications. Will wait for her sleep apnea test. If it's positive, patient will be referred to see bariatric for further evaluation of weight loss surgery    2. Cough  Resolved and feeling much better now    3. Tobacco abuse  Counseled strongly for smoking cessation          "

## 2018-03-23 RX ORDER — FUROSEMIDE 20 MG/1
TABLET ORAL
Qty: 30 TAB | Refills: 0 | Status: SHIPPED | OUTPATIENT
Start: 2018-03-23 | End: 2018-04-25 | Stop reason: SDUPTHER

## 2018-03-29 ENCOUNTER — TELEPHONE (OUTPATIENT)
Dept: MEDICAL GROUP | Facility: MEDICAL CENTER | Age: 38
End: 2018-03-29

## 2018-03-29 NOTE — TELEPHONE ENCOUNTER
"Pt received a call from [facility] and the informed her that her insurance will not cover a \"in house sleep study\" and that her insurance will cover a \"finger sleep study\" pt wants to know if she should go that route or what she should do. Please advise.   "

## 2018-04-27 ENCOUNTER — OFFICE VISIT (OUTPATIENT)
Dept: MEDICAL GROUP | Facility: MEDICAL CENTER | Age: 38
End: 2018-04-27
Attending: FAMILY MEDICINE
Payer: MEDICAID

## 2018-04-27 VITALS
OXYGEN SATURATION: 92 % | WEIGHT: 288 LBS | HEIGHT: 67 IN | SYSTOLIC BLOOD PRESSURE: 119 MMHG | HEART RATE: 100 BPM | TEMPERATURE: 96.8 F | DIASTOLIC BLOOD PRESSURE: 73 MMHG | BODY MASS INDEX: 45.2 KG/M2 | RESPIRATION RATE: 18 BRPM

## 2018-04-27 DIAGNOSIS — F17.200 SMOKER: ICD-10-CM

## 2018-04-27 DIAGNOSIS — E66.01 MORBID OBESITY WITH BMI OF 40.0-44.9, ADULT (HCC): ICD-10-CM

## 2018-04-27 DIAGNOSIS — K59.00 CONSTIPATION, UNSPECIFIED CONSTIPATION TYPE: ICD-10-CM

## 2018-04-27 DIAGNOSIS — G47.30 SLEEP APNEA, UNSPECIFIED TYPE: ICD-10-CM

## 2018-04-27 PROCEDURE — 99214 OFFICE O/P EST MOD 30 MIN: CPT | Performed by: FAMILY MEDICINE

## 2018-04-27 PROCEDURE — 99213 OFFICE O/P EST LOW 20 MIN: CPT | Performed by: FAMILY MEDICINE

## 2018-04-27 RX ORDER — DOCUSATE SODIUM 100 MG/1
100 CAPSULE, LIQUID FILLED ORAL 2 TIMES DAILY
Qty: 60 CAP | Refills: 6 | Status: SHIPPED | OUTPATIENT
Start: 2018-04-27 | End: 2019-04-08

## 2018-04-27 RX ORDER — LACTULOSE 10 G/15ML
20 SOLUTION ORAL 2 TIMES DAILY
Qty: 1 BOTTLE | Refills: 1 | Status: SHIPPED | OUTPATIENT
Start: 2018-04-27 | End: 2019-04-08

## 2018-04-27 NOTE — PROGRESS NOTES
Chief Complaint:   Chief Complaint   Patient presents with   • Constipation       HPI: Established patient here today accompanied with her mother  Tracy Calderon is a 37 y.o. female who presents for follow-up on sleep studies, morbid obesity, and concerns about constipation. Discussed the following today as follow:     Morbid obesity with BMI of 40.0-44.9,   , Morbidly obese patient with BMI above 40. She said she did her sleep studies, which she was told that she has severe sleep apnea. I have no records available yet. She would like to be referred for consultation with weight loss surgery has been feeling dieting and exercise. Unable to lose weight    Sleep apnea, unspecified type     Per history patient states that she was told after sleep studies that was done last week that she has severe sleep apnea, no records available yet  Smoker  Continues to smoke. Counseled for smoking cessation today    Constipation, unspecified constipation type     Reports having constipation for the past couple of months, describes her bowel habits aren't hard, painful dark in color stools, she claims that she drinks a lot of water and tries her best to eat a lot of fiber. Did not try any laxatives over-the-counter. Feels abdominal discomfort and bloating. Because of the constipation leading to exacerbation of her hemorrhoidal pain. Denies blood in stool. Denies nausea, vomiting        Past medical history, family history, social history and medications reviewed and updated in the record. today  Current medications, problem list and allergies [facility]  Health maintenance topics are reviewed and updated. today    Patient Active Problem List    Diagnosis Date Noted   • Impaired glucose tolerance 01/02/2018   • Morbid obesity with BMI of 45.0-49.9, adult ([facility]) 11/30/2017   • Chronic midline low back pain 10/10/2017   • Obesity hypoventilation syndrome (CMS-[facility]) 09/06/2017   • Binge eating disorder 07/06/2017   •  Morbid obesity with BMI of 40.0-44.9, adult (CMS-[facility]) 03/07/2017   • Nocturnal sleep-related eating disorder 08/18/2015   • Bilateral edema of lower extremity 08/10/2015   • S/P nasal septoplasty 06/16/2015   • Anxiety 02/17/2015   • History of substance abuse 02/17/2015   • Hyperlipidemia 02/17/2015   • GERD (gastroesophageal reflux disease) 07/18/2013   • Tobacco abuse 07/18/2013   • Chest wall pain 07/18/2013     Family History   Problem Relation Age of Onset   • Lung Disease Mother    • Stroke Mother    • Heart Disease Maternal Grandfather    • Cancer Neg Hx    • Diabetes Neg Hx      Social History     Social History   • Marital status: Single     Spouse name: N/A   • Number of children: N/A   • Years of education: N/A     Occupational History   • Not on file.     Social History Main Topics   • Smoking status: Current Every Day Smoker     Packs/day: 0.50     Years: 15.00     Types: Cigarettes   • Smokeless tobacco: Never Used   • Alcohol use No      Comment: h/o heavy drinking x 16 years. quit drinking in May 2013   • Drug use: Yes     Types: Marijuana, Inhaled      Comment: history of meth use, quit 2007, marijuana daily   • Sexual activity: Not Currently     Partners: Male     Other Topics Concern   • Not on file     Social History Narrative   • No narrative on file       Current Outpatient Prescriptions   Medication Sig Dispense Refill   • lactulose 10 GM/15ML Solution Take 30 mL by mouth 2 times a day. 1 Bottle 1   • docusate sodium (COLACE) 100 MG Cap Take 1 Cap by mouth 2 times a day. 60 Cap 6   • furosemide (LASIX) 20 MG Tab TAKE 1 TABLET BY MOUTH ONCE DAILY 30 Tab 5   • atorvastatin (LIPITOR) 40 MG Tab TAKE ONE TABLET BY MOUTH ONCE DAILY IN THE EVENING  *INCREASED DOSE* 30 Tab 3   • raNITidine (ZANTAC) 150 MG Tab Take 1 Tab by mouth 2 times a day, with meals. 60 Tab 3   • albuterol (PROVENTIL) 2.5mg/3ml Nebu Soln solution for nebulization USE ONE VIAL IN NEBULIZER EVERY 4 HOURS AS NEEDED FOR SHORTNESS OF  "BREATH 360 mL 3   • VENTOLIN  (90 BASE) MCG/ACT Aero Soln inhalation aerosol INHALE TWO PUFFS BY MOUTH EVERY 6 HOURS AS NEEDED FOR SHORTNESS OF BREATH 1 Inhaler 3   • NON SPECIFIED Bilateral knee high compression stockings for leg swelling. 2 Each 0   • clonazepam (KLONOPIN) 0.5 MG Tab      • prazosin (MINIPRESS) 5 MG Cap      • mirtazapine (REMERON) 15 MG Tab        No current facility-administered medications for this visit.          Review Of Systems  As documented in HPI above  PHYSICAL EXAMINATION:    /73   Pulse 100   Temp 36 °C (96.8 °F)   Resp 18   Ht 1.689 m (5' 6.5\")   Wt (!) 130.6 kg (288 lb)   SpO2 92%   BMI 45.79 kg/m²   Gen.: Well-developed, well-nourished, morbidly obese, no apparent distress, pleasant and cooperative with the examination  HEENT: Normocephalic/atraumatic, sinuses nontender with palpation, TMs clear, nares patent with pink mucosa and clear rhinorrhea, oropharynx clear  Neck: No JVD or bruits, no adenopathy  Cor: Regular rate and rhythm without murmur gallop or rub    Abdomen: Soft nontender without hepatosplenomegaly or masses appreciated, normoactive bowel sounds  Extremities: No cyanosis, clubbing or edema          ASSESSMENT/Plan:  1. Morbid obesity with BMI of 40.0-44.9, adult (CMS-HCC)  Complicated by metabolic syndrome and sleep apnea. We'll refer for weight loss consultation bariatric surgery.    REFERRAL TO BARIATRIC SURGERY   2. Sleep apnea, unspecified type  Waiting for records from sleep Center, patient said she was told she has severe sleep apnea did not start the sleep apnea at    3. Smoker  Counseled strongly for smoking cessation    4. Constipation, unspecified constipation type  . Discussed importance of increased fiber diet and increase hydration and water intake. Also patient on lactulose to use it for couple of days until her bowel movements become loose and clear, and then she can stop it and start Colace twice a day. Follow-up in couple of weeks " for further evaluation, no red flags at this time.    lactulose 10 GM/15ML Solution    docusate sodium (COLACE) 100 MG Cap       Please note that this dictation was created using voice recognition software. I have made every reasonable attempt to correct obvious errors but there may be errors of grammar and content that I may have overlooked prior to finalization of this note.

## 2018-05-03 RX ORDER — ALBUTEROL SULFATE 90 UG/1
AEROSOL, METERED RESPIRATORY (INHALATION)
Qty: 1 INHALER | Refills: 3 | Status: SHIPPED | OUTPATIENT
Start: 2018-05-03 | End: 2018-06-06 | Stop reason: SDUPTHER

## 2018-06-07 RX ORDER — ALBUTEROL SULFATE 90 UG/1
AEROSOL, METERED RESPIRATORY (INHALATION)
Qty: 1 INHALER | Refills: 3 | Status: SHIPPED | OUTPATIENT
Start: 2018-06-07 | End: 2018-06-13 | Stop reason: SDUPTHER

## 2018-06-13 ENCOUNTER — OFFICE VISIT (OUTPATIENT)
Dept: MEDICAL GROUP | Facility: MEDICAL CENTER | Age: 38
End: 2018-06-13
Attending: NURSE PRACTITIONER
Payer: MEDICAID

## 2018-06-13 VITALS
TEMPERATURE: 98.8 F | SYSTOLIC BLOOD PRESSURE: 116 MMHG | HEIGHT: 66 IN | DIASTOLIC BLOOD PRESSURE: 60 MMHG | BODY MASS INDEX: 46.28 KG/M2 | WEIGHT: 288 LBS | OXYGEN SATURATION: 93 % | RESPIRATION RATE: 20 BRPM | HEART RATE: 90 BPM

## 2018-06-13 DIAGNOSIS — J44.9 CHRONIC OBSTRUCTIVE PULMONARY DISEASE, UNSPECIFIED COPD TYPE (HCC): ICD-10-CM

## 2018-06-13 PROCEDURE — 99212 OFFICE O/P EST SF 10 MIN: CPT | Performed by: NURSE PRACTITIONER

## 2018-06-13 PROCEDURE — 99213 OFFICE O/P EST LOW 20 MIN: CPT | Performed by: NURSE PRACTITIONER

## 2018-06-13 RX ORDER — ALBUTEROL SULFATE 90 UG/1
AEROSOL, METERED RESPIRATORY (INHALATION)
Qty: 1 INHALER | Refills: 3 | Status: SHIPPED | OUTPATIENT
Start: 2018-06-13 | End: 2019-08-09 | Stop reason: SDUPTHER

## 2018-06-13 ASSESSMENT — PAIN SCALES - GENERAL: PAINLEVEL: NO PAIN

## 2018-06-13 NOTE — PROGRESS NOTES
"Chief Complaint:   Chief Complaint   Patient presents with   • Medication Refill       HPI:  Tracy is here today for Inhaler refill request.    Her PCP, Dr. Lin is not available to see her today.    Her PMH includes:  Anxiety  Binge Eating   Chest Wall pain  Chronic LBP  GERD  COPD/Asthma  Substance abuse hx ( Alcohol, Meth)  Hyperlipidemia  Morbid Obesity  Impaired Glucose Tolerance  Nocturnal sleep eating disorder  Tobacco abuse-smoking  Nasal Septoplasty  Tonsillectomy  Marijuana smoking    [place]  Report:   5/16/18 Clonazepam 0.5 mg # 60 by Carrie Francis  Similar monthly entries in report  11 RX and 1 Prescribing MD  ------------------------------------------------------------------------    REview of REcords:  4/27/18 Clinic visit w Dr Lin r/t obesity, sleep apnea, smoking, constipation--> Refer to Bariatrics, RX Lactulose, Colace.    Obstructive lung disease (generalized) ([facility])  [facility] PFT Results - jan 2018:  IMPRESSION:  Moderate obstructive lung disease consistent with a clinical   diagnosis of asthma.  Compared to a prior study in 2013, there has been a   decrease in baseline FEV1.    She reports she has \"asthma\" and inhaler for Albuterol was to be sent to her [facility] pharmacy  On 6/7/18. She went to [facility] and they reported they had not received it.  Our records show it was sent 6/7/18. Will re-write hard copy and Patient to take to her pharmacy.    Pt reports mild wheezing, Denies feeling ill. I discussed that smoking is not a good idea in general,  And specifically because she has Asthma and recommend she quit, but Patient not ready.    Patient Active Problem List    Diagnosis Date Noted   • Impaired glucose tolerance 01/02/2018   • Morbid obesity with BMI of 45.0-49.9, adult ([facility]) 11/30/2017   • Chronic midline low back pain 10/10/2017   • Obesity hypoventilation syndrome ([facility]) 09/06/2017   • Binge eating disorder 07/06/2017   • Morbid obesity with BMI of 40.0-44.9, adult ([facility]) 03/07/2017 "   • Nocturnal sleep-related eating disorder 08/18/2015   • Bilateral edema of lower extremity 08/10/2015   • S/P nasal septoplasty 06/16/2015   • Obstructive lung disease (generalized) (HCC) 02/17/2015   • Anxiety 02/17/2015   • History of substance abuse 02/17/2015   • Hyperlipidemia 02/17/2015   • GERD (gastroesophageal reflux disease) 07/18/2013   • Tobacco abuse 07/18/2013   • Chest wall pain 07/18/2013       Allergies:Penicillins and Vicodin [hydrocodone-acetaminophen]    Medicines as of today:  Current Outpatient Prescriptions   Medication Sig Dispense Refill   • albuterol (VENTOLIN HFA) 108 (90 Base) MCG/ACT Aero Soln inhalation aerosol INHALE TWO PUFFS BY MOUTH EVERY 6 HOURS AS NEEDED FOR SHORTNESS OF BREATH 1 Inhaler 3   • lactulose 10 GM/15ML Solution Take 30 mL by mouth 2 times a day. 1 Bottle 1   • docusate sodium (COLACE) 100 MG Cap Take 1 Cap by mouth 2 times a day. 60 Cap 6   • furosemide (LASIX) 20 MG Tab TAKE 1 TABLET BY MOUTH ONCE DAILY 30 Tab 5   • atorvastatin (LIPITOR) 40 MG Tab TAKE ONE TABLET BY MOUTH ONCE DAILY IN THE EVENING  *INCREASED DOSE* 30 Tab 3   • raNITidine (ZANTAC) 150 MG Tab Take 1 Tab by mouth 2 times a day, with meals. 60 Tab 3   • albuterol (PROVENTIL) 2.5mg/3ml Nebu Soln solution for nebulization USE ONE VIAL IN NEBULIZER EVERY 4 HOURS AS NEEDED FOR SHORTNESS OF BREATH 360 mL 3   • clonazepam (KLONOPIN) 0.5 MG Tab      • prazosin (MINIPRESS) 5 MG Cap      • mirtazapine (REMERON) 15 MG Tab        No current facility-administered medications for this visit.        Social History   Substance Use Topics   • Smoking status: Current Every Day Smoker     Packs/day: 0.50     Years: 15.00     Types: Cigarettes   • Smokeless tobacco: Never Used   • Alcohol use No      Comment: h/o heavy drinking x 16 years. quit drinking in May 2013       Past Medical History:   Diagnosis Date   • Anesthesia     panic attacks and needs Mom to be there when waking up   • Anxiety    • Asthma     will  "bring inhalers   • Binge eating 7/6/2017   • Bipolar disorder (HCC)    • Breath shortness    • COPD (chronic obstructive pulmonary disease) (HCC) 2/17/2015   • Cricopharyngeal achalasia 3/12/2015   • Depression    • ETOH abuse    • Heart burn    • History of substance abuse 2/17/2015    ETOH-quit 2013, meth-quit 2007   • Hyperlipidemia 2/17/2015   • Hyperlipidemia 2/17/2015   • Indigestion    • Obesity hypoventilation syndrome (HCC) 9/6/2017   • Psychiatric disorder    • Snoring        Family History   Problem Relation Age of Onset   • Lung Disease Mother    • Stroke Mother    • Heart Disease Maternal Grandfather    • Cancer Neg Hx    • Diabetes Neg Hx        ROS:  Review of Systems   See HPI Above    Exam:  Blood pressure 116/60, pulse 90, temperature 37.1 °C (98.8 °F), resp. rate 20, height 1.689 m (5' 6.5\"), weight (!) 130.6 kg (288 lb), SpO2 93 %. Body mass index is 45.79 kg/m².    General:  Well nourished, over-weight, well developed female in NAD  HENT:Head is grossly normal. PERRL.  Neck: Supple. Trachea is midline.  Pulmonary: Bilat upper airway mild inspiratory wheezes.  Normal effort. No rales or ronchi.  Cardiovascular: Regular rate and rhythm.  Abdomen-Abdomen is soft  Upper extremities-  Good ROM  Lower extremities- neg for edema  Neuro- A & O x 4. Speech clear and appropriate.    Current medications, allergies, and problem list reviewed with patient and updated in [facility] today.    Assessment/Plan:  1. Chronic obstructive pulmonary disease, unspecified COPD type ([facility])  albuterol (VENTOLIN HFA) 108 (90 Base) MCG/ACT Aero Soln inhalation aerosol       Return in about 2 weeks (around 6/28/2018) w Dr Lin as previously scheduled.  "

## 2018-06-13 NOTE — ASSESSMENT & PLAN NOTE
[facility] PFT Results - jan 2018:  IMPRESSION:  Moderate obstructive lung disease consistent with a clinical   diagnosis of asthma.  Compared to a prior study in 2013, there has been a   decrease in baseline FEV1.

## 2018-06-19 ENCOUNTER — HOSPITAL ENCOUNTER (OUTPATIENT)
Dept: LAB | Facility: MEDICAL CENTER | Age: 38
End: 2018-06-19
Attending: COLON & RECTAL SURGERY
Payer: MEDICAID

## 2018-06-19 LAB
EST. AVERAGE GLUCOSE BLD GHB EST-MCNC: 114 MG/DL
HBA1C MFR BLD: 5.6 % (ref 0–5.6)
TSH SERPL DL<=0.005 MIU/L-ACNC: 1.04 UIU/ML (ref 0.38–5.33)

## 2018-06-19 PROCEDURE — 84443 ASSAY THYROID STIM HORMONE: CPT

## 2018-06-19 PROCEDURE — 83036 HEMOGLOBIN GLYCOSYLATED A1C: CPT

## 2018-06-19 PROCEDURE — 36415 COLL VENOUS BLD VENIPUNCTURE: CPT

## 2018-06-26 RX ORDER — ATORVASTATIN CALCIUM 40 MG/1
TABLET, FILM COATED ORAL
Qty: 30 TAB | Refills: 3 | Status: SHIPPED | OUTPATIENT
Start: 2018-06-26 | End: 2018-10-30 | Stop reason: SDUPTHER

## 2018-07-26 ENCOUNTER — OFFICE VISIT (OUTPATIENT)
Dept: MEDICAL GROUP | Facility: MEDICAL CENTER | Age: 38
End: 2018-07-26
Attending: FAMILY MEDICINE
Payer: MEDICAID

## 2018-07-26 VITALS
DIASTOLIC BLOOD PRESSURE: 62 MMHG | BODY MASS INDEX: 45.3 KG/M2 | RESPIRATION RATE: 16 BRPM | OXYGEN SATURATION: 93 % | SYSTOLIC BLOOD PRESSURE: 102 MMHG | WEIGHT: 288.6 LBS | TEMPERATURE: 98.9 F | HEIGHT: 67 IN | HEART RATE: 100 BPM

## 2018-07-26 DIAGNOSIS — F17.200 SMOKING: ICD-10-CM

## 2018-07-26 DIAGNOSIS — B35.6 TINEA CRURIS: ICD-10-CM

## 2018-07-26 DIAGNOSIS — E66.01 MORBID OBESITY WITH BMI OF 45.0-49.9, ADULT (HCC): ICD-10-CM

## 2018-07-26 PROCEDURE — 99213 OFFICE O/P EST LOW 20 MIN: CPT | Performed by: FAMILY MEDICINE

## 2018-07-26 RX ORDER — VARENICLINE TARTRATE 1 MG/1
1 TABLET, FILM COATED ORAL 2 TIMES DAILY
Qty: 60 TAB | Refills: 3 | Status: SHIPPED | OUTPATIENT
Start: 2018-07-26 | End: 2018-08-30

## 2018-07-26 RX ORDER — CLOTRIMAZOLE AND BETAMETHASONE DIPROPIONATE 10; .64 MG/G; MG/G
1 CREAM TOPICAL 2 TIMES DAILY
Qty: 1 TUBE | Refills: 1 | Status: SHIPPED | OUTPATIENT
Start: 2018-07-26 | End: 2018-08-30 | Stop reason: SDUPTHER

## 2018-07-26 NOTE — PROGRESS NOTES
Chief Complaint:   Chief Complaint   Patient presents with   • Nicotine Dependence     wants to quit       HPI:Established patient   Tracy Calderon is a 37 y.o. female who presents for smoking and weight loss counseling     Morbid obesity with BMI of 45.0-49.9, adult      Patient is BMI above 45, has been following up with bariatric surgery for further evaluation for weight loss surgery, has been attending seminars, required by insurance for monthly follow-up to monitor her weight while on special dietary restriction and exercise. Patient is being exercising regularly by swimming several times a week and restricting her diet and calories with low-fat diet, no carbs.   Smoking  Ready to quit smoking. Requesting Chantix. Patient states that she consult with her psychiatrist and there is no contraindication with her medications, patients that. She denies severe depression or suicide ligations and would like to try Chantix to help her with smoking cessation tried nicotine patches in the past, but did not help much.    Tinea cruris  Reports having this rash and itching and discomfort and redness underneath the breast and skin crease area would like to refill the antifungal cream.          Past medical history, family history, social history and medications reviewed and updated in the record. today  Current medications, problem list and allergies reviewed in [facility] today    Health maintenance topics are reviewed and updated. today    Patient Active Problem List    Diagnosis Date Noted   • Impaired glucose tolerance 01/02/2018   • Morbid obesity with BMI of 45.0-49.9, adult ([facility]) 11/30/2017   • Chronic midline low back pain 10/10/2017   • Obesity hypoventilation syndrome ([facility]) 09/06/2017   • Binge eating disorder 07/06/2017   • Morbid obesity with BMI of 40.0-44.9, adult ([facility]) 03/07/2017   • Nocturnal sleep-related eating disorder 08/18/2015   • Bilateral edema of lower extremity 08/10/2015   • S/P nasal septoplasty  06/16/2015   • Obstructive lung disease (generalized) (HCC) 02/17/2015   • Anxiety 02/17/2015   • History of substance abuse 02/17/2015   • Hyperlipidemia 02/17/2015   • GERD (gastroesophageal reflux disease) 07/18/2013   • Tobacco abuse 07/18/2013   • Chest wall pain 07/18/2013     Family History   Problem Relation Age of Onset   • Lung Disease Mother    • Stroke Mother    • Heart Disease Maternal Grandfather    • Cancer Neg Hx    • Diabetes Neg Hx      Social History     Social History   • Marital status: Single     Spouse name: N/A   • Number of children: N/A   • Years of education: N/A     Occupational History   • Not on file.     Social History Main Topics   • Smoking status: Current Every Day Smoker     Packs/day: 0.50     Years: 15.00     Types: Cigarettes   • Smokeless tobacco: Never Used   • Alcohol use No      Comment: h/o heavy drinking x 16 years. quit drinking in May 2013   • Drug use: Yes     Types: Marijuana, Inhaled      Comment: history of meth use, quit 2007, marijuana daily   • Sexual activity: Not Currently     Partners: Male     Other Topics Concern   • Not on file     Social History Narrative   • No narrative on file       Current Outpatient Prescriptions   Medication Sig Dispense Refill   • varenicline (CHANTIX) 1 MG tablet Take 1 Tab by mouth 2 times a day. 60 Tab 3   • clotrimazole-betamethasone (LOTRISONE) 1-0.05 % Cream Apply 1 Application to affected area(s) 2 times a day. 1 Tube 1   • atorvastatin (LIPITOR) 40 MG Tab TAKE 1 TABLET BY MOUTH ONCE DAILY IN THE EVENING 30 Tab 3   • albuterol (VENTOLIN HFA) 108 (90 Base) MCG/ACT Aero Soln inhalation aerosol INHALE TWO PUFFS BY MOUTH EVERY 6 HOURS AS NEEDED FOR SHORTNESS OF BREATH 1 Inhaler 3   • lactulose 10 GM/15ML Solution Take 30 mL by mouth 2 times a day. 1 Bottle 1   • docusate sodium (COLACE) 100 MG Cap Take 1 Cap by mouth 2 times a day. 60 Cap 6   • furosemide (LASIX) 20 MG Tab TAKE 1 TABLET BY MOUTH ONCE DAILY 30 Tab 5   • raNITidine  "(ZANTAC) 150 MG Tab Take 1 Tab by mouth 2 times a day, with meals. 60 Tab 3   • albuterol (PROVENTIL) 2.5mg/3ml Nebu Soln solution for nebulization USE ONE VIAL IN NEBULIZER EVERY 4 HOURS AS NEEDED FOR SHORTNESS OF BREATH 360 mL 3   • clonazepam (KLONOPIN) 0.5 MG Tab      • prazosin (MINIPRESS) 5 MG Cap      • mirtazapine (REMERON) 15 MG Tab        No current facility-administered medications for this visit.          Review Of Systems  As documented in HPI above  PHYSICAL EXAMINATION:    /62   Pulse 100   Temp 37.2 °C (98.9 °F)   Resp 16   Ht 1.689 m (5' 6.5\")   Wt (!) 130.9 kg (288 lb 9.6 oz)   SpO2 93%   BMI 45.88 kg/m²   Gen.: Well-developed, well-nourished, morbidly obese no apparent distress, pleasant and cooperative with the examination  HEENT: Normocephalic/atraumatic, sinuses nontender with palpation, TMs clear, nares patent with pink mucosa and clear rhinorrhea, oropharynx clear  Neck: No JVD or bruits, no adenopathy  Cor: Regular rate and rhythm without murmur gallop or rub  Lungs: Clear to auscultation with equal breath sounds bilaterally. No wheezes, rhonchi.  Abdomen: Soft nontender without hepatosplenomegaly or masses appreciated, normoactive bowel sounds  Extremities: No cyanosis, clubbing or edema     rash underneath the breast, bilateral, typical for tinea cruris      ASSESSMENT/Plan:  1. Morbid obesity with BMI of 45.0-49.9, adult ([facility])  Follow-up with bariatric as directed. Patient to continue dietary restriction and exercise. Medical forms reviewed and filled today and faxed to Dr. Lindsey office    2. Smoking  Reviewed and discussed medication side effects, will start patient on Chantix. Advised to start half a tablet daily for 3 days and to tolerate and then half a tablet twice a day for 3 days and then one tablet twice a day for smoking cessation  varenicline (CHANTIX) 1 MG tablet   3. Tinea cruris  clotrimazole-betamethasone (LOTRISONE) 1-0.05 % Cream       Please note that this " dictation was created using voice recognition software. I have made every reasonable attempt to correct obvious errors but there may be errors of grammar and content that I may have overlooked prior to finalization of this note.

## 2018-08-30 ENCOUNTER — OFFICE VISIT (OUTPATIENT)
Dept: MEDICAL GROUP | Facility: MEDICAL CENTER | Age: 38
End: 2018-08-30
Attending: FAMILY MEDICINE
Payer: MEDICAID

## 2018-08-30 VITALS
RESPIRATION RATE: 20 BRPM | TEMPERATURE: 98.6 F | OXYGEN SATURATION: 92 % | SYSTOLIC BLOOD PRESSURE: 122 MMHG | HEART RATE: 94 BPM | HEIGHT: 66 IN | WEIGHT: 288 LBS | DIASTOLIC BLOOD PRESSURE: 82 MMHG | BODY MASS INDEX: 46.28 KG/M2

## 2018-08-30 DIAGNOSIS — F17.200 SMOKER: ICD-10-CM

## 2018-08-30 DIAGNOSIS — E66.01 MORBID OBESITY WITH BMI OF 40.0-44.9, ADULT (HCC): ICD-10-CM

## 2018-08-30 DIAGNOSIS — B35.6 TINEA CRURIS: ICD-10-CM

## 2018-08-30 PROCEDURE — 99212 OFFICE O/P EST SF 10 MIN: CPT | Performed by: FAMILY MEDICINE

## 2018-08-30 PROCEDURE — 99213 OFFICE O/P EST LOW 20 MIN: CPT | Performed by: FAMILY MEDICINE

## 2018-08-30 RX ORDER — CLOTRIMAZOLE AND BETAMETHASONE DIPROPIONATE 10; .64 MG/G; MG/G
1 CREAM TOPICAL 2 TIMES DAILY
Qty: 1 TUBE | Refills: 1 | Status: SHIPPED | OUTPATIENT
Start: 2018-08-30 | End: 2018-10-30 | Stop reason: SDUPTHER

## 2018-08-30 ASSESSMENT — PATIENT HEALTH QUESTIONNAIRE - PHQ9: CLINICAL INTERPRETATION OF PHQ2 SCORE: 0

## 2018-08-30 ASSESSMENT — PAIN SCALES - GENERAL: PAINLEVEL: NO PAIN

## 2018-09-11 RX ORDER — RANITIDINE 150 MG/1
TABLET ORAL
Qty: 60 TAB | Refills: 1 | Status: SHIPPED | OUTPATIENT
Start: 2018-09-11 | End: 2018-12-10 | Stop reason: SDUPTHER

## 2018-09-27 ENCOUNTER — OFFICE VISIT (OUTPATIENT)
Dept: MEDICAL GROUP | Facility: MEDICAL CENTER | Age: 38
End: 2018-09-27
Attending: FAMILY MEDICINE
Payer: MEDICAID

## 2018-09-27 ENCOUNTER — HOSPITAL ENCOUNTER (OUTPATIENT)
Facility: MEDICAL CENTER | Age: 38
End: 2018-09-27
Attending: FAMILY MEDICINE
Payer: MEDICAID

## 2018-09-27 VITALS
TEMPERATURE: 97.5 F | WEIGHT: 284 LBS | SYSTOLIC BLOOD PRESSURE: 118 MMHG | OXYGEN SATURATION: 94 % | DIASTOLIC BLOOD PRESSURE: 68 MMHG | HEIGHT: 67 IN | HEART RATE: 109 BPM | RESPIRATION RATE: 20 BRPM | BODY MASS INDEX: 44.57 KG/M2

## 2018-09-27 DIAGNOSIS — E66.01 MORBID OBESITY WITH BMI OF 40.0-44.9, ADULT (HCC): ICD-10-CM

## 2018-09-27 DIAGNOSIS — Z01.419 WELL WOMAN EXAM: ICD-10-CM

## 2018-09-27 DIAGNOSIS — Z12.4 CERVICAL CANCER SCREENING: ICD-10-CM

## 2018-09-27 PROCEDURE — 99213 OFFICE O/P EST LOW 20 MIN: CPT | Performed by: FAMILY MEDICINE

## 2018-09-27 PROCEDURE — G0101 CA SCREEN;PELVIC/BREAST EXAM: HCPCS | Performed by: FAMILY MEDICINE

## 2018-09-27 PROCEDURE — 88175 CYTOPATH C/V AUTO FLUID REDO: CPT

## 2018-09-27 PROCEDURE — 87624 HPV HI-RISK TYP POOLED RSLT: CPT

## 2018-09-27 NOTE — PROGRESS NOTES
Chief Complaint:   Chief Complaint   Patient presents with   • Gynecologic Exam       HPI:Established patient   Tracy Calderon is a 38 y.o. female who presents for well woman exam        Well women /Cervical cancer screening  Last Couple of years ago, not sexually active, denies abnormal vaginal bleeding or discharge or pelvic pain. No history of abnormal Pap     Morbid obesity with BMI of 40.0-44.9, adult ([facility])     Patient is in the process of doing weight loss surgery, has been following up with me every month to monitor her weight, exercise and diet, activities, patient on low carb diet, high protein, tries to exercise by walking and swimming, and most recently she started aerobic exercise        Past medical history, family history, social history and medications reviewed and updated in the record. Today   Current medications, problem list and allergies reviewed in [facility] today   Health maintenance topics are reviewed and updated. Today     Patient Active Problem List    Diagnosis Date Noted   • Impaired glucose tolerance 01/02/2018   • Morbid obesity with BMI of 45.0-49.9, adult ([facility]) 11/30/2017   • Chronic midline low back pain 10/10/2017   • Obesity hypoventilation syndrome ([facility]) 09/06/2017   • Binge eating disorder 07/06/2017   • Morbid obesity with BMI of 40.0-44.9, adult ([facility]) 03/07/2017   • Nocturnal sleep-related eating disorder 08/18/2015   • Bilateral edema of lower extremity 08/10/2015   • S/P nasal septoplasty 06/16/2015   • Obstructive lung disease (generalized) ([facility]) 02/17/2015   • Anxiety 02/17/2015   • History of substance abuse 02/17/2015   • Hyperlipidemia 02/17/2015   • GERD (gastroesophageal reflux disease) 07/18/2013   • Tobacco abuse 07/18/2013   • Chest wall pain 07/18/2013     Family History   Problem Relation Age of Onset   • Lung Disease Mother    • Stroke Mother    • Heart Disease Maternal Grandfather    • Cancer Neg Hx    • Diabetes Neg Hx      Social History     Social History    • Marital status: Single     Spouse name: N/A   • Number of children: N/A   • Years of education: N/A     Occupational History   • Not on file.     Social History Main Topics   • Smoking status: Current Every Day Smoker     Packs/day: 0.50     Years: 15.00     Types: Cigarettes   • Smokeless tobacco: Never Used   • Alcohol use No      Comment: h/o heavy drinking x 16 years. quit drinking in May 2013   • Drug use: Yes     Types: Marijuana, Inhaled      Comment: history of meth use, quit 2007, marijuana daily   • Sexual activity: Not Currently     Partners: Male     Other Topics Concern   • Not on file     Social History Narrative   • No narrative on file     Current Outpatient Prescriptions   Medication Sig Dispense Refill   • raNITidine (ZANTAC) 150 MG Tab TAKE 1 TABLET BY MOUTH TWICE DAILY WITH MEALS 60 Tab 1   • clotrimazole-betamethasone (LOTRISONE) 1-0.05 % Cream Apply 1 Application to affected area(s) 2 times a day. 1 Tube 1   • atorvastatin (LIPITOR) 40 MG Tab TAKE 1 TABLET BY MOUTH ONCE DAILY IN THE EVENING 30 Tab 3   • albuterol (VENTOLIN HFA) 108 (90 Base) MCG/ACT Aero Soln inhalation aerosol INHALE TWO PUFFS BY MOUTH EVERY 6 HOURS AS NEEDED FOR SHORTNESS OF BREATH 1 Inhaler 3   • lactulose 10 GM/15ML Solution Take 30 mL by mouth 2 times a day. 1 Bottle 1   • docusate sodium (COLACE) 100 MG Cap Take 1 Cap by mouth 2 times a day. 60 Cap 6   • furosemide (LASIX) 20 MG Tab TAKE 1 TABLET BY MOUTH ONCE DAILY 30 Tab 5   • albuterol (PROVENTIL) 2.5mg/3ml Nebu Soln solution for nebulization USE ONE VIAL IN NEBULIZER EVERY 4 HOURS AS NEEDED FOR SHORTNESS OF BREATH 360 mL 3   • clonazepam (KLONOPIN) 0.5 MG Tab      • prazosin (MINIPRESS) 5 MG Cap        No current facility-administered medications for this visit.          Review Of Systems  As documented in HPI above  PHYSICAL EXAMINATION:    /68 (BP Location: Left arm, Patient Position: Sitting, BP Cuff Size: Large adult)   Pulse (!) 109   Temp 36.4 °C  "(97.5 °F) (Temporal)   Resp 20   Ht 1.689 m (5' 6.5\")   Wt (!) 128.8 kg (284 lb)   LMP 08/27/2018 (Approximate)   SpO2 94%   Breastfeeding? No   BMI 45.15 kg/m²   Gen.: Well-developed, well-nourished, no apparent distress, pleasant and cooperative with the examination  HEENT: Normocephalic/atraumatic, sinuses nontender with palpation, TMs clear, nares patent with pink mucosa and clear rhinorrhea, oropharynx clear  Neck: No JVD or bruits, no adenopathy  Cor: Regular rate and rhythm without murmur gallop or rub  Lungs: Clear to auscultation with equal breath sounds bilaterally. No wheezes, rhonchi.  Abdomen: Soft nontender without hepatosplenomegaly or masses appreciated, normoactive bowel sounds  Extremities: No cyanosis, clubbing or edema     Normal Vulval and Vaginal stenosis. Suspicious lesion or rash  Cx, posterior cervix. Pap done today. No bleeding or abnormal discharge  Uterus difficult to assess the size of the uterus because of patient body habitus  Adnexa no masses or tenderness felt. Difficult assessment. Because of the patient body habitus  No masses no lesions   Pap done   Bilateral breast exam no masses or nipple discharge or lymphadenopathy bilaterally    ASSESSMENT/Plan:  1. Well woman exam  Bilateral breast exam within normal limits    2. Cervical cancer screening  Pap done today    3. Morbid obesity with BMI of 40.0-44.9, adult ([facility])  Fill the paperwork for her bariatric workup counseled for healthy diet, low-calorie diet, low carb, high protein and exercise.      Please note that this dictation was created using voice recognition software. I have made every reasonable attempt to correct obvious errors but there may be errors of grammar and content that I may have overlooked prior to finalization of this note.       "

## 2018-09-28 DIAGNOSIS — Z12.4 CERVICAL CANCER SCREENING: ICD-10-CM

## 2018-09-28 DIAGNOSIS — Z01.419 WELL WOMAN EXAM: ICD-10-CM

## 2018-10-02 LAB
CYTOLOGY REG CYTOL: NORMAL
HPV HR 12 DNA CVX QL NAA+PROBE: NEGATIVE
HPV16 DNA SPEC QL NAA+PROBE: NEGATIVE
HPV18 DNA SPEC QL NAA+PROBE: NEGATIVE
SPECIMEN SOURCE: NORMAL

## 2018-10-05 ENCOUNTER — TELEPHONE (OUTPATIENT)
Dept: MEDICAL GROUP | Facility: MEDICAL CENTER | Age: 38
End: 2018-10-05

## 2018-10-05 NOTE — TELEPHONE ENCOUNTER
----- Message from Sonja Lin M.D. sent at 10/5/2018 12:06 PM PDT -----  Please Inform patient PAP result WNL .  Thank you

## 2018-11-02 RX ORDER — ALBUTEROL SULFATE 2.5 MG/3ML
2.5 SOLUTION RESPIRATORY (INHALATION) EVERY 4 HOURS PRN
Qty: 360 ML | Refills: 3 | Status: SHIPPED | OUTPATIENT
Start: 2018-11-02 | End: 2021-05-26 | Stop reason: SDUPTHER

## 2018-11-05 ENCOUNTER — OFFICE VISIT (OUTPATIENT)
Dept: MEDICAL GROUP | Facility: MEDICAL CENTER | Age: 38
End: 2018-11-05
Attending: FAMILY MEDICINE
Payer: MEDICAID

## 2018-11-05 VITALS
WEIGHT: 276 LBS | DIASTOLIC BLOOD PRESSURE: 76 MMHG | SYSTOLIC BLOOD PRESSURE: 112 MMHG | TEMPERATURE: 98.2 F | OXYGEN SATURATION: 88 % | BODY MASS INDEX: 44.36 KG/M2 | HEART RATE: 84 BPM | RESPIRATION RATE: 16 BRPM | HEIGHT: 66 IN

## 2018-11-05 DIAGNOSIS — K04.7 DENTAL ABSCESS: ICD-10-CM

## 2018-11-05 PROCEDURE — 99213 OFFICE O/P EST LOW 20 MIN: CPT | Performed by: FAMILY MEDICINE

## 2018-11-05 PROCEDURE — 99212 OFFICE O/P EST SF 10 MIN: CPT | Performed by: FAMILY MEDICINE

## 2018-11-05 RX ORDER — AZITHROMYCIN 250 MG/1
TABLET, FILM COATED ORAL
Qty: 8 TAB | Refills: 0 | Status: SHIPPED | OUTPATIENT
Start: 2018-11-05 | End: 2019-01-11

## 2018-11-05 ASSESSMENT — PAIN SCALES - GENERAL: PAINLEVEL: 4=SLIGHT-MODERATE PAIN

## 2018-11-05 NOTE — PROGRESS NOTES
"Subjective:      Tracy Calderon is a 38 y.o. female who presents with Dental Pain (abscess)            HPI 1.  Dental abscess-patient reported onset last week of severe bilateral headache.  Then on Saturday 2 days ago she had 2 separate dental gum abscesses rupture 1 in a gap in her anterior lower front teeth and one on the lateral aspect of her right lower jaw.  She reported immediate relief from her headache with this event.  She has not noted any fever.  She feels like those areas are still slightly swollen today.    ROS negative for sore throat, dyspnea, chest pain       Objective:     /76 (BP Location: Left arm, Patient Position: Sitting, BP Cuff Size: Large adult)   Pulse 84   Temp 36.8 °C (98.2 °F) (Temporal)   Resp 16   Ht 1.689 m (5' 6.5\")   Wt (!) 125.2 kg (276 lb)   SpO2 88%   BMI 43.89 kg/m²      Physical Exam  Gen.- alert, cooperative, in no acute distress  Neck- midline trachea, thyroid not enlarged or tender,supple, no cervical adenopathy  Chest-clear to auscultation and percussion with normal breath sounds. No retractions. Chest wall nontender  Cardiac- regular rhythm and rate. No murmur, thrill, or heave  Oropharynx-approximate 40% of patients teeth have been extracted in the past.  She has a bridge in front.  1 of the abscess sites had formed underneath that removable bridge.  The other was to the posterior lateral aspect of the right lower jaw.  No obvious reaccumulation of purulent material is visualized this morning.  Tongue is midline.  Mucosa is moist          Assessment/Plan:     1. Dental abscess  Plan:  1.  Patient is given a updated list of dental providers that are compatible with her insurance  2.  Cover with Zithromax times 8 days    "

## 2018-11-13 ENCOUNTER — OFFICE VISIT (OUTPATIENT)
Dept: MEDICAL GROUP | Facility: MEDICAL CENTER | Age: 38
End: 2018-11-13
Attending: FAMILY MEDICINE
Payer: MEDICAID

## 2018-11-13 VITALS
BODY MASS INDEX: 43.87 KG/M2 | HEART RATE: 98 BPM | RESPIRATION RATE: 18 BRPM | TEMPERATURE: 97.6 F | DIASTOLIC BLOOD PRESSURE: 74 MMHG | SYSTOLIC BLOOD PRESSURE: 130 MMHG | WEIGHT: 273 LBS | OXYGEN SATURATION: 93 % | HEIGHT: 66 IN

## 2018-11-13 DIAGNOSIS — R10.11 RIGHT UPPER QUADRANT ABDOMINAL PAIN: ICD-10-CM

## 2018-11-13 DIAGNOSIS — R52 GENERALIZED BODY ACHES: ICD-10-CM

## 2018-11-13 DIAGNOSIS — R05.9 COUGH: ICD-10-CM

## 2018-11-13 PROCEDURE — 99213 OFFICE O/P EST LOW 20 MIN: CPT | Performed by: FAMILY MEDICINE

## 2018-11-13 PROCEDURE — 99214 OFFICE O/P EST MOD 30 MIN: CPT | Performed by: FAMILY MEDICINE

## 2018-11-13 RX ORDER — METHYLPREDNISOLONE 4 MG/1
4 TABLET ORAL DAILY
Qty: 1 KIT | Refills: 0 | Status: SHIPPED | OUTPATIENT
Start: 2018-11-13 | End: 2019-01-11

## 2018-11-13 NOTE — PROGRESS NOTES
Chief Complaint:   Chief Complaint   Patient presents with   • Gallbladder     is having pain for almost 2 mo    • Cough     coughing up mucus   • Generalized Body Aches     3 weeks        HPI:established patient  Tracy Calderon is a 38 y.o. female who presents for follow-up and evaluation of cough and upper abdominal pain    Cough / Generalized body aches  Patient reports that for the past 3 weeks she has been having generalized body ache, cough, cough is productive with sometimes change in the color of sputum to yellowish, recently completed course of Z-Jose Antonio that was given to her by another provider for dental infection.  She said she feels better but the cough is not completely resolved because it is associated with some chest congestion and sometimes difficulty in breathing is been using her inhalers without resolution of the symptoms she denied chest pain or palpitations, patient vital signs are stable at the office today, reports no fever or GI symptoms    Right upper quadrant abdominal pain  Patient reports that for the past couple of weeks she has been noticing right upper quadrant pain, she is concerned because in the past she was told that she has gallbladder stones without gallbladder infection she was directed to watch for pain, she said she does not have any nausea or vomiting, no fever no dizziness.  No diarrhea or constipation          Past medical history, family history, social history and medications reviewed and updated in the record. today  Current medications, problem list and allergies reviewed in [facility] today  Health maintenance topics are reviewed and updated. today    Patient Active Problem List    Diagnosis Date Noted   • Impaired glucose tolerance 01/02/2018   • Morbid obesity with BMI of 45.0-49.9, adult ([facility]) 11/30/2017   • Chronic midline low back pain 10/10/2017   • Obesity hypoventilation syndrome ([facility]) 09/06/2017   • Binge eating disorder 07/06/2017   • Morbid obesity with BMI  of 40.0-44.9, adult ([facility]) 03/07/2017   • Nocturnal sleep-related eating disorder 08/18/2015   • Bilateral edema of lower extremity 08/10/2015   • S/P nasal septoplasty 06/16/2015   • Obstructive lung disease (generalized) ([facility]) 02/17/2015   • Anxiety 02/17/2015   • History of substance abuse 02/17/2015   • Hyperlipidemia 02/17/2015   • GERD (gastroesophageal reflux disease) 07/18/2013   • Tobacco abuse 07/18/2013   • Chest wall pain 07/18/2013     Family History   Problem Relation Age of Onset   • Lung Disease Mother    • Stroke Mother    • Heart Disease Maternal Grandfather    • Cancer Neg Hx    • Diabetes Neg Hx      Social History     Social History   • Marital status: Single     Spouse name: N/A   • Number of children: N/A   • Years of education: N/A     Occupational History   • Not on file.     Social History Main Topics   • Smoking status: Current Every Day Smoker     Packs/day: 0.50     Years: 15.00     Types: Cigarettes   • Smokeless tobacco: Never Used   • Alcohol use No      Comment: h/o heavy drinking x 16 years. quit drinking in May 2013   • Drug use: Yes     Types: Marijuana, Inhaled      Comment: history of meth use, quit 2007, marijuana daily   • Sexual activity: Not Currently     Partners: Male     Other Topics Concern   • Not on file     Social History Narrative   • No narrative on file       Current Outpatient Prescriptions   Medication Sig Dispense Refill   • MethylPREDNISolone (MEDROL DOSEPAK) 4 MG Tablet Therapy Pack Take 1 Tab by mouth every day. 1 Kit 0   • azithromycin (ZITHROMAX) 250 MG Tab 2 tablets p.o. on day 1 then 1 p.o. daily until gone 8 Tab 0   • albuterol (PROVENTIL) 2.5mg/3ml Nebu Soln solution for nebulization 3 mL by Nebulization route every four hours as needed. AS NEEDED FOR SHORTNESS OF BREATH 360 mL 3   • atorvastatin (LIPITOR) 40 MG Tab TAKE 1 TABLET BY MOUTH ONCE DAILY IN THE EVENING 30 Tab 6   • clotrimazole-betamethasone (LOTRISONE) 1-0.05 % Cream APPLY 1 APPLICATION OF  "CREAM TOPICALLY TO AFFECTED AREA TWICE DAILY 15 g 0   • furosemide (LASIX) 20 MG Tab TAKE 1 TABLET BY MOUTH ONCE DAILY 30 Tab 0   • raNITidine (ZANTAC) 150 MG Tab TAKE 1 TABLET BY MOUTH TWICE DAILY WITH MEALS 60 Tab 1   • albuterol (VENTOLIN HFA) 108 (90 Base) MCG/ACT Aero Soln inhalation aerosol INHALE TWO PUFFS BY MOUTH EVERY 6 HOURS AS NEEDED FOR SHORTNESS OF BREATH 1 Inhaler 3   • lactulose 10 GM/15ML Solution Take 30 mL by mouth 2 times a day. 1 Bottle 1   • docusate sodium (COLACE) 100 MG Cap Take 1 Cap by mouth 2 times a day. 60 Cap 6   • clonazepam (KLONOPIN) 0.5 MG Tab      • prazosin (MINIPRESS) 5 MG Cap        No current facility-administered medications for this visit.          Review Of Systems  As documented in HPI above  PHYSICAL EXAMINATION:    /74 (BP Location: Right arm, Patient Position: Sitting, BP Cuff Size: Adult long)   Pulse 98   Temp 36.4 °C (97.6 °F) (Temporal)   Resp 18   Ht 1.676 m (5' 6\")   Wt 123.8 kg (273 lb)   LMP 10/08/2018   SpO2 93%   BMI 44.06 kg/m²   Gen.: Well-developed, well-nourished, no apparent distress, pleasant and cooperative with the examination  HEENT: Normocephalic/atraumatic, sinuses nontender with palpation, TMs clear, nares patent with pink mucosa and clear rhinorrhea, oropharynx clear  Neck: No JVD or bruits, no adenopathy  Cor: Regular rate and rhythm without murmur gallop or rub  Lungs: Clear to auscultation with equal breath sounds bilaterally.  Noted evidence of pain all over her chest.  Abdomen: Soft nontender without hepatosplenomegaly or masses appreciated, normoactive bowel sounds, negative Luz sign no rebound tenderness.  Extremities: No cyanosis, clubbing or edema          ASSESSMENT/Plan:    1. Cough    Possibly related to bronchitis versus COPD exacerbation recently completed a course of Z-Jose Antonio I am not going to repeat another antibiotics treatment but patient on Medrol Dosepak advised to continue the use of albuterol inhaler every 6 " hours, if symptoms are not resolving patient to come back for reassessment strongly counseled on smoking cessation patient is not ready at this time she is that she will try to cut back    MethylPREDNISolone (MEDROL DOSEPAK) 4 MG Tablet Therapy Pack   2. Generalized body aches   possibly related to bronchitis or the viral infection advised to increase water intake and fluid intake and supportive care at this time   3. Right upper quadrant abdominal pain    I will order an ultrasound workup to rule out cholecystitis advised if she develops any severe abdominal pain or vomiting to report emergency room.  No red flags at this time    US-RUQ    CBC WITH DIFFERENTIAL    COMP METABOLIC PANEL    LIPASE       Please note that this dictation was created using voice recognition software. I have made every reasonable attempt to correct obvious errors but there may be errors of grammar and content that I may have overlooked prior to finalization of this note.

## 2018-11-19 ENCOUNTER — HOSPITAL ENCOUNTER (OUTPATIENT)
Dept: LAB | Facility: MEDICAL CENTER | Age: 38
End: 2018-11-19
Attending: FAMILY MEDICINE
Payer: MEDICAID

## 2018-11-19 DIAGNOSIS — R10.11 RIGHT UPPER QUADRANT ABDOMINAL PAIN: ICD-10-CM

## 2018-11-19 LAB
ALBUMIN SERPL BCP-MCNC: 4.1 G/DL (ref 3.2–4.9)
ALBUMIN/GLOB SERPL: 1.4 G/DL
ALP SERPL-CCNC: 72 U/L (ref 30–99)
ALT SERPL-CCNC: 15 U/L (ref 2–50)
ANION GAP SERPL CALC-SCNC: 4 MMOL/L (ref 0–11.9)
AST SERPL-CCNC: 14 U/L (ref 12–45)
BASOPHILS # BLD AUTO: 0.8 % (ref 0–1.8)
BASOPHILS # BLD: 0.08 K/UL (ref 0–0.12)
BILIRUB SERPL-MCNC: 0.5 MG/DL (ref 0.1–1.5)
BUN SERPL-MCNC: 22 MG/DL (ref 8–22)
CALCIUM SERPL-MCNC: 9.5 MG/DL (ref 8.5–10.5)
CHLORIDE SERPL-SCNC: 102 MMOL/L (ref 96–112)
CO2 SERPL-SCNC: 32 MMOL/L (ref 20–33)
CREAT SERPL-MCNC: 0.97 MG/DL (ref 0.5–1.4)
EOSINOPHIL # BLD AUTO: 0.17 K/UL (ref 0–0.51)
EOSINOPHIL NFR BLD: 1.8 % (ref 0–6.9)
ERYTHROCYTE [DISTWIDTH] IN BLOOD BY AUTOMATED COUNT: 53 FL (ref 35.9–50)
FASTING STATUS PATIENT QL REPORTED: NORMAL
GLOBULIN SER CALC-MCNC: 2.9 G/DL (ref 1.9–3.5)
GLUCOSE SERPL-MCNC: 87 MG/DL (ref 65–99)
HCT VFR BLD AUTO: 53.5 % (ref 37–47)
HGB BLD-MCNC: 16.9 G/DL (ref 12–16)
IMM GRANULOCYTES # BLD AUTO: 0.02 K/UL (ref 0–0.11)
IMM GRANULOCYTES NFR BLD AUTO: 0.2 % (ref 0–0.9)
LIPASE SERPL-CCNC: 25 U/L (ref 11–82)
LYMPHOCYTES # BLD AUTO: 3.91 K/UL (ref 1–4.8)
LYMPHOCYTES NFR BLD: 40.7 % (ref 22–41)
MCH RBC QN AUTO: 31.9 PG (ref 27–33)
MCHC RBC AUTO-ENTMCNC: 31.6 G/DL (ref 33.6–35)
MCV RBC AUTO: 100.9 FL (ref 81.4–97.8)
MONOCYTES # BLD AUTO: 0.67 K/UL (ref 0–0.85)
MONOCYTES NFR BLD AUTO: 7 % (ref 0–13.4)
NEUTROPHILS # BLD AUTO: 4.76 K/UL (ref 2–7.15)
NEUTROPHILS NFR BLD: 49.5 % (ref 44–72)
NRBC # BLD AUTO: 0 K/UL
NRBC BLD-RTO: 0 /100 WBC
PLATELET # BLD AUTO: 339 K/UL (ref 164–446)
PMV BLD AUTO: 10.7 FL (ref 9–12.9)
POTASSIUM SERPL-SCNC: 4.3 MMOL/L (ref 3.6–5.5)
PROT SERPL-MCNC: 7 G/DL (ref 6–8.2)
RBC # BLD AUTO: 5.3 M/UL (ref 4.2–5.4)
SODIUM SERPL-SCNC: 138 MMOL/L (ref 135–145)
WBC # BLD AUTO: 9.6 K/UL (ref 4.8–10.8)

## 2018-11-19 PROCEDURE — 80053 COMPREHEN METABOLIC PANEL: CPT

## 2018-11-19 PROCEDURE — 85025 COMPLETE CBC W/AUTO DIFF WBC: CPT

## 2018-11-19 PROCEDURE — 36415 COLL VENOUS BLD VENIPUNCTURE: CPT

## 2018-11-19 PROCEDURE — 83690 ASSAY OF LIPASE: CPT

## 2018-11-21 ENCOUNTER — APPOINTMENT (OUTPATIENT)
Dept: RADIOLOGY | Facility: MEDICAL CENTER | Age: 38
End: 2018-11-21
Attending: FAMILY MEDICINE
Payer: MEDICAID

## 2018-11-30 ENCOUNTER — HOSPITAL ENCOUNTER (OUTPATIENT)
Dept: RADIOLOGY | Facility: MEDICAL CENTER | Age: 38
End: 2018-11-30
Attending: FAMILY MEDICINE
Payer: MEDICAID

## 2018-11-30 DIAGNOSIS — R10.11 RIGHT UPPER QUADRANT ABDOMINAL PAIN: ICD-10-CM

## 2018-11-30 PROCEDURE — 76705 ECHO EXAM OF ABDOMEN: CPT

## 2018-12-04 ENCOUNTER — TELEPHONE (OUTPATIENT)
Dept: MEDICAL GROUP | Facility: MEDICAL CENTER | Age: 38
End: 2018-12-04

## 2018-12-05 NOTE — TELEPHONE ENCOUNTER
----- Message from Sonja Lin M.D. sent at 12/4/2018  3:36 PM PST -----  Notify patient about ultrasound results of the abdomen, evidence of left lobe liver mass that is consistent with cavernous hemangioma, this is usually benign vascular mass, no further action at this time.  We will do watchful waiting.  There is no change in the gallbladder polyp that was found earlier no other new abnormalities identified.  Patient to be reassured if any pain or discomfort to come back for reassessment and further evaluation.  Thank you

## 2018-12-05 NOTE — TELEPHONE ENCOUNTER
Phone Number Called: [phone] (home)     Message: Called pt. Left message to call back regarding lab results.    Left Message for patient to call back: yes

## 2018-12-05 NOTE — TELEPHONE ENCOUNTER
Phone Number Called: [phone] (home)     Message: Unable to leave a message. VM has not been set up.    Left Message for patient to call back: No

## 2018-12-06 ENCOUNTER — TELEPHONE (OUTPATIENT)
Dept: MEDICAL GROUP | Facility: MEDICAL CENTER | Age: 38
End: 2018-12-06

## 2018-12-06 NOTE — TELEPHONE ENCOUNTER
Pt lvm asking for lab results and US results. I called pt and she informed me that she never received a phone call or a vm about lab results or US results, I went over all with her and she verbalized understanding of all results

## 2018-12-11 RX ORDER — RANITIDINE 150 MG/1
150 TABLET ORAL
Qty: 30 TAB | Refills: 3 | Status: SHIPPED | OUTPATIENT
Start: 2018-12-11 | End: 2019-04-18 | Stop reason: SDUPTHER

## 2018-12-11 RX ORDER — FUROSEMIDE 20 MG/1
TABLET ORAL
Qty: 30 TAB | Refills: 3 | Status: SHIPPED | OUTPATIENT
Start: 2018-12-11 | End: 2019-01-11

## 2019-01-11 ENCOUNTER — OFFICE VISIT (OUTPATIENT)
Dept: MEDICAL GROUP | Facility: MEDICAL CENTER | Age: 39
End: 2019-01-11
Attending: FAMILY MEDICINE
Payer: MEDICAID

## 2019-01-11 VITALS
DIASTOLIC BLOOD PRESSURE: 90 MMHG | RESPIRATION RATE: 20 BRPM | TEMPERATURE: 98.5 F | WEIGHT: 270 LBS | SYSTOLIC BLOOD PRESSURE: 128 MMHG | OXYGEN SATURATION: 93 % | HEART RATE: 100 BPM | HEIGHT: 66 IN | BODY MASS INDEX: 43.39 KG/M2

## 2019-01-11 DIAGNOSIS — D18.03 HEMANGIOMA OF LIVER: ICD-10-CM

## 2019-01-11 DIAGNOSIS — F17.200 SMOKER: ICD-10-CM

## 2019-01-11 DIAGNOSIS — Z23 FLU VACCINE NEED: ICD-10-CM

## 2019-01-11 DIAGNOSIS — L08.9 SKIN INFECTION: ICD-10-CM

## 2019-01-11 DIAGNOSIS — R09.81 NASAL CONGESTION: ICD-10-CM

## 2019-01-11 PROCEDURE — 99213 OFFICE O/P EST LOW 20 MIN: CPT | Mod: 25 | Performed by: FAMILY MEDICINE

## 2019-01-11 PROCEDURE — 99214 OFFICE O/P EST MOD 30 MIN: CPT | Performed by: FAMILY MEDICINE

## 2019-01-11 PROCEDURE — 90686 IIV4 VACC NO PRSV 0.5 ML IM: CPT

## 2019-01-11 RX ORDER — NICOTINE 21 MG/24HR
1 PATCH, TRANSDERMAL 24 HOURS TRANSDERMAL EVERY 24 HOURS
Qty: 30 PATCH | Refills: 1 | Status: SHIPPED | OUTPATIENT
Start: 2019-01-11 | End: 2019-04-08

## 2019-01-11 RX ORDER — CETIRIZINE HYDROCHLORIDE 10 MG/1
10 TABLET, CHEWABLE ORAL DAILY
Qty: 30 TAB | Refills: 0 | Status: SHIPPED | OUTPATIENT
Start: 2019-01-11 | End: 2019-04-08

## 2019-01-11 RX ORDER — CEPHALEXIN 250 MG/1
250 CAPSULE ORAL 3 TIMES DAILY
Qty: 21 CAP | Refills: 0 | Status: SHIPPED | OUTPATIENT
Start: 2019-01-11 | End: 2019-03-04

## 2019-01-11 RX ORDER — TRIAMCINOLONE ACETONIDE 55 UG/1
2 SPRAY, METERED NASAL DAILY
Qty: 1 BOTTLE | Refills: 0 | Status: SHIPPED | OUTPATIENT
Start: 2019-01-11 | End: 2019-11-27

## 2019-01-11 RX ORDER — CITALOPRAM 40 MG/1
40 TABLET ORAL EVERY MORNING
COMMUNITY
Start: 2019-01-09 | End: 2023-11-26

## 2019-01-11 ASSESSMENT — PATIENT HEALTH QUESTIONNAIRE - PHQ9: CLINICAL INTERPRETATION OF PHQ2 SCORE: 0

## 2019-01-11 ASSESSMENT — PAIN SCALES - GENERAL: PAINLEVEL: NO PAIN

## 2019-01-11 NOTE — PROGRESS NOTES
Chief Complaint:   Chief Complaint   Patient presents with   • Results   • Sinus Problem   • Other     MTM form    • Nicotine Dependence     ready to quit        HPI:Established patient   Tracy Calderon is a 38 y.o. female who presents for follow-up discuss the following concerns as follows today:     Nasal congestion    Patient has chronic nasal congestion related to deviated nasal septum and she has history of surgical intervention to correct the nasal septum deviation in the past, she said for the past few months she has been experiencing more nasal congestion, has been using over-the-counter remedies and is not helping much denies symptoms suggestive of infection like headache or fever or purulent nasal discharge.   Flu vaccine need  Needs flu vaccine today denies flulike symptoms or fever or acute illness     Smoker  Patient states she is ready to quit, usually she smokes 1 pack/day, Wellbutrin gave her side effects that she does not want to try it again Chantix was stopped because patient developed nightmares and sleep disturbance     Skin infection  Patient with history of chronic multiple recurrent skin infections and folliculitis, she reports today that she has some pimples that she thinks is infected in her abdominal area.  No fever      Us results discussed     Reviewed lab work results today discussed with the patient elevated hemoglobin level above normal around 16 and ultrasound of the liver shows evidence of possible cavernous hemangioma of the liver.  Patient is asymptomatic at this time denies abdominal pain    Past medical history, family history, social history and medications reviewed and updated in the record.  Today   Current medications, problem list and allergies reviewed in [facility] today   Health maintenance topics are reviewed and updated. Today     Patient Active Problem List    Diagnosis Date Noted   • Impaired glucose tolerance 01/02/2018   • Morbid obesity with BMI of 45.0-49.9,  adult ([facility]) 11/30/2017   • Chronic midline low back pain 10/10/2017   • Obesity hypoventilation syndrome ([facility]) 09/06/2017   • Binge eating disorder 07/06/2017   • Morbid obesity with BMI of 40.0-44.9, adult ([facility]) 03/07/2017   • Nocturnal sleep-related eating disorder 08/18/2015   • Bilateral edema of lower extremity 08/10/2015   • S/P nasal septoplasty 06/16/2015   • Obstructive lung disease (generalized) ([facility]) 02/17/2015   • Anxiety 02/17/2015   • History of substance abuse 02/17/2015   • Hyperlipidemia 02/17/2015   • GERD (gastroesophageal reflux disease) 07/18/2013   • Tobacco abuse 07/18/2013   • Chest wall pain 07/18/2013     Family History   Problem Relation Age of Onset   • Lung Disease Mother    • Stroke Mother    • Heart Disease Maternal Grandfather    • Cancer Neg Hx    • Diabetes Neg Hx      Social History     Social History   • Marital status: Single     Spouse name: N/A   • Number of children: N/A   • Years of education: N/A     Occupational History   • Not on file.     Social History Main Topics   • Smoking status: Current Every Day Smoker     Packs/day: 0.50     Years: 15.00     Types: Cigarettes   • Smokeless tobacco: Never Used   • Alcohol use No      Comment: h/o heavy drinking x 16 years. quit drinking in May 2013   • Drug use: Yes     Types: Marijuana, Inhaled      Comment: history of meth use, quit 2007, marijuana daily   • Sexual activity: Not Currently     Partners: Male     Other Topics Concern   • Not on file     Social History Narrative   • No narrative on file     Current Outpatient Prescriptions   Medication Sig Dispense Refill   • citalopram (CELEXA) 40 MG Tab      • triamcinolone (NASACORT) 55 MCG/ACT nasal inhaler Spray 2 Sprays in nose every day. 1 Bottle 0   • cetirizine (ZYRTEC) 10 MG chewable tablet Take 1 Tab by mouth every day. 30 Tab 0   • nicotine (NICODERM) 21 MG/24HR PATCH 24 HR Apply 1 Patch to skin as directed every 24 hours. 30 Patch 1   • cephALEXin (KEFLEX) 250 MG Cap  "Take 1 Cap by mouth 3 times a day. 21 Cap 0   • mupirocin (BACTROBAN) 2 % Ointment Apply 1 Application to affected area(s) 2 times a day. 1 Tube 0   • raNITidine (ZANTAC) 150 MG Tab Take 1 Tab by mouth 1 time daily as needed for Heartburn. 30 Tab 3   • atorvastatin (LIPITOR) 40 MG Tab TAKE 1 TABLET BY MOUTH ONCE DAILY IN THE EVENING 30 Tab 6   • albuterol (VENTOLIN HFA) 108 (90 Base) MCG/ACT Aero Soln inhalation aerosol INHALE TWO PUFFS BY MOUTH EVERY 6 HOURS AS NEEDED FOR SHORTNESS OF BREATH 1 Inhaler 3   • clonazepam (KLONOPIN) 0.5 MG Tab Take 0.5 mg by mouth 3 times a day.     • prazosin (MINIPRESS) 5 MG Cap Take 10 mg by mouth every evening.     • clotrimazole-betamethasone (LOTRISONE) 1-0.05 % Cream APPLY  CREAM TOPICALLY TO AFFECTED AREA TWICE DAILY (Patient not taking: Reported on 1/11/2019) 1 Tube 1   • albuterol (PROVENTIL) 2.5mg/3ml Nebu Soln solution for nebulization 3 mL by Nebulization route every four hours as needed. AS NEEDED FOR SHORTNESS OF BREATH 360 mL 3   • lactulose 10 GM/15ML Solution Take 30 mL by mouth 2 times a day. (Patient not taking: Reported on 1/11/2019) 1 Bottle 1   • docusate sodium (COLACE) 100 MG Cap Take 1 Cap by mouth 2 times a day. (Patient not taking: Reported on 1/11/2019) 60 Cap 6     No current facility-administered medications for this visit.            Review Of Systems  As documented in HPI above  PHYSICAL EXAMINATION:    /90 (BP Location: Left arm, Patient Position: Sitting, BP Cuff Size: Adult)   Pulse 100   Temp 36.9 °C (98.5 °F) (Temporal)   Resp 20   Ht 1.676 m (5' 5.98\")   Wt 122.5 kg (270 lb)   LMP 01/05/2019   SpO2 93%   BMI 43.60 kg/m²   Gen.: Well-developed, well-nourished, morbidly obese, no apparent distress, pleasant and cooperative with the examination  HEENT: Normocephalic/atraumatic, sinuses nontender with palpation, nasal passages with redness and erythema indicating possible allergic reaction, TMs clear, nares patent with pink mucosa and " clear rhinorrhea, oropharynx clear  Neck: No JVD or bruits, no adenopathy  Cor: Regular rate and rhythm without murmur gallop or rub  Lungs: Clear to auscultation with equal breath sounds bilaterally.  Noted mild rhonchi scattered all over the chest.   Extremities: No cyanosis, clubbing or edema          ASSESSMENT/Plan:  1. Nasal congestion   likely allergic in origin patient with history of septal deviation corrected surgically, advised to use nasal spray and Zyrtec as directed if not improved I will add Singulair and send patient to see an ENT specialist    triamcinolone (NASACORT) 55 MCG/ACT nasal inhaler    cetirizine (ZYRTEC) 10 MG chewable tablet   2. Flu vaccine need  Flu Quad Inj >3 Year Pre-Filled PF   3. Smoker   strongly counseled for smoking cessation patient is ready to quit we will start with nicotine patches 21 mg and go down slowly    nicotine (NICODERM) 21 MG/24HR PATCH 24 HR   4. Skin infection   recurrent folliculitis and skin infection discussed importance to keep skin dry and clean,    cephALEXin (KEFLEX) 250 MG Cap    mupirocin (BACTROBAN) 2 % Ointment     MDM form was filled and handed back to patient today.

## 2019-01-15 ENCOUNTER — TELEPHONE (OUTPATIENT)
Dept: MEDICAL GROUP | Facility: MEDICAL CENTER | Age: 39
End: 2019-01-15

## 2019-01-15 DIAGNOSIS — F17.200 SMOKER: ICD-10-CM

## 2019-01-15 NOTE — TELEPHONE ENCOUNTER
Pt would like a referral for smoking cessation, her cb number is [phone] (home) she would like a call once placed

## 2019-01-25 ENCOUNTER — HOSPITAL ENCOUNTER (OUTPATIENT)
Facility: MEDICAL CENTER | Age: 39
End: 2019-01-25
Attending: FAMILY MEDICINE
Payer: MEDICAID

## 2019-01-25 ENCOUNTER — OFFICE VISIT (OUTPATIENT)
Dept: MEDICAL GROUP | Facility: MEDICAL CENTER | Age: 39
End: 2019-01-25
Attending: FAMILY MEDICINE
Payer: MEDICAID

## 2019-01-25 VITALS
WEIGHT: 267 LBS | HEIGHT: 66 IN | HEART RATE: 96 BPM | SYSTOLIC BLOOD PRESSURE: 112 MMHG | TEMPERATURE: 97.8 F | OXYGEN SATURATION: 94 % | BODY MASS INDEX: 42.91 KG/M2 | RESPIRATION RATE: 16 BRPM | DIASTOLIC BLOOD PRESSURE: 62 MMHG

## 2019-01-25 DIAGNOSIS — R39.198 DIFFICULTY URINATING: ICD-10-CM

## 2019-01-25 DIAGNOSIS — J32.9 CHRONIC CONGESTION OF PARANASAL SINUS: ICD-10-CM

## 2019-01-25 DIAGNOSIS — N89.8 ITCHING IN THE VAGINAL AREA: ICD-10-CM

## 2019-01-25 LAB
APPEARANCE UR: CLEAR
BILIRUB UR STRIP-MCNC: NORMAL MG/DL
COLOR UR AUTO: YELLOW
GLUCOSE UR STRIP.AUTO-MCNC: NORMAL MG/DL
KETONES UR STRIP.AUTO-MCNC: NORMAL MG/DL
LEUKOCYTE ESTERASE UR QL STRIP.AUTO: NORMAL
NITRITE UR QL STRIP.AUTO: NORMAL
PH UR STRIP.AUTO: 5.5 [PH] (ref 5–8)
PROT UR QL STRIP: NORMAL MG/DL
RBC UR QL AUTO: NORMAL
SP GR UR STRIP.AUTO: 1.02
UROBILINOGEN UR STRIP-MCNC: 0.2 MG/DL

## 2019-01-25 PROCEDURE — 87086 URINE CULTURE/COLONY COUNT: CPT

## 2019-01-25 PROCEDURE — 87186 SC STD MICRODIL/AGAR DIL: CPT

## 2019-01-25 PROCEDURE — 87077 CULTURE AEROBIC IDENTIFY: CPT

## 2019-01-25 PROCEDURE — 81002 URINALYSIS NONAUTO W/O SCOPE: CPT | Performed by: FAMILY MEDICINE

## 2019-01-25 PROCEDURE — 99213 OFFICE O/P EST LOW 20 MIN: CPT | Performed by: FAMILY MEDICINE

## 2019-01-25 PROCEDURE — 99214 OFFICE O/P EST MOD 30 MIN: CPT | Performed by: FAMILY MEDICINE

## 2019-01-25 RX ORDER — FLUCONAZOLE 150 MG/1
150 TABLET ORAL DAILY
Qty: 1 TAB | Refills: 0 | Status: SHIPPED | OUTPATIENT
Start: 2019-01-25 | End: 2019-04-08

## 2019-01-25 NOTE — PROGRESS NOTES
Chief Complaint:   Chief Complaint   Patient presents with   • Other     Difficulty in urination   • Nasal Congestion       HPI:Established patient   Tracy Calderon is a 38 y.o. female who presents for evaluation of possible UTI she said she had itching and discomfort in the genital area.  Discussed also the following concerns today:     Chronic congestion of paranasal sinus  Patient said she is concerned about the chronic right nasal congestion has been using all allergy medications and nasal spray without improvement of her nasal congestion, denies fever or headache or pressure-like symptoms history of surgery related to septal deviation, would like another referral for further evaluation to ENT specialist continues to take Claritin as needed singular every day and nasal spray.    itching in the vaginal area  Reports for the past couple of weeks has been experiencing thick vaginal discharge and vaginal itchiness, recently completed a course of antibiotics for treatment of folliculitis.  Not sexually active denies lower abdominal pain or pelvic discomfort    Difficulty urinating  Patient reports that she has been noticing that when she goes to urinate she has to bend over and she is having difficulty of urination she has to strain before she is able to pass urine.  This is been going on for a couple of months.  Denies flank pain, denies burning sensation with urination or pain with urination denies nausea or vomiting        Past medical history, family history, social history and medications reviewed and updated in the record. Today   Current medications, problem list and allergies reviewed in EPIC today   Health maintenance topics are reviewed and updated. Today     Patient Active Problem List    Diagnosis Date Noted   • Hemangioma of liver 01/11/2019   • Impaired glucose tolerance 01/02/2018   • Morbid obesity with BMI of 45.0-49.9, adult (HCC) 11/30/2017   • Chronic midline low back pain 10/10/2017   •  Obesity hypoventilation syndrome (HCC) 09/06/2017   • Binge eating disorder 07/06/2017   • Morbid obesity with BMI of 40.0-44.9, adult ([facility]) 03/07/2017   • Nocturnal sleep-related eating disorder 08/18/2015   • Bilateral edema of lower extremity 08/10/2015   • S/P nasal septoplasty 06/16/2015   • Anxiety 02/17/2015   • History of substance abuse 02/17/2015   • Hyperlipidemia 02/17/2015   • GERD (gastroesophageal reflux disease) 07/18/2013   • Tobacco abuse 07/18/2013   • Chest wall pain 07/18/2013     Family History   Problem Relation Age of Onset   • Lung Disease Mother    • Stroke Mother    • Heart Disease Maternal Grandfather    • Cancer Neg Hx    • Diabetes Neg Hx      Social History     Social History   • Marital status: Single     Spouse name: N/A   • Number of children: N/A   • Years of education: N/A     Occupational History   • Not on file.     Social History Main Topics   • Smoking status: Current Every Day Smoker     Packs/day: 0.50     Years: 15.00     Types: Cigarettes   • Smokeless tobacco: Never Used   • Alcohol use No      Comment: h/o heavy drinking x 16 years. quit drinking in May 2013   • Drug use: Yes     Types: Marijuana, Inhaled      Comment: history of meth use, quit 2007, marijuana daily   • Sexual activity: Not Currently     Partners: Male     Other Topics Concern   • Not on file     Social History Narrative   • No narrative on file     Current Outpatient Prescriptions   Medication Sig Dispense Refill   • fluconazole (DIFLUCAN) 150 MG tablet Take 1 Tab by mouth every day. 1 Tab 0   • clotrimazole (GYNE-LOTRIMIN) 2 % Cream Insert 1 Applicatorful in vagina every bedtime. 1 Tube 0   • citalopram (CELEXA) 40 MG Tab      • triamcinolone (NASACORT) 55 MCG/ACT nasal inhaler Spray 2 Sprays in nose every day. 1 Bottle 0   • cetirizine (ZYRTEC) 10 MG chewable tablet Take 1 Tab by mouth every day. 30 Tab 0   • nicotine (NICODERM) 21 MG/24HR PATCH 24 HR Apply 1 Patch to skin as directed every 24 hours.  "30 Patch 1   • cephALEXin (KEFLEX) 250 MG Cap Take 1 Cap by mouth 3 times a day. 21 Cap 0   • mupirocin (BACTROBAN) 2 % Ointment Apply 1 Application to affected area(s) 2 times a day. 1 Tube 0   • raNITidine (ZANTAC) 150 MG Tab Take 1 Tab by mouth 1 time daily as needed for Heartburn. 30 Tab 3   • clotrimazole-betamethasone (LOTRISONE) 1-0.05 % Cream APPLY  CREAM TOPICALLY TO AFFECTED AREA TWICE DAILY (Patient not taking: Reported on 1/11/2019) 1 Tube 1   • albuterol (PROVENTIL) 2.5mg/3ml Nebu Soln solution for nebulization 3 mL by Nebulization route every four hours as needed. AS NEEDED FOR SHORTNESS OF BREATH 360 mL 3   • atorvastatin (LIPITOR) 40 MG Tab TAKE 1 TABLET BY MOUTH ONCE DAILY IN THE EVENING 30 Tab 6   • albuterol (VENTOLIN HFA) 108 (90 Base) MCG/ACT Aero Soln inhalation aerosol INHALE TWO PUFFS BY MOUTH EVERY 6 HOURS AS NEEDED FOR SHORTNESS OF BREATH 1 Inhaler 3   • lactulose 10 GM/15ML Solution Take 30 mL by mouth 2 times a day. (Patient not taking: Reported on 1/11/2019) 1 Bottle 1   • docusate sodium (COLACE) 100 MG Cap Take 1 Cap by mouth 2 times a day. (Patient not taking: Reported on 1/11/2019) 60 Cap 6   • clonazepam (KLONOPIN) 0.5 MG Tab Take 0.5 mg by mouth 3 times a day.     • prazosin (MINIPRESS) 5 MG Cap Take 10 mg by mouth every evening.       No current facility-administered medications for this visit.            Review Of Systems  As documented in HPI above  PHYSICAL EXAMINATION:    /62 (BP Location: Left arm, Patient Position: Sitting, BP Cuff Size: Large adult)   Pulse 96   Temp 36.6 °C (97.8 °F) (Temporal)   Resp 16   Ht 1.676 m (5' 5.98\")   Wt 121.1 kg (267 lb)   LMP 01/05/2019   SpO2 94%   BMI 43.12 kg/m²   Gen.: Well-developed, well-nourished, no apparent distress, pleasant and cooperative with the examination  HEENT: Normocephalic/atraumatic, sinuses nontender with palpation, TMs clear, nares patent with pink mucosa and clear rhinorrhea, oropharynx clear  Neck: No " JVD or bruits, no adenopathy  Cor: Regular rate and rhythm without murmur gallop or rub  Lungs: Clear to auscultation with equal breath sounds bilaterally. No wheezes, rhonchi.  Abdomen: Soft nontender without hepatosplenomegaly or masses appreciated, normoactive bowel sounds  Extremities: No cyanosis, clubbing or edema          ASSESSMENT/Plan:  1. Chronic congestion of paranasal sinus   this is chronic, not improving on medications and conservative management patient to continue Singulair and nasal spray, follow-up with ENT for further evaluation of possible nasal septum deviation    REFERRAL TO ENT   2. Itching in the vaginal area   likely yeast vaginitis advised to use medication as directed and increase probiotic intake      fluconazole (DIFLUCAN) 150 MG tablet    clotrimazole (GYNE-LOTRIMIN) 2 % Cream   3. Difficulty urinating   urine dip at the office negative for urine infection advised to wait for urine culture if there is any evidence of infection will treat with antibiotics, likely related to the patient's body habitus and obesity related to weak pelvic floor discussed Kegel's exercise and conservative management and weight loss if not improved patient to come back for trial of Vesicare tablets.    URINE CULTURE(NEW)    POCT Urinalysis     Please note that this dictation was created using voice recognition software. I have made every reasonable attempt to correct obvious errors but there may be errors of grammar and content that I may have overlooked prior to finalization of this note.

## 2019-01-27 LAB
BACTERIA UR CULT: ABNORMAL
BACTERIA UR CULT: ABNORMAL
SIGNIFICANT IND 70042: ABNORMAL
SITE SITE: ABNORMAL
SOURCE SOURCE: ABNORMAL

## 2019-01-29 RX ORDER — SULFAMETHOXAZOLE AND TRIMETHOPRIM 800; 160 MG/1; MG/1
1 TABLET ORAL 2 TIMES DAILY
Qty: 14 TAB | Refills: 0 | Status: SHIPPED | OUTPATIENT
Start: 2019-01-29 | End: 2019-03-04

## 2019-01-31 ENCOUNTER — TELEPHONE (OUTPATIENT)
Dept: MEDICAL GROUP | Facility: MEDICAL CENTER | Age: 39
End: 2019-01-31

## 2019-01-31 RX ORDER — CLOTRIMAZOLE 1 %
CREAM (GRAM) TOPICAL
Qty: 1 TUBE | Refills: 0 | Status: SHIPPED | OUTPATIENT
Start: 2019-01-31 | End: 2019-11-27

## 2019-01-31 NOTE — TELEPHONE ENCOUNTER
Pt's pharmacy called and stated the rx that was sent over for the clotrimazole 2% cream needs to be changed to 1%, they state they do not carry the 2% and would like to know if the 1% would be ok. Their cb # is [phone]

## 2019-02-06 ENCOUNTER — HOSPITAL ENCOUNTER (OUTPATIENT)
Dept: OTHER | Facility: MEDICAL CENTER | Age: 39
End: 2019-02-06
Attending: FAMILY MEDICINE
Payer: MEDICAID

## 2019-02-06 PROCEDURE — S9453 SMOKING CESSATION CLASS: HCPCS

## 2019-02-06 NOTE — RESPIRATORY CARE
"TOBACCO CESSATION WEEK 1  (Phone: [phone])  2/6/2019 at 11:07 AM by Meagan Lopes       Patient attended week 1 of the Quit Tobacco class from  10 to 11 am.  Patient has a pharmacy consult on 2/20/2019. Patient has not quit smoking.This week we covered the health effects of smoking and the benefits of quitting. The patient received the first part of their personal Tobacco Quit Plan \"Reasons For Quitting\". We will see them back next week for part 2 of the program.   "

## 2019-02-13 ENCOUNTER — APPOINTMENT (OUTPATIENT)
Dept: OTHER | Facility: MEDICAL CENTER | Age: 39
End: 2019-02-13
Attending: FAMILY MEDICINE
Payer: MEDICAID

## 2019-02-20 ENCOUNTER — APPOINTMENT (OUTPATIENT)
Dept: OTHER | Facility: MEDICAL CENTER | Age: 39
End: 2019-02-20
Payer: MEDICAID

## 2019-02-20 ENCOUNTER — APPOINTMENT (OUTPATIENT)
Dept: VASCULAR LAB | Facility: MEDICAL CENTER | Age: 39
End: 2019-02-20
Payer: MEDICAID

## 2019-02-28 ENCOUNTER — TELEPHONE (OUTPATIENT)
Dept: SCHEDULING | Facility: IMAGING CENTER | Age: 39
End: 2019-02-28

## 2019-02-28 ENCOUNTER — APPOINTMENT (OUTPATIENT)
Dept: RADIOLOGY | Facility: MEDICAL CENTER | Age: 39
End: 2019-02-28
Attending: EMERGENCY MEDICINE
Payer: MEDICAID

## 2019-02-28 ENCOUNTER — HOSPITAL ENCOUNTER (EMERGENCY)
Facility: MEDICAL CENTER | Age: 39
End: 2019-02-28
Attending: EMERGENCY MEDICINE
Payer: MEDICAID

## 2019-02-28 VITALS
RESPIRATION RATE: 15 BRPM | TEMPERATURE: 98.1 F | SYSTOLIC BLOOD PRESSURE: 123 MMHG | HEART RATE: 66 BPM | HEIGHT: 66 IN | OXYGEN SATURATION: 90 % | DIASTOLIC BLOOD PRESSURE: 87 MMHG | BODY MASS INDEX: 42.77 KG/M2 | WEIGHT: 266.1 LBS

## 2019-02-28 DIAGNOSIS — R07.89 OTHER CHEST PAIN: ICD-10-CM

## 2019-02-28 LAB
ALBUMIN SERPL BCP-MCNC: 4.1 G/DL (ref 3.2–4.9)
ALBUMIN/GLOB SERPL: 1.5 G/DL
ALP SERPL-CCNC: 74 U/L (ref 30–99)
ALT SERPL-CCNC: 16 U/L (ref 2–50)
ANION GAP SERPL CALC-SCNC: 5 MMOL/L (ref 0–11.9)
APTT PPP: 27.2 SEC (ref 24.7–36)
AST SERPL-CCNC: 16 U/L (ref 12–45)
BASOPHILS # BLD AUTO: 0.8 % (ref 0–1.8)
BASOPHILS # BLD: 0.06 K/UL (ref 0–0.12)
BILIRUB SERPL-MCNC: 0.5 MG/DL (ref 0.1–1.5)
BNP SERPL-MCNC: 20 PG/ML (ref 0–100)
BUN SERPL-MCNC: 21 MG/DL (ref 8–22)
CALCIUM SERPL-MCNC: 9.8 MG/DL (ref 8.5–10.5)
CHLORIDE SERPL-SCNC: 105 MMOL/L (ref 96–112)
CO2 SERPL-SCNC: 27 MMOL/L (ref 20–33)
CREAT SERPL-MCNC: 0.82 MG/DL (ref 0.5–1.4)
EKG IMPRESSION: NORMAL
EOSINOPHIL # BLD AUTO: 0.21 K/UL (ref 0–0.51)
EOSINOPHIL NFR BLD: 2.8 % (ref 0–6.9)
ERYTHROCYTE [DISTWIDTH] IN BLOOD BY AUTOMATED COUNT: 48.9 FL (ref 35.9–50)
GLOBULIN SER CALC-MCNC: 2.7 G/DL (ref 1.9–3.5)
GLUCOSE SERPL-MCNC: 98 MG/DL (ref 65–99)
HCT VFR BLD AUTO: 49.2 % (ref 37–47)
HGB BLD-MCNC: 16.4 G/DL (ref 12–16)
IMM GRANULOCYTES # BLD AUTO: 0.01 K/UL (ref 0–0.11)
IMM GRANULOCYTES NFR BLD AUTO: 0.1 % (ref 0–0.9)
INR PPP: 1 (ref 0.87–1.13)
LIPASE SERPL-CCNC: 25 U/L (ref 11–82)
LYMPHOCYTES # BLD AUTO: 2.27 K/UL (ref 1–4.8)
LYMPHOCYTES NFR BLD: 30.8 % (ref 22–41)
MCH RBC QN AUTO: 33.7 PG (ref 27–33)
MCHC RBC AUTO-ENTMCNC: 33.3 G/DL (ref 33.6–35)
MCV RBC AUTO: 101 FL (ref 81.4–97.8)
MONOCYTES # BLD AUTO: 0.46 K/UL (ref 0–0.85)
MONOCYTES NFR BLD AUTO: 6.2 % (ref 0–13.4)
NEUTROPHILS # BLD AUTO: 4.36 K/UL (ref 2–7.15)
NEUTROPHILS NFR BLD: 59.3 % (ref 44–72)
NRBC # BLD AUTO: 0 K/UL
NRBC BLD-RTO: 0 /100 WBC
PLATELET # BLD AUTO: 259 K/UL (ref 164–446)
PMV BLD AUTO: 10.3 FL (ref 9–12.9)
POTASSIUM SERPL-SCNC: 4 MMOL/L (ref 3.6–5.5)
PROT SERPL-MCNC: 6.8 G/DL (ref 6–8.2)
PROTHROMBIN TIME: 13.3 SEC (ref 12–14.6)
RBC # BLD AUTO: 4.87 M/UL (ref 4.2–5.4)
SODIUM SERPL-SCNC: 137 MMOL/L (ref 135–145)
TROPONIN I SERPL-MCNC: <0.01 NG/ML (ref 0–0.04)
WBC # BLD AUTO: 7.4 K/UL (ref 4.8–10.8)

## 2019-02-28 PROCEDURE — 84484 ASSAY OF TROPONIN QUANT: CPT

## 2019-02-28 PROCEDURE — 80053 COMPREHEN METABOLIC PANEL: CPT

## 2019-02-28 PROCEDURE — 83880 ASSAY OF NATRIURETIC PEPTIDE: CPT

## 2019-02-28 PROCEDURE — 85730 THROMBOPLASTIN TIME PARTIAL: CPT

## 2019-02-28 PROCEDURE — 99285 EMERGENCY DEPT VISIT HI MDM: CPT

## 2019-02-28 PROCEDURE — 83690 ASSAY OF LIPASE: CPT

## 2019-02-28 PROCEDURE — 71045 X-RAY EXAM CHEST 1 VIEW: CPT

## 2019-02-28 PROCEDURE — 93005 ELECTROCARDIOGRAM TRACING: CPT

## 2019-02-28 PROCEDURE — 93005 ELECTROCARDIOGRAM TRACING: CPT | Performed by: EMERGENCY MEDICINE

## 2019-02-28 PROCEDURE — 85025 COMPLETE CBC W/AUTO DIFF WBC: CPT

## 2019-02-28 PROCEDURE — 85610 PROTHROMBIN TIME: CPT

## 2019-02-28 ASSESSMENT — LIFESTYLE VARIABLES
DO YOU DRINK ALCOHOL: YES
TOTAL SCORE: 2
HOW MANY TIMES IN THE PAST YEAR HAVE YOU HAD 5 OR MORE DRINKS IN A DAY: 0
TOTAL SCORE: 2
TOTAL SCORE: 2
EVER HAD A DRINK FIRST THING IN THE MORNING TO STEADY YOUR NERVES TO GET RID OF A HANGOVER: NO
HAVE YOU EVER FELT YOU SHOULD CUT DOWN ON YOUR DRINKING: YES
DOES PATIENT WANT TO STOP DRINKING: NO
HAVE PEOPLE ANNOYED YOU BY CRITICIZING YOUR DRINKING: YES
ON A TYPICAL DAY WHEN YOU DRINK ALCOHOL HOW MANY DRINKS DO YOU HAVE: 0
AVERAGE NUMBER OF DAYS PER WEEK YOU HAVE A DRINK CONTAINING ALCOHOL: 2
EVER FELT BAD OR GUILTY ABOUT YOUR DRINKING: NO
CONSUMPTION TOTAL: POSITIVE

## 2019-02-28 NOTE — ED TRIAGE NOTES
Chief Complaint   Patient presents with   • Chest Pain     sx for 4 days   • Arm Pain     also c/o weakness     EKG done prior to triage

## 2019-02-28 NOTE — DISCHARGE INSTRUCTIONS
Follow up with Dr Lin as planned.  Keep trying to quit tobacco!  I have called the  to have you see heart specialist, to be on the cautious side.  For new symptoms or any turn for the worse, come back to ER.

## 2019-02-28 NOTE — ED PROVIDER NOTES
"ED Provider Note    CHIEF COMPLAINT  Chief Complaint   Patient presents with   • Chest Pain     sx for 4 days   • Arm Pain     also c/o weakness       HPI  Tracy Calderon is a 38 y.o. female who presents complaining of chest pain.  She saw this for about 4 days.  She is not sure whether history of breast or the chest itself.  Seems to somewhat come and go.  She describes a \"pressure \"or \"weird\" pain.  Nothing really makes it better or worse.  Although she does not exercise, she has not noticed any difficulty with her she exerts herself.  There is no shortness of breath at all.  She does have some discomfort in the left arm as well but she attributes this to quite a bit of cleaning recently.  She has no personal or family history of clotting dyscrasia.  She does smoke but is trying to quit.  No leg pain or swelling.  No recent trips or travel.  No hormones.  There is no other complaint.    PAST MEDICAL HISTORY  Past Medical History:   Diagnosis Date   • Anesthesia     panic attacks and needs Mom to be there when waking up   • Anxiety    • Asthma     will bring inhalers   • Binge eating 7/6/2017   • Bipolar disorder ([facility])    • Breath shortness    • COPD (chronic obstructive pulmonary disease) ([facility]) 2/17/2015   • Cricopharyngeal achalasia 3/12/2015   • Depression    • ETOH abuse    • Heart burn    • History of substance abuse 2/17/2015    ETOH-quit 2013, meth-quit 2007   • Hyperlipidemia 2/17/2015   • Hyperlipidemia 2/17/2015   • Indigestion    • Obesity hypoventilation syndrome ([facility]) 9/6/2017   • Psychiatric disorder    • Snoring        FAMILY HISTORY  Family History   Problem Relation Age of Onset   • Lung Disease Mother    • Stroke Mother    • Heart Disease Maternal Grandfather    • Cancer Neg Hx    • Diabetes Neg Hx        SOCIAL HISTORY  Social History   Substance Use Topics   • Smoking status: Current Every Day Smoker     Packs/day: 0.50     Years: 15.00     Types: Cigarettes   • Smokeless tobacco: Never " Used   • Alcohol use No      Comment: h/o heavy drinking x 16 years. quit drinking in May 2013         SURGICAL HISTORY  Past Surgical History:   Procedure Laterality Date   • SEPTOPLASTY  4/24/2015    Performed by Renard Pierre M.D. at SURGERY SAME DAY [facility] ORS   • TURBINOPLASTY  4/24/2015    Performed by Renard Pierre M.D. at SURGERY SAME DAY [facility] OR[facility]   • ESOPHAGOSCOPY  4/24/2015    Performed by Renard Pierre M.D. at SURGERY SAME DAY [facility]   • TONSILLECTOMY         CURRENT MEDICATIONS  Home Medications     Reviewed by Prem Casey R.N. (Registered Nurse) on 02/28/19 at 1016  Med List Status: <None>   Medication Last Dose Status   albuterol (PROVENTIL) 2.5mg/3ml Nebu Soln solution for nebulization  Active   albuterol (VENTOLIN HFA) 108 (90 Base) MCG/ACT Aero Soln inhalation aerosol  Active   atorvastatin (LIPITOR) 40 MG Tab  Active   cephALEXin (KEFLEX) 250 MG Cap  Active   cetirizine (ZYRTEC) 10 MG chewable tablet  Active   citalopram (CELEXA) 40 MG Tab  Active   clonazepam (KLONOPIN) 0.5 MG Tab  Active   clotrimazole (GYNE-LOTRIMIN) 2 % Cream  Active   clotrimazole (LOTRIMIN) 1 % Cream  Active   clotrimazole-betamethasone (LOTRISONE) 1-0.05 % Cream  Active   docusate sodium (COLACE) 100 MG Cap  Active   fluconazole (DIFLUCAN) 150 MG tablet  Active   lactulose 10 GM/15ML Solution  Active   mupirocin (BACTROBAN) 2 % Ointment  Active   nicotine (NICODERM) 21 MG/24HR PATCH 24 HR  Active   prazosin (MINIPRESS) 5 MG Cap  Active   raNITidine (ZANTAC) 150 MG Tab  Active   sulfamethoxazole-trimethoprim (BACTRIM DS) 800-160 MG tablet  Active   triamcinolone (NASACORT) 55 MCG/ACT nasal inhaler  Active                I have reviewed the nurses notes and/or the list brought with the patient.    ALLERGIES  Allergies   Allergen Reactions   • Penicillins Hives   • Vicodin [Hydrocodone-Acetaminophen]      'WIRES ME, DO NOT LIKE HOW IT MAKES ME FEEL',HYPERACTIVITY       REVIEW OF SYSTEMS  See HPI for further  "details. Review of systems as above, otherwise all other systems are negative.     PHYSICAL EXAM  VITAL SIGNS: /87   Pulse 66   Temp 36.7 °C (98.1 °F) (Temporal)   Resp 15   Ht 1.676 m (5' 6\")   Wt 120.7 kg (266 lb 1.5 oz)   SpO2 90%   BMI 42.95 kg/m²     Constitutional: Well appearing patient in no acute distress.  Not toxic, nor ill in appearance.  HENT: Mucus membranes moist.  Oropharynx is clear.  Eyes: Pupils equally round.  No scleral icterus.   Neck: Full nontender range of motion.  Lymphatic: No cervical lymphadenopathy noted.   Cardiovascular: Regular heart rate and rhythm.  No murmurs, rubs, nor gallop appreciated.   Thorax & Lungs: Chest is nontender.  Breast exam with any as chaperone reveals an unremarkable breast.  The nipple is unremarkable.  There is no discharge.  No masses are appreciated.  Lungs are clear to auscultation with good air movement bilaterally.  No wheeze, rhonchi, nor rales.   Abdomen: Soft, with no tenderness, rebound nor guarding.  No mass, pulsatile mass, nor hepatosplenomegaly appreciated.  Skin: No purpura nor petechia noted.  Extremities/Musculoskeletal: No sign of trauma.  Calves are nontender with no cords nor edema.  No Tomas's sign.  Pulses are intact all around.   Neurologic: Alert & oriented.  Strength and sensation is intact all around.  Gait is normal.  Psychiatric: Normal affect appropriate for the clinical situation.    EKG  I interpreted this EKG myself.  This is a 12-lead study.  The rhythm is sinus arrhythmia with a rate of 65.  There are no ST segment nor T wave abnormalities.  Interpretation: No ST segment elevation myocardial infarction.    LABS  Labs Reviewed   CBC WITH DIFFERENTIAL - Abnormal; Notable for the following:        Result Value    Hemoglobin 16.4 (*)     Hematocrit 49.2 (*)     .0 (*)     MCH 33.7 (*)     MCHC 33.3 (*)     All other components within normal limits    Narrative:     Indicate which anticoagulants the patient is " on:->UNKNOWN   TROPONIN    Narrative:     Indicate which anticoagulants the patient is on:->UNKNOWN   BTYPE NATRIURETIC PEPTIDE    Narrative:     Indicate which anticoagulants the patient is on:->UNKNOWN   COMP METABOLIC PANEL    Narrative:     Indicate which anticoagulants the patient is on:->UNKNOWN   PROTHROMBIN TIME    Narrative:     Indicate which anticoagulants the patient is on:->UNKNOWN   APTT    Narrative:     Indicate which anticoagulants the patient is on:->UNKNOWN   LIPASE    Narrative:     Indicate which anticoagulants the patient is on:->UNKNOWN   ESTIMATED GFR    Narrative:     Indicate which anticoagulants the patient is on:->UNKNOWN         RADIOLOGY/PROCEDURES  I have reviewed the patient's film interpretations myself, and they are read out by the radiologist as:   DX-CHEST-LIMITED (1 VIEW)   Final Result      No acute cardiopulmonary process is identified.        .    MEDICAL RECORD  I have reviewed patient's medical record and pertinent results are listed above.    COURSE & MEDICAL DECISION MAKING  I have reviewed any medical record information, laboratory studies and radiographic results as noted above.  Patient presents with chest pain.  There is no evidence of pneumonia.  I doubt that a cardiac etiology, although I do note she has cardiovascular risk factors.  With days of symptoms I feel the negative troponin excludes this.  However, I would like her to follow-up with cardiology, out of an abundance of caution, and I called our  to help arrange this.  She is negative for pulmonary embolism by the PE rule out criteria.  At this point we will discharge her home.  I recommended symptomatic care.  Should she have any new symptoms or worsening she should return to the ER.    FINAL IMPRESSION  1. Other chest pain           This dictation was created using voice recognition software.    Electronically signed by: Nacho Garner, 2/28/2019 4:58 PM

## 2019-02-28 NOTE — TELEPHONE ENCOUNTER
Pt called scheduling with active sx of chest pain x 1 week and worsening, pt advised to go to ED right away. Pt refused to go.

## 2019-03-04 ENCOUNTER — OFFICE VISIT (OUTPATIENT)
Dept: MEDICAL GROUP | Facility: MEDICAL CENTER | Age: 39
End: 2019-03-04
Attending: FAMILY MEDICINE
Payer: MEDICAID

## 2019-03-04 VITALS
HEIGHT: 66 IN | BODY MASS INDEX: 42.75 KG/M2 | DIASTOLIC BLOOD PRESSURE: 72 MMHG | HEART RATE: 72 BPM | WEIGHT: 266 LBS | SYSTOLIC BLOOD PRESSURE: 124 MMHG | TEMPERATURE: 97.7 F | RESPIRATION RATE: 16 BRPM | OXYGEN SATURATION: 96 %

## 2019-03-04 DIAGNOSIS — J02.9 PHARYNGITIS, UNSPECIFIED ETIOLOGY: ICD-10-CM

## 2019-03-04 PROCEDURE — 99214 OFFICE O/P EST MOD 30 MIN: CPT | Performed by: FAMILY MEDICINE

## 2019-03-04 RX ORDER — AZITHROMYCIN 250 MG/1
TABLET, FILM COATED ORAL
Qty: 6 TAB | Refills: 0 | Status: SHIPPED | OUTPATIENT
Start: 2019-03-04 | End: 2019-04-08

## 2019-03-04 ASSESSMENT — ENCOUNTER SYMPTOMS
HOARSE VOICE: 0
CHILLS: 0
COUGH: 1
SPUTUM PRODUCTION: 0
SWOLLEN GLANDS: 1
SHORTNESS OF BREATH: 0
NECK PAIN: 0
VOMITING: 0
HEADACHES: 0
FEVER: 0
DIARRHEA: 0
STRIDOR: 0
PALPITATIONS: 0
ABDOMINAL PAIN: 0
TROUBLE SWALLOWING: 1

## 2019-03-04 NOTE — PROGRESS NOTES
"Subjective:      Tracy Calderon is a 38 y.o. female who presents with Otalgia (left side since last pm) and Swollen Glands (lt side of neck since last pm)            Pharyngitis    This is a new problem. The current episode started in the past 7 days. The problem has been gradually worsening. The pain is worse on the left side. There has been no fever. The pain is at a severity of 7/10. Associated symptoms include congestion, coughing, ear pain, swollen glands and trouble swallowing. Pertinent negatives include no abdominal pain, diarrhea, drooling, ear discharge, headaches, hoarse voice, plugged ear sensation, neck pain, shortness of breath, stridor or vomiting. Treatments tried: We will have her continue to use her over-the-counter medications as directed.  If she has no improvement over the next 7-10 days we will have her start a antibiotic.  If she has any sudden worsening of her signs or symptoms ER precautions given.       Review of Systems   Constitutional: Negative for chills and fever.   HENT: Positive for congestion, ear pain and trouble swallowing. Negative for drooling, ear discharge, hearing loss, hoarse voice and tinnitus.    Respiratory: Positive for cough. Negative for sputum production, shortness of breath and stridor.    Cardiovascular: Negative for chest pain and palpitations.   Gastrointestinal: Negative for abdominal pain, diarrhea and vomiting.   Musculoskeletal: Negative for neck pain.   Skin: Negative for rash.   Neurological: Negative for headaches.          Objective:     /72 (BP Location: Left arm, Patient Position: Sitting, BP Cuff Size: Large adult)   Pulse 72   Temp 36.5 °C (97.7 °F) (Temporal)   Resp 16   Ht 1.676 m (5' 5.98\")   Wt 120.7 kg (266 lb)   SpO2 96%   BMI 42.95 kg/m²      Physical Exam   Constitutional: She is oriented to person, place, and time. She appears well-developed and well-nourished.   HENT:   Head: Normocephalic and atraumatic.   Congestion " with slight pharyngeal erythema   Neck: Normal range of motion. Neck supple. No thyromegaly present.   Cardiovascular: Normal rate, regular rhythm and normal heart sounds.  Exam reveals no friction rub.    No murmur heard.  Pulmonary/Chest: Effort normal and breath sounds normal. No respiratory distress. She has no wheezes. She has no rales.   Abdominal: Soft. Bowel sounds are normal. She exhibits no distension. There is no tenderness.   Lymphadenopathy:     She has cervical adenopathy.   Neurological: She is alert and oriented to person, place, and time.   Skin: Skin is warm and dry.   Psychiatric: She has a normal mood and affect. Her behavior is normal.               Assessment/Plan:     1. Pharyngitis, unspecified etiology  We will have her get a rapid strep done, will have her use over-the-counter medications to treat her symptoms.  If her symptoms continue to worsen over the next 7-10 days we will have her start a Z-Jose Antonio as needed.  We will continue to follow.

## 2019-03-13 ENCOUNTER — HOSPITAL ENCOUNTER (OUTPATIENT)
Dept: OTHER | Facility: MEDICAL CENTER | Age: 39
End: 2019-03-13
Attending: FAMILY MEDICINE
Payer: MEDICAID

## 2019-03-13 ENCOUNTER — NON-PROVIDER VISIT (OUTPATIENT)
Dept: VASCULAR LAB | Facility: MEDICAL CENTER | Age: 39
End: 2019-03-13
Attending: FAMILY MEDICINE
Payer: MEDICAID

## 2019-03-13 VITALS — HEART RATE: 81 BPM | DIASTOLIC BLOOD PRESSURE: 67 MMHG | SYSTOLIC BLOOD PRESSURE: 116 MMHG

## 2019-03-13 DIAGNOSIS — Z72.0 TOBACCO ABUSE: ICD-10-CM

## 2019-03-13 PROCEDURE — 99407 BEHAV CHNG SMOKING > 10 MIN: CPT | Performed by: NURSE PRACTITIONER

## 2019-03-13 PROCEDURE — S9453 SMOKING CESSATION CLASS: HCPCS

## 2019-03-13 NOTE — RESPIRATORY CARE
"TOBACCO CESSATION WEEK 2  (Phone: [phone])  3/13/2019 at 11:18 AM by Charo Kaur       Patient attended week 2 of the Quit Tobacco class from 10 am  to 11 am.  Patient has not  quit smoking. This week we covered \"Why Do I Smoke?.\" The Horn Self-Test was completed and we reviewed the results. The patient received the second part of their personalized Tobacco Quit Plan and Trigger List to complete. We will see them back next week for part 3 of the program.   "

## 2019-03-13 NOTE — PROGRESS NOTES
"Outpatient Pharmacotherapy Program    Smoking Cessation Note    Reason for Visit: Patient presents for smoking cessation pharmacotherapy  Initial Visit    HPI:    Age at Which Started Smoking: 16  Average number of cigarettes smoked per day: 1 PPD  Additional Smoking Hx: none  Pertinent Psych Hx: anxiety, depression - takes citalopram and clonazepam. On prazosin for nightmares.  Recent quit attempts: Has been trying to quit for the past 1.5 years. Was on Chantix 2 months ago but within the 1st week had nightmares and worsening depression. PCP rx NRT. The patch stimulated her urge to smoke ironically. Pt has dentures so the gum is difficult. Most success with the lozenges. Bupropion worsened her anxiety.     Has chronic sinus/congestion/cough problems. Takes nasocort, cetrizine.    Pt lives with her mother who also smokes, however, her mother takes Chantix and is currently smoking 1 cig per day. Pt's father buys her cigarettes. He also quit. Her family is supportive and trying to get her to also quit.    Her toughest times are in the mornings (likes cigarettes and coffee) and smoking after meals.    Medications reviewed and reconciled     Vitals:  BP:  116/67           HR:   81    Assessment:    Tobacco use disorder, willing to make quit attempt with a combination of pharmacological and behavioral therapy.    Plan:    Behavioral Modification Recommended: Specifically [facility]oking Cessation Classes    Quit Date: or other plans to reduce cigarette usage: as much as possible in the coming weeks    Nicotine Replacement Therapy           4 mg lozenge as needed     The plan is for the patient to reduce her cigarettes during her \"least craving times\" such as in the evenings or during the day when she is busy. We will not plan to cut back in the AMs or after meals yet, but will work our way towards it.    She will try to delay cigarettes by as little as 5 minutes using distraction. I suggested she busy herself with a task " or incorporate small amounts of walking into her daily routine. She will use the lozenge as a replacement instead of a cigarette if possible.    Potential side-effects of all prescribed pharmacotherapy reviewed with patient who verbalizes understanding of the risks and benefits of this therapy.    Follow-up :  with PCP and In pharmacotherapy clinic:  Date: Friday, March 29th, 2019 at 8:00 am    Candy Knight      Visit time: 9-9:20am

## 2019-03-20 ENCOUNTER — HOSPITAL ENCOUNTER (OUTPATIENT)
Dept: OTHER | Facility: MEDICAL CENTER | Age: 39
End: 2019-03-20
Attending: FAMILY MEDICINE
Payer: MEDICAID

## 2019-03-20 PROCEDURE — S9453 SMOKING CESSATION CLASS: HCPCS

## 2019-03-20 NOTE — RESPIRATORY CARE
TOBACCO CESSATION WEEK 3  (Phone: [phone])  3/20/2019 at 11:52 AM by Meagan Lopes       Patient attended week 3 of the Quit Tobacco class from 10 to 11 AM.  Patient has not quit smoking.This week we covered stress, breathing techniques, meditation and relaxation, and specific situations that trigger a craving. We will see them back next week for the final block of education.

## 2019-03-27 ENCOUNTER — HOSPITAL ENCOUNTER (OUTPATIENT)
Dept: OTHER | Facility: MEDICAL CENTER | Age: 39
End: 2019-03-27
Attending: FAMILY MEDICINE
Payer: MEDICAID

## 2019-03-27 PROCEDURE — S9453 SMOKING CESSATION CLASS: HCPCS

## 2019-03-27 NOTE — RESPIRATORY CARE
"TOBACCO CESSATION WEEK 4  (Phone: [phone])  3/27/2019 at 11:13 AM by Charo Kaur       Patient attended week 4 of the Quit Tobacco class from 10:00 AM to 11:00 AM.  Patient has not quit smoking. They are planning to use lozenges to quit. This week we covered the emergency kit, eating healthy snacks, and reviewed the \"Tobacco Quit Plan\". We also reiterated the positive health benefits of quitting. Patient was provided with a certificate of completion for the class.      "

## 2019-03-29 ENCOUNTER — NON-PROVIDER VISIT (OUTPATIENT)
Dept: VASCULAR LAB | Facility: MEDICAL CENTER | Age: 39
End: 2019-03-29
Attending: INTERNAL MEDICINE
Payer: MEDICAID

## 2019-03-29 DIAGNOSIS — Z72.0 TOBACCO ABUSE: ICD-10-CM

## 2019-03-29 PROCEDURE — 99407 BEHAV CHNG SMOKING > 10 MIN: CPT

## 2019-03-29 NOTE — PROGRESS NOTES
Outpatient Pharmacotherapy Program  Time in 07:57  Time out 8.16     Smoking Cessation Note    Age at Which Started Smoking: 16  Average number of cigarettes smoked per day: 1 PPD  Additional Smoking Hx: none  Pertinent Psych Hx: anxiety, depression - takes citalopram and clonazepam. On prazosin for nightmares.  Recent quit attempts: Has been trying to quit for the past 1.5 years. Was on Chantix 2 months ago but within the 1st week had nightmares and worsening depression. PCP rx NRT. The patch stimulated her urge to smoke ironically. Pt has dentures so the gum is difficult. Most success with the lozenges. Bupropion worsened her anxiety.   Recently tried the lozenges which hurt her throat     Has chronic sinus/congestion/cough problems. Takes nasocort, cetrizine.     Pt lives with her mother who also smokes, however, her mother takes Chantix and is currently smoking 1 cig per day. Pt's father buys her cigarettes. He also quit. Her family is supportive and trying to get her to also quit.     Her toughest times are in the mornings (likes cigarettes and coffee) and smoking after meals.     Medications reviewed and reconciled      Assessment:     Tobacco use disorder, willing to make quit attempt with a combination of pharmacological and behavioral therapy.     Plan:     Behavioral Modification Recommended: Specifically [facility] Cessation Classes     Quit Date: or other plans to reduce cigarette usage: as much as possible in the coming weeks    Nicotine Replacement Therapy            Short Acting Nicotine as needed Nicotrol 10 mg inhaler    Patient cannot tolerate the nicotine lozenges, patient has tried and cannot tolerate other pharmacological aids for smoking cessation. Patient is motivated to quit and wants to try another medication.  Patient to try Nicotrol inhaler (6-16 cartridges a day). She is not to smoke a cigarette while on Nicotrol     Potential side-effects of all prescribed pharmacotherapy reviewed  with patient who verbalizes understanding of the risks and benefits of this therapy.    Follow-up :  with PCP and In pharmacotherapy clinic:  Date: 04/12/2019    Glenis Hernández

## 2019-04-08 ENCOUNTER — OFFICE VISIT (OUTPATIENT)
Dept: MEDICAL GROUP | Facility: MEDICAL CENTER | Age: 39
End: 2019-04-08
Attending: INTERNAL MEDICINE
Payer: MEDICAID

## 2019-04-08 VITALS
OXYGEN SATURATION: 94 % | DIASTOLIC BLOOD PRESSURE: 62 MMHG | WEIGHT: 262 LBS | TEMPERATURE: 98.1 F | SYSTOLIC BLOOD PRESSURE: 102 MMHG | HEART RATE: 80 BPM | RESPIRATION RATE: 16 BRPM | BODY MASS INDEX: 42.11 KG/M2 | HEIGHT: 66 IN

## 2019-04-08 DIAGNOSIS — N64.4 PAIN OF LEFT BREAST: Primary | ICD-10-CM

## 2019-04-08 PROCEDURE — 99214 OFFICE O/P EST MOD 30 MIN: CPT | Performed by: INTERNAL MEDICINE

## 2019-04-08 PROCEDURE — 99212 OFFICE O/P EST SF 10 MIN: CPT | Performed by: INTERNAL MEDICINE

## 2019-04-08 RX ORDER — NAPROXEN 500 MG/1
500 TABLET ORAL
Qty: 60 TAB | Refills: 0 | Status: SHIPPED | OUTPATIENT
Start: 2019-04-08 | End: 2019-05-08

## 2019-04-08 NOTE — PROGRESS NOTES
Chief Complaint   Patient presents with   • Follow-Up     chest pains/ breast pain       Subjective:     HPI:   Tracy presents today with the following.    Pain of left breast  She stated that her pain started in mid February 2019.  Onset was sudden, 3 days prior to her ER visit on 2/28/2019.  The pain is involving the left side of her chest wall, mainly around her left breast area.  Recently started to radiate to her left shoulder as well.  Dull in nature and associated with a small bruise on her superior left breast area that she did recently noted.  Pain worsened with physical activity and improves with supporting bra and sometimes taking Aleve.  Pain is constant all the time but it fluctuates throughout the day, 7/10 at its maximum intensity and down to 1/10 or 0/10 after taking Aleve.  She does have chronic cough and shortness of breath at her baseline that she attributed to her chronic smoking history and has been working on smoking cessation recently.  She is down to 7 cigarettes/day from 1 pack/day over the last 1 month and currently using nicotine gums.  Does not have a personal or family history of heart disease that she knows of.  I reviewed her ER visit from February, she had a normal EKG and chest x-ray at that time.  Her pain is unlikely to be from cardiac source and she was discharged for symptomatic therapy.  She told me that she has 2 or 3 previous kids, has not breast-fed her kids and they have been adopted.  She does self breast exam every once in a while but has not noted any abnormal mass apart from her left breast tenderness.        Patient Active Problem List    Diagnosis Date Noted   • Hemangioma of liver 01/11/2019   • Impaired glucose tolerance 01/02/2018   • Morbid obesity with BMI of 45.0-49.9, adult (HCC) 11/30/2017   • Chronic midline low back pain 10/10/2017   • Obesity hypoventilation syndrome (HCC) 09/06/2017   • Binge eating disorder 07/06/2017   • Morbid obesity with BMI of  40.0-44.9, adult ([facility]) 03/07/2017   • Nocturnal sleep-related eating disorder 08/18/2015   • Bilateral edema of lower extremity 08/10/2015   • S/P nasal septoplasty 06/16/2015   • Anxiety 02/17/2015   • History of substance abuse 02/17/2015   • Hyperlipidemia 02/17/2015   • GERD (gastroesophageal reflux disease) 07/18/2013   • Tobacco abuse 07/18/2013   • Pain of left breast 07/18/2013       Current Outpatient Prescriptions   Medication Sig Dispense Refill   • naproxen (NAPROSYN) 500 MG Tab Take 1 Tab by mouth 2 times daily with meals as needed for up to 30 days. 60 Tab 0   • nicotine (NICOTROL) 10 MG inhaler Inhale 1 Puff by mouth as needed for Smoking Cessation. 168 Each 3   • nicotine polacrilex (COMMIT) 4 MG lozenge Place 1 Lozenge in mouth by cheek as needed. 30 Lozenge 3   • clotrimazole (LOTRIMIN) 1 % Cream Apply on affected area twice daily x 2 weeks 1 Tube 0   • clotrimazole (GYNE-LOTRIMIN) 2 % Cream Insert 1 Applicatorful in vagina every bedtime. 1 Tube 0   • citalopram (CELEXA) 40 MG Tab      • triamcinolone (NASACORT) 55 MCG/ACT nasal inhaler Spray 2 Sprays in nose every day. 1 Bottle 0   • albuterol (PROVENTIL) 2.5mg/3ml Nebu Soln solution for nebulization 3 mL by Nebulization route every four hours as needed. AS NEEDED FOR SHORTNESS OF BREATH 360 mL 3   • atorvastatin (LIPITOR) 40 MG Tab TAKE 1 TABLET BY MOUTH ONCE DAILY IN THE EVENING 30 Tab 6   • albuterol (VENTOLIN HFA) 108 (90 Base) MCG/ACT Aero Soln inhalation aerosol INHALE TWO PUFFS BY MOUTH EVERY 6 HOURS AS NEEDED FOR SHORTNESS OF BREATH 1 Inhaler 3   • clonazepam (KLONOPIN) 0.5 MG Tab Take 0.5 mg by mouth 3 times a day.     • prazosin (MINIPRESS) 5 MG Cap Take 10 mg by mouth every evening.     • raNITidine (ZANTAC) 150 MG Tab Take 1 Tab by mouth 1 time daily as needed for Heartburn. 30 Tab 3     No current facility-administered medications for this visit.        Allergies as of 04/08/2019 - Reviewed 04/08/2019   Allergen Reaction Noted   •  Penicillins Hives 08/20/2007   • Vicodin [hydrocodone-acetaminophen]  04/24/2015        Past Medical History:   Diagnosis Date   • Anesthesia     panic attacks and needs Mom to be there when waking up   • Anxiety    • Asthma     will bring inhalers   • Binge eating 7/6/2017   • Bipolar disorder ([facility])    • Breath shortness    • COPD (chronic obstructive pulmonary disease) ([facility]) 2/17/2015   • Cricopharyngeal achalasia 3/12/2015   • Depression    • ETOH abuse    • Heart burn    • History of substance abuse 2/17/2015    ETOH-quit 2013, meth-quit 2007   • Hyperlipidemia 2/17/2015   • Hyperlipidemia 2/17/2015   • Indigestion    • Obesity hypoventilation syndrome ([facility]) 9/6/2017   • Psychiatric disorder    • Snoring        Past Surgical History:   Procedure Laterality Date   • SEPTOPLASTY  4/24/2015    Performed by Renard Pierre M.D. at [facility]   • TURBINOPLASTY  4/24/2015    Performed by Renard Pierre M.D. at [facility]   • ESOPHAGOSCOPY  4/24/2015    Performed by Renard Pierre M.D. at [facility]   • TONSILLECTOMY         Social History   Substance Use Topics   • Smoking status: Current Every Day Smoker     Packs/day: 0.50     Years: 15.00     Types: Cigarettes   • Smokeless tobacco: Never Used   • Alcohol use No      Comment: h/o heavy drinking x 16 years. quit drinking in May 2013       Family History   Problem Relation Age of Onset   • Lung Disease Mother    • Stroke Mother    • Heart Disease Maternal Grandfather    • Cancer Neg Hx    • Diabetes Neg Hx        ROS:     - Constitutional: Negative for fever, chills, unexpected weight change, and fatigue/generalized weakness.     - HEENT: Negative for headaches, vision changes, hearing changes, ear pain, ear discharge, sinus congestion, or sore throat.     - Respiratory: Positive chronic cough and mild dyspnea without sputum production or wheezing.      - Cardiovascular: Negative for chest pain or  "palpitations.      - Gastrointestinal: Negative for heartburn, nausea, vomiting, abdominal pain, diarrhea or constipation.     - Genitourinary: Negative for dysuria, polyuria or urinary urgency.    - Musculoskeletal: Positive left chest wall pain.  HPI.  Negative for myalgias, back pain, and joint pain.     - Skin: Negative for rash, itching, cyanotic skin color change.     - Psychiatric/Behavioral: Negative for depression or suicidal/homicidal ideation.       Physical Exam:     /62 (BP Location: Right arm, Patient Position: Sitting, BP Cuff Size: Large adult)   Pulse 80   Temp 36.7 °C (98.1 °F) (Temporal)   Resp 16   Ht 1.676 m (5' 6\")   Wt 118.8 kg (262 lb)   SpO2 94%  Body mass index is 42.29 kg/m².   Gen:         Alert and oriented, No apparent distress.  Neck:        No Lymphadenopathy or Bruits.  Lungs:     Mild diffuse bilateral expiratory wheezes.    CV:          Regular rate and rhythm. No murmurs, rubs or gallops.     Ext:          No clubbing, cyanosis, edema.  Breast: Large pendulous breasts, Bilaterally symmetrical, no nipple discharge, noted a small area on this upper part of her left bruise, green phase, associated with mild tenderness to palpation no other skin changes or dimpling are  noted.  Both breasts are free of palpable pathology and the axillas are free of lymphadenopathy.      Data:   LABS: CBC, CMP, troponin, BMP from 2/28/2019: Results reviewed and discussed with the patient, questions answered.  Noted polycythemia that is likely related to her chronic smoking.  Imaging: Chest x-ray 2/28/2019, no acute cardiopulmonary process identified.  Results reviewed and discussed with the patient, questions answered.      Assessment and Plan:     38 y.o. female with the following issues.    1. Pain of left breast  New to discuss, uncontrolled problem.  Clinical evaluation consistent with left side chest wall pain.  Description of chest pain is atypical for cardiac or pulmonary " origin.  Left breast is tender on exam with a small bruise.  She denies recent breast trauma but she has a fairly large breast size and has improvement from wearing supporting bra.  Suspecting pain secondary to her large pendulous breasts given that her pain is non-cyclical and ligament stretches also classic with radiation to shoulder area.  We will obtain a breast ultrasound to investigate for other potential pathologies.  We will proceed with a trial of anti-inflammatories, given a prescription for Naprosyn as needed for pain.  Also discussed importance of bra support and weight loss.  She is in the process of getting authorization for bariatric surgery with Dr. Peter.     - US-BREAST BILAT-COMPLETE; Future  - naproxen (NAPROSYN) 500 MG Tab; Take 1 Tab by mouth 2 times daily with meals as needed for up to 30 days.  Dispense: 60 Tab; Refill: 0           Health Maintenance:   Completed.    There are no preventive care reminders to display for this patient.    Follow Up:      No Follow-up on file.      Please note that this dictation was created using voice recognition software. I have made every reasonable attempt to correct obvious errors, but I expect that there are errors of grammar and possibly content that I did not discover before finalizing the note.    Signed by: Azul Arreguin

## 2019-04-08 NOTE — ASSESSMENT & PLAN NOTE
She stated that her pain started in mid February 2019.  Onset was sudden, 3 days prior to her ER visit on 2/28/2019.  The pain is involving the left side of her chest wall, mainly around her left breast area.  Recently started to radiate to her left shoulder as well.  Dull in nature and associated with a small bruise on her superior left breast area that she did recently noted.  Pain worsened with physical activity and improves with supporting bra and sometimes taking Aleve.  Pain is constant all the time but it fluctuates throughout the day, 7/10 at its maximum intensity and down to 1/10 or 0/10 after taking Aleve.  She does have chronic cough and shortness of breath at her baseline that she attributed to her chronic smoking history and has been working on smoking cessation recently.  She is down to 7 cigarettes/day from 1 pack/day over the last 1 month and currently using nicotine gums.  Does not have a personal or family history of heart disease that she knows of.  I reviewed her ER visit from February, she had a normal EKG and chest x-ray at that time.  Her pain is unlikely to be from cardiac source and she was discharged for symptomatic therapy.  She told me that she has 2 or 3 previous kids, has not breast-fed her kids and they have been adopted.  She does self breast exam every once in a while but has not noted any abnormal mass apart from her left breast tenderness.

## 2019-04-12 ENCOUNTER — APPOINTMENT (OUTPATIENT)
Dept: VASCULAR LAB | Facility: MEDICAL CENTER | Age: 39
End: 2019-04-12
Payer: MEDICAID

## 2019-05-01 ENCOUNTER — OFFICE VISIT (OUTPATIENT)
Dept: MEDICAL GROUP | Facility: MEDICAL CENTER | Age: 39
End: 2019-05-01
Attending: INTERNAL MEDICINE
Payer: MEDICAID

## 2019-05-01 VITALS
WEIGHT: 263 LBS | TEMPERATURE: 98.5 F | BODY MASS INDEX: 42.27 KG/M2 | RESPIRATION RATE: 20 BRPM | HEIGHT: 66 IN | OXYGEN SATURATION: 93 % | HEART RATE: 102 BPM | SYSTOLIC BLOOD PRESSURE: 115 MMHG | DIASTOLIC BLOOD PRESSURE: 70 MMHG

## 2019-05-01 DIAGNOSIS — F41.9 ANXIETY: ICD-10-CM

## 2019-05-01 PROCEDURE — 99213 OFFICE O/P EST LOW 20 MIN: CPT | Performed by: INTERNAL MEDICINE

## 2019-05-01 PROCEDURE — 99212 OFFICE O/P EST SF 10 MIN: CPT | Performed by: INTERNAL MEDICINE

## 2019-05-01 NOTE — ASSESSMENT & PLAN NOTE
She has a long-standing history of completed anxiety disorder under the care of a psychiatrist.  She has been going to [facility] for the last few years.  She is currently on Celexa 40 mg daily as well as clonazepam.  She takes 0.5 mg multiple times a day and her dose was titrated by her psychiatrist.  She is to be on twice daily prescription during 2018, that was increased to 3 times and then 4 times daily in February 2019.  Her psychiatrist has been working with her to cut down on clonazepam, her prescription was cut down to twice daily in March.  She was prompted to follow-up about how she does on the lower dose.  She told me that she contacted her psychiatrist office since she was not doing well after she cut back from 4 times to 2 times daily with a flareup of her anxiety.  Per patient, their office medical assistant asked her to start taking it 3 times a day until they reevaluate her so she ran out earlier than expected, she was out of her medications for about 4 days last month until she had another refill.  She felt her most recent prescription on 4/8 for number 60 pills and she ran out again earlier this week.  She went to her psychiatrist office and she was asked to wait outside, she was quite nervous and frustrated so she left before they refill her prescription.  She stated that she had a job interview that day.  Today, she is here and wondering if her primary provider can refill her clonazepam and she would like to switch to a different psych facility in the future after she changed her insurance.  She is in the process of changing her Medicaid plan.  On further questioning, she reported that after she stopped taking clonazepam she has been quite anxious but she denies any nausea, vomiting, tremors or myalgias.  She did report that she is feeling some muscle tension all over her body build.  She is under a controlled substance contract with her psychiatric provider and she does regular urine  drug screen at their office.

## 2019-05-01 NOTE — PROGRESS NOTES
Chief Complaint   Patient presents with   • Anxiety   • Referral Update Request     New psych        Subjective:     HPI:   Tracy presents today with the following.    Anxiety  She has a long-standing history of completed anxiety disorder under the care of a psychiatrist.  She has been going to [facility] for the last few years.  She is currently on Celexa 40 mg daily as well as clonazepam.  She takes 0.5 mg multiple times a day and her dose was titrated by her psychiatrist.  She is to be on twice daily prescription during 2018, that was increased to 3 times and then 4 times daily in February 2019.  Her psychiatrist has been working with her to cut down on clonazepam, her prescription was cut down to twice daily in March.  She was prompted to follow-up about how she does on the lower dose.  She told me that she contacted her psychiatrist office since she was not doing well after she cut back from 4 times to 2 times daily with a flareup of her anxiety.  Per patient, their office medical assistant asked her to start taking it 3 times a day until they reevaluate her so she ran out earlier than expected, she was out of her medications for about 4 days last month until she had another refill.  She felt her most recent prescription on 4/8 for number 60 pills and she ran out again earlier this week.  She went to her psychiatrist office and she was asked to wait outside, she was quite nervous and frustrated so she left before they refill her prescription.  She stated that she had a job interview that day.  Today, she is here and wondering if her primary provider can refill her clonazepam and she would like to switch to a different psych facility in the future after she changed her insurance.  She is in the process of changing her Medicaid plan.  On further questioning, she reported that after she stopped taking clonazepam she has been quite anxious but she denies any nausea, vomiting, tremors or myalgias.  She did  report that she is feeling some muscle tension all over her body build.  She is under a controlled substance contract with her psychiatric provider and she does regular urine drug screen at their office.          Patient Active Problem List    Diagnosis Date Noted   • Hemangioma of liver 01/11/2019   • Impaired glucose tolerance 01/02/2018   • Morbid obesity with BMI of 45.0-49.9, adult ([facility]) 11/30/2017   • Chronic midline low back pain 10/10/2017   • Obesity hypoventilation syndrome ([facility]) 09/06/2017   • Binge eating disorder 07/06/2017   • Morbid obesity with BMI of 40.0-44.9, adult ([facility]) 03/07/2017   • Nocturnal sleep-related eating disorder 08/18/2015   • Bilateral edema of lower extremity 08/10/2015   • S/P nasal septoplasty 06/16/2015   • Anxiety 02/17/2015   • History of substance abuse 02/17/2015   • Hyperlipidemia 02/17/2015   • GERD (gastroesophageal reflux disease) 07/18/2013   • Tobacco abuse 07/18/2013   • Pain of left breast 07/18/2013       Current Outpatient Prescriptions   Medication Sig Dispense Refill   • clonazepam (KLONOPIN) 0.5 MG Tab Take 0.5 mg by mouth 3 times a day.     • raNITidine (ZANTAC) 150 MG Tab TAKE 1 TABLET BY MOUTH ONCE DAILY AS NEEDED FOR  HEARTBURN 30 Tab 3   • naproxen (NAPROSYN) 500 MG Tab Take 1 Tab by mouth 2 times daily with meals as needed for up to 30 days. 60 Tab 0   • nicotine (NICOTROL) 10 MG inhaler Inhale 1 Puff by mouth as needed for Smoking Cessation. 168 Each 3   • nicotine polacrilex (COMMIT) 4 MG lozenge Place 1 Lozenge in mouth by cheek as needed. 30 Lozenge 3   • clotrimazole (LOTRIMIN) 1 % Cream Apply on affected area twice daily x 2 weeks 1 Tube 0   • clotrimazole (GYNE-LOTRIMIN) 2 % Cream Insert 1 Applicatorful in vagina every bedtime. 1 Tube 0   • citalopram (CELEXA) 40 MG Tab      • triamcinolone (NASACORT) 55 MCG/ACT nasal inhaler Spray 2 Sprays in nose every day. 1 Bottle 0   • albuterol (PROVENTIL) 2.5mg/3ml Nebu Soln solution for nebulization 3 mL by  Nebulization route every four hours as needed. AS NEEDED FOR SHORTNESS OF BREATH 360 mL 3   • atorvastatin (LIPITOR) 40 MG Tab TAKE 1 TABLET BY MOUTH ONCE DAILY IN THE EVENING 30 Tab 6   • albuterol (VENTOLIN HFA) 108 (90 Base) MCG/ACT Aero Soln inhalation aerosol INHALE TWO PUFFS BY MOUTH EVERY 6 HOURS AS NEEDED FOR SHORTNESS OF BREATH 1 Inhaler 3   • prazosin (MINIPRESS) 5 MG Cap Take 10 mg by mouth every evening.       No current facility-administered medications for this visit.        Allergies as of 05/01/2019 - Reviewed 04/08/2019   Allergen Reaction Noted   • Penicillins Hives 08/20/2007   • Vicodin [hydrocodone-acetaminophen]  04/24/2015        Past Medical History:   Diagnosis Date   • Anesthesia     panic attacks and needs Mom to be there when waking up   • Anxiety    • Asthma     will bring inhalers   • Binge eating 7/6/2017   • Bipolar disorder (HCC)    • Breath shortness    • COPD (chronic obstructive pulmonary disease) (HCC) 2/17/2015   • Cricopharyngeal achalasia 3/12/2015   • Depression    • ETOH abuse    • Heart burn    • History of substance abuse 2/17/2015    ETOH-quit 2013, meth-quit 2007   • Hyperlipidemia 2/17/2015   • Hyperlipidemia 2/17/2015   • Indigestion    • Obesity hypoventilation syndrome (HCC) 9/6/2017   • Psychiatric disorder    • Snoring        Past Surgical History:   Procedure Laterality Date   • SEPTOPLASTY  4/24/2015    Performed by Renard Pierre M.D. at [facility]   • TURBINOPLASTY  4/24/2015    Performed by Renard Pierre M.D. at [facility]   • ESOPHAGOSCOPY  4/24/2015    Performed by Renard Pierre M.D. at [facility]   • TONSILLECTOMY         Social History   Substance Use Topics   • Smoking status: Current Every Day Smoker     Packs/day: 0.50     Years: 15.00     Types: Cigarettes   • Smokeless tobacco: Never Used   • Alcohol use No      Comment: h/o heavy drinking x 16 years. quit drinking in May 2013       Family  "History   Problem Relation Age of Onset   • Lung Disease Mother    • Stroke Mother    • Heart Disease Maternal Grandfather    • Cancer Neg Hx    • Diabetes Neg Hx        ROS:     - Constitutional: Negative for fever, chills, unexpected weight change, and fatigue/generalized weakness.     - HEENT: Negative for headaches, vision changes, hearing changes, ear pain, ear discharge, sinus congestion, or sore throat.     - Respiratory: Negative for cough, sputum production, dyspnea and wheezing.    - Cardiovascular: Negative for chest pain or palpitations.      - Gastrointestinal: Negative for heartburn, nausea, vomiting, abdominal pain, diarrhea or constipation.     - Genitourinary: Negative for dysuria, polyuria or urinary urgency.    - Musculoskeletal: Negative for myalgias, back pain, and joint pain.     - Skin: Negative for rash, itching, cyanotic skin color change.     - Psychiatric/Behavioral: + Anxiety. Negative for depression or suicidal/homicidal ideation.       Physical Exam:     /70 (BP Location: Left arm, Patient Position: Sitting, BP Cuff Size: Large adult)   Pulse (!) 102   Temp 36.9 °C (98.5 °F) (Temporal)   Resp 20   Ht 1.676 m (5' 5.98\")   Wt 119.3 kg (263 lb)   SpO2 93%  Body mass index is 42.47 kg/m².   Gen:         Alert and oriented, No apparent distress.  Neck:        No Lymphadenopathy or Bruits.  Lungs:     Clear to auscultation bilaterally  CV:          Regular rate and rhythm. No murmurs, rubs or gallops.               Ext:          No clubbing, cyanosis, edema.      Assessment and Plan:     38 y.o. female with the following issues.    1. Anxiety  New to me, chronic uncontrolled problem.  Currently on celexa and chronic benzodiazepine dependence as discussed in HPI.  I discussed with her that the best course of action at this time is to contact her current psychiatrist office for clonazepam refill because she is currently under a controlled substance agreement with that office.  I " also discussed with her that over time it is reasonable to continue to slowly cut down on clonazepam due to long-term side effects and benzodiazepine dependence.  I placed a new referral to psychiatry per her request, as she would like to establish care with a new psychiatrist in town once her new insurance for [facility] is set up.  Continue on Celexa 40 mg daily.  I also spent time providing psychologic counseling and support.  Patient agreed with the aforementioned plan of care.    - REFERRAL TO BEHAVIORAL HEALTH      Follow Up:      No Follow-up on file.      Please note that this dictation was created using voice recognition software. I have made every reasonable attempt to correct obvious errors, but I expect that there are errors of grammar and possibly content that I did not discover before finalizing the note.    Signed by: Azul Arreguin M.D.

## 2019-06-04 RX ORDER — ATORVASTATIN CALCIUM 40 MG/1
TABLET, FILM COATED ORAL
Qty: 90 TAB | Refills: 1 | Status: SHIPPED | OUTPATIENT
Start: 2019-06-04 | End: 2019-11-27

## 2019-08-09 ENCOUNTER — OFFICE VISIT (OUTPATIENT)
Dept: MEDICAL GROUP | Facility: MEDICAL CENTER | Age: 39
End: 2019-08-09
Attending: FAMILY MEDICINE
Payer: MEDICAID

## 2019-08-09 VITALS
HEART RATE: 100 BPM | RESPIRATION RATE: 20 BRPM | TEMPERATURE: 97.8 F | HEIGHT: 66 IN | OXYGEN SATURATION: 92 % | WEIGHT: 263 LBS | BODY MASS INDEX: 42.27 KG/M2 | SYSTOLIC BLOOD PRESSURE: 124 MMHG | DIASTOLIC BLOOD PRESSURE: 68 MMHG

## 2019-08-09 DIAGNOSIS — F41.9 ANXIETY: ICD-10-CM

## 2019-08-09 DIAGNOSIS — J45.20 MILD INTERMITTENT ASTHMA IN ADULT WITHOUT COMPLICATION: ICD-10-CM

## 2019-08-09 DIAGNOSIS — F17.200 SMOKER: ICD-10-CM

## 2019-08-09 PROCEDURE — 99213 OFFICE O/P EST LOW 20 MIN: CPT | Performed by: FAMILY MEDICINE

## 2019-08-09 RX ORDER — ALBUTEROL SULFATE 90 UG/1
AEROSOL, METERED RESPIRATORY (INHALATION)
Qty: 1 INHALER | Refills: 11 | Status: SHIPPED | OUTPATIENT
Start: 2019-08-09 | End: 2019-11-27 | Stop reason: SDUPTHER

## 2019-08-12 ENCOUNTER — TELEPHONE (OUTPATIENT)
Dept: MEDICAL GROUP | Facility: MEDICAL CENTER | Age: 39
End: 2019-08-12

## 2019-08-12 NOTE — TELEPHONE ENCOUNTER
Pt called in requesting phone number for her newest referral.    Called pt back, advised the referral has not been finished yet but it should be by Friday if not to call back.  Stated verbal understanding.

## 2019-11-27 ENCOUNTER — OFFICE VISIT (OUTPATIENT)
Dept: MEDICAL GROUP | Facility: MEDICAL CENTER | Age: 39
End: 2019-11-27
Attending: INTERNAL MEDICINE
Payer: MEDICAID

## 2019-11-27 ENCOUNTER — HOSPITAL ENCOUNTER (OUTPATIENT)
Dept: LAB | Facility: MEDICAL CENTER | Age: 39
End: 2019-11-27
Attending: INTERNAL MEDICINE
Payer: MEDICAID

## 2019-11-27 VITALS
HEIGHT: 66 IN | BODY MASS INDEX: 36.96 KG/M2 | TEMPERATURE: 98.1 F | SYSTOLIC BLOOD PRESSURE: 122 MMHG | DIASTOLIC BLOOD PRESSURE: 82 MMHG | HEART RATE: 80 BPM | RESPIRATION RATE: 16 BRPM | WEIGHT: 230 LBS | OXYGEN SATURATION: 94 %

## 2019-11-27 DIAGNOSIS — N63.0 BREAST LUMP IN FEMALE: ICD-10-CM

## 2019-11-27 DIAGNOSIS — Z20.2 EXPOSURE TO SYPHILIS: ICD-10-CM

## 2019-11-27 DIAGNOSIS — D17.1 LIPOMA OF TORSO: ICD-10-CM

## 2019-11-27 PROBLEM — D17.9 LIPOMA: Status: ACTIVE | Noted: 2019-11-27

## 2019-11-27 LAB — TREPONEMA PALLIDUM IGG+IGM AB [PRESENCE] IN SERUM OR PLASMA BY IMMUNOASSAY: NON REACTIVE

## 2019-11-27 PROCEDURE — 99214 OFFICE O/P EST MOD 30 MIN: CPT | Performed by: INTERNAL MEDICINE

## 2019-11-27 PROCEDURE — 36415 COLL VENOUS BLD VENIPUNCTURE: CPT

## 2019-11-27 PROCEDURE — 86780 TREPONEMA PALLIDUM: CPT

## 2019-11-27 PROCEDURE — 99212 OFFICE O/P EST SF 10 MIN: CPT | Performed by: INTERNAL MEDICINE

## 2019-11-27 RX ORDER — ALBUTEROL SULFATE 90 UG/1
AEROSOL, METERED RESPIRATORY (INHALATION)
Qty: 1 INHALER | Refills: 5 | Status: SHIPPED | OUTPATIENT
Start: 2019-11-27 | End: 2021-07-13 | Stop reason: SDUPTHER

## 2019-11-27 ASSESSMENT — PAIN SCALES - GENERAL: PAINLEVEL: 3=SLIGHT PAIN

## 2019-11-27 NOTE — ASSESSMENT & PLAN NOTE
She has noticed a small subcutaneous mobile soft lump over her thoracic spine.  She states that she just noticed this about 2 weeks ago.  It is not tender, but she wanted to bring it up because it is new.  She states that she recently lost about 75 pounds but otherwise no significant changes in her health.  This weight loss was intentional.  She also notices a similar lump over her upper abdomen just below the xiphoid process.  Again, this lump is not significantly tender unless you push directly on it.  She denies any overlying rashes or skin changes.

## 2019-11-27 NOTE — Clinical Note
Js Casillas,Can you keep an eye out for this patient's RPR as I will be out on vacation next week?  Ester

## 2019-11-27 NOTE — ASSESSMENT & PLAN NOTE
She reports that several months ago in [place], her boyfriend tested positive for syphilis.  She states he received 1 penicillin shot and she was given an antibiotic for 2 weeks because she is allergic to penicillin.  She is not sure which antibiotic she was given.  She states she took it for about a week and then stopped.  When her boyfriend was diagnosed, he was symptomatic with a genital rash and some lymph nodes in the groin.  She denies any symptoms currently or in the past.  She states that after he got his shot his rash resolved.  She never received to the results of her test done in [place] and she is requesting retesting today.  She declines testing for gonorrhea, chlamydia, HIV.

## 2019-11-27 NOTE — PROGRESS NOTES
Subjective:   Tracy Calderon is a 39 y.o. female here today for requesting testing for syphilis, multiple lumps on chest and back.  Her PCP, Dr. Lin is unavailable to see her today    Exposure to syphilis  She reports that several months ago in [place], her boyfriend tested positive for syphilis.  She states he received 1 penicillin shot and she was given an antibiotic for 2 weeks because she is allergic to penicillin.  She is not sure which antibiotic she was given.  She states she took it for about a week and then stopped.  When her boyfriend was diagnosed, he was symptomatic with a genital rash and some lymph nodes in the groin.  She denies any symptoms currently or in the past.  She states that after he got his shot his rash resolved.  She never received to the results of her test done in [place] and she is requesting retesting today.  She declines testing for gonorrhea, chlamydia, HIV.      Breast lump in female  She reports that for about 2 weeks she has noticed a lump in the left lateral breast around the 9 o'clock position.  States that it is somewhat tender in this area as well.  She has never had any breast imaging done.  She denies any nipple discharge, overlying skin changes or rashes, redness or swelling in her armpit or breast.  She is not breast-feeding.    Lipoma  She has noticed a small subcutaneous mobile soft lump over her thoracic spine.  She states that she just noticed this about 2 weeks ago.  It is not tender, but she wanted to bring it up because it is new.  She states that she recently lost about 75 pounds but otherwise no significant changes in her health.  This weight loss was intentional.  She also notices a similar lump over her upper abdomen just below the xiphoid process.  Again, this lump is not significantly tender unless you push directly on it.  She denies any overlying rashes or skin changes.       Current medicines (including changes today)  Current Outpatient Medications    Medication Sig Dispense Refill   • albuterol (VENTOLIN HFA) 108 (90 Base) MCG/ACT Aero Soln inhalation aerosol INHALE TWO PUFFS BY MOUTH EVERY 6 HOURS AS NEEDED FOR SHORTNESS OF BREATH 1 Inhaler 5   • citalopram (CELEXA) 40 MG Tab      • albuterol (PROVENTIL) 2.5mg/3ml Nebu Soln solution for nebulization 3 mL by Nebulization route every four hours as needed. AS NEEDED FOR SHORTNESS OF BREATH 360 mL 3   • clonazepam (KLONOPIN) 0.5 MG Tab Take 0.5 mg by mouth 3 times a day.     • prazosin (MINIPRESS) 5 MG Cap Take 10 mg by mouth every evening.       No current facility-administered medications for this visit.      She  has a past medical history of Anesthesia, Anxiety, Asthma, Binge eating (7/6/2017), Bipolar disorder ([facility]), Breath shortness, COPD (chronic obstructive pulmonary disease) ([facility]) (2/17/2015), Cricopharyngeal achalasia (3/12/2015), Depression, ETOH abuse, Heart burn, History of substance abuse ([facility]) (2/17/2015), Hyperlipidemia (2/17/2015), Hyperlipidemia (2/17/2015), Indigestion, Obesity hypoventilation syndrome ([facility]) (9/6/2017), Psychiatric disorder, and Snoring. She also has no past medical history of Diabetes or Hypertension.    ROS   Denies chest pain, shortness of breath  As above in HPI     Objective:     Vitals:    11/27/19 0802   BP: 122/82   Pulse: 80   Resp: 16   Temp: 36.7 °C (98.1 °F)   SpO2: 94%     Body mass index is 37.14 kg/m².   Physical Exam:  Constitutional: Alert, no distress.  Skin: Warm, dry, good turgor, no rashes in visible areas, approximately 1 cm mobile round soft subcutaneous lump over approximately L1/T12 vertebral prominence that is nontender and consistent with lipoma, approximately 1 cm soft round mobile mass just below the xiphoid process again consistent with lipoma.  Eye: Equal, round and reactive, conjunctiva clear, lids normal.  Breast: Left breast with some fibrous breast tissue on palpation but no discrete lump felt in the area of patient concern, no axillary  lymphadenopathy, no nipple discharge      Assessment and Plan:   The following treatment plan was discussed    1. Exposure to syphilis  We will obtain screening test for syphilis.  Will treat if positive.  - RPR (SYPHILIS); Future    2. Breast lump in female  Although I did not feel any discrete mass on exam, we discussed that the safest thing to do is to obtain detailed breast imaging of the affected breast and I have ordered this for her today.  She will likely get this done in [place] and she was given our fax number so that they can send us the results  - MA-DIAGNOSTIC MAMMO UNI W/TOMOSYNTHESIS W/CAD; Future    3. Lipoma of torso  Reassurance was given.  She will continue to monitor these areas and if she does notice significant growth or tenderness she will let us know.        Followup: Return if symptoms worsen or fail to improve.

## 2019-11-27 NOTE — ASSESSMENT & PLAN NOTE
She reports that for about 2 weeks she has noticed a lump in the left lateral breast around the 9 o'clock position.  States that it is somewhat tender in this area as well.  She has never had any breast imaging done.  She denies any nipple discharge, overlying skin changes or rashes, redness or swelling in her armpit or breast.  She is not breast-feeding.

## 2019-11-28 ENCOUNTER — HOSPITAL ENCOUNTER (EMERGENCY)
Facility: MEDICAL CENTER | Age: 39
End: 2019-11-28
Attending: EMERGENCY MEDICINE
Payer: MEDICAID

## 2019-11-28 ENCOUNTER — APPOINTMENT (OUTPATIENT)
Dept: RADIOLOGY | Facility: MEDICAL CENTER | Age: 39
End: 2019-11-28
Attending: EMERGENCY MEDICINE
Payer: MEDICAID

## 2019-11-28 VITALS
BODY MASS INDEX: 37.41 KG/M2 | DIASTOLIC BLOOD PRESSURE: 70 MMHG | WEIGHT: 232.81 LBS | OXYGEN SATURATION: 94 % | SYSTOLIC BLOOD PRESSURE: 111 MMHG | HEART RATE: 59 BPM | RESPIRATION RATE: 16 BRPM | TEMPERATURE: 97.5 F | HEIGHT: 66 IN

## 2019-11-28 DIAGNOSIS — R07.89 CHEST WALL PAIN: ICD-10-CM

## 2019-11-28 DIAGNOSIS — S46.912A SHOULDER STRAIN, LEFT, INITIAL ENCOUNTER: ICD-10-CM

## 2019-11-28 LAB
ALBUMIN SERPL BCP-MCNC: 3.9 G/DL (ref 3.2–4.9)
ALBUMIN/GLOB SERPL: 1.6 G/DL
ALP SERPL-CCNC: 55 U/L (ref 30–99)
ALT SERPL-CCNC: 11 U/L (ref 2–50)
ANION GAP SERPL CALC-SCNC: 6 MMOL/L (ref 0–11.9)
AST SERPL-CCNC: 13 U/L (ref 12–45)
BASOPHILS # BLD AUTO: 0.7 % (ref 0–1.8)
BASOPHILS # BLD: 0.06 K/UL (ref 0–0.12)
BILIRUB SERPL-MCNC: 0.3 MG/DL (ref 0.1–1.5)
BUN SERPL-MCNC: 24 MG/DL (ref 8–22)
CALCIUM SERPL-MCNC: 9.2 MG/DL (ref 8.5–10.5)
CHLORIDE SERPL-SCNC: 104 MMOL/L (ref 96–112)
CO2 SERPL-SCNC: 27 MMOL/L (ref 20–33)
CREAT SERPL-MCNC: 0.7 MG/DL (ref 0.5–1.4)
EKG IMPRESSION: NORMAL
EOSINOPHIL # BLD AUTO: 0.27 K/UL (ref 0–0.51)
EOSINOPHIL NFR BLD: 3.2 % (ref 0–6.9)
ERYTHROCYTE [DISTWIDTH] IN BLOOD BY AUTOMATED COUNT: 49.3 FL (ref 35.9–50)
GLOBULIN SER CALC-MCNC: 2.5 G/DL (ref 1.9–3.5)
GLUCOSE SERPL-MCNC: 93 MG/DL (ref 65–99)
HCG SERPL QL: NEGATIVE
HCT VFR BLD AUTO: 47.4 % (ref 37–47)
HGB BLD-MCNC: 15.6 G/DL (ref 12–16)
IMM GRANULOCYTES # BLD AUTO: 0.01 K/UL (ref 0–0.11)
IMM GRANULOCYTES NFR BLD AUTO: 0.1 % (ref 0–0.9)
LYMPHOCYTES # BLD AUTO: 2.72 K/UL (ref 1–4.8)
LYMPHOCYTES NFR BLD: 32 % (ref 22–41)
MCH RBC QN AUTO: 33.5 PG (ref 27–33)
MCHC RBC AUTO-ENTMCNC: 32.9 G/DL (ref 33.6–35)
MCV RBC AUTO: 101.7 FL (ref 81.4–97.8)
MONOCYTES # BLD AUTO: 0.52 K/UL (ref 0–0.85)
MONOCYTES NFR BLD AUTO: 6.1 % (ref 0–13.4)
NEUTROPHILS # BLD AUTO: 4.93 K/UL (ref 2–7.15)
NEUTROPHILS NFR BLD: 57.9 % (ref 44–72)
NRBC # BLD AUTO: 0 K/UL
NRBC BLD-RTO: 0 /100 WBC
PLATELET # BLD AUTO: 284 K/UL (ref 164–446)
PMV BLD AUTO: 10.2 FL (ref 9–12.9)
POTASSIUM SERPL-SCNC: 4.3 MMOL/L (ref 3.6–5.5)
PROT SERPL-MCNC: 6.4 G/DL (ref 6–8.2)
RBC # BLD AUTO: 4.66 M/UL (ref 4.2–5.4)
SODIUM SERPL-SCNC: 137 MMOL/L (ref 135–145)
TROPONIN T SERPL-MCNC: <6 NG/L (ref 6–19)
WBC # BLD AUTO: 8.5 K/UL (ref 4.8–10.8)

## 2019-11-28 PROCEDURE — 96374 THER/PROPH/DIAG INJ IV PUSH: CPT

## 2019-11-28 PROCEDURE — 84484 ASSAY OF TROPONIN QUANT: CPT

## 2019-11-28 PROCEDURE — 99284 EMERGENCY DEPT VISIT MOD MDM: CPT

## 2019-11-28 PROCEDURE — 80053 COMPREHEN METABOLIC PANEL: CPT

## 2019-11-28 PROCEDURE — 85025 COMPLETE CBC W/AUTO DIFF WBC: CPT

## 2019-11-28 PROCEDURE — 93005 ELECTROCARDIOGRAM TRACING: CPT | Performed by: EMERGENCY MEDICINE

## 2019-11-28 PROCEDURE — 93005 ELECTROCARDIOGRAM TRACING: CPT

## 2019-11-28 PROCEDURE — 71045 X-RAY EXAM CHEST 1 VIEW: CPT

## 2019-11-28 PROCEDURE — 700111 HCHG RX REV CODE 636 W/ 250 OVERRIDE (IP): Performed by: EMERGENCY MEDICINE

## 2019-11-28 PROCEDURE — 84703 CHORIONIC GONADOTROPIN ASSAY: CPT

## 2019-11-28 RX ORDER — CYCLOBENZAPRINE HCL 10 MG
10 TABLET ORAL 3 TIMES DAILY PRN
Qty: 30 TAB | Refills: 0 | Status: SHIPPED | OUTPATIENT
Start: 2019-11-28 | End: 2021-02-18

## 2019-11-28 RX ORDER — KETOROLAC TROMETHAMINE 30 MG/ML
30 INJECTION, SOLUTION INTRAMUSCULAR; INTRAVENOUS ONCE
Status: COMPLETED | OUTPATIENT
Start: 2019-11-28 | End: 2019-11-28

## 2019-11-28 RX ADMIN — KETOROLAC TROMETHAMINE 30 MG: 30 INJECTION, SOLUTION INTRAMUSCULAR at 08:33

## 2019-11-28 ASSESSMENT — LIFESTYLE VARIABLES
DO YOU DRINK ALCOHOL: NO
EVER FELT BAD OR GUILTY ABOUT YOUR DRINKING: NO
HAVE YOU EVER FELT YOU SHOULD CUT DOWN ON YOUR DRINKING: NO
HAVE PEOPLE ANNOYED YOU BY CRITICIZING YOUR DRINKING: NO
CONSUMPTION TOTAL: INCOMPLETE
TOTAL SCORE: 0
EVER HAD A DRINK FIRST THING IN THE MORNING TO STEADY YOUR NERVES TO GET RID OF A HANGOVER: NO
DOES PATIENT WANT TO STOP DRINKING: NO
TOTAL SCORE: 0
TOTAL SCORE: 0

## 2019-11-28 NOTE — ED TRIAGE NOTES
38 y/o female ambulatory to triage with c/o left sided chest pain, shoulder pain and back pain. Pt states she took Aleve and a muscle relaxer this morning without relief, also has a lidocaine patch on. Pt states symptoms began on Saturday and have only gotten worse.

## 2019-11-28 NOTE — ED NOTES
Patient given dc papers, axox4, vss, steady gate noted, script in hand. Instructed to follow up with pcp tomorrow. Care transferred to self
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
## 2020-03-26 ENCOUNTER — TELEPHONE (OUTPATIENT)
Dept: MEDICAL GROUP | Facility: MEDICAL CENTER | Age: 40
End: 2020-03-26

## 2020-03-26 NOTE — TELEPHONE ENCOUNTER
Left message with patient about no show to appointment today 3/26/20.  Explained this was her first no show and the no show policy.

## 2020-05-01 ENCOUNTER — OFFICE VISIT (OUTPATIENT)
Dept: MEDICAL GROUP | Facility: MEDICAL CENTER | Age: 40
End: 2020-05-01
Attending: FAMILY MEDICINE
Payer: MEDICAID

## 2020-05-01 DIAGNOSIS — R35.0 URINARY FREQUENCY: ICD-10-CM

## 2020-05-01 DIAGNOSIS — E78.5 DYSLIPIDEMIA: ICD-10-CM

## 2020-05-01 DIAGNOSIS — F10.10 ALCOHOL ABUSE: ICD-10-CM

## 2020-05-01 PROCEDURE — 99213 OFFICE O/P EST LOW 20 MIN: CPT | Mod: CR | Performed by: FAMILY MEDICINE

## 2020-05-01 NOTE — PROGRESS NOTES
Telephone Appointment Visit   As a means of avoiding spread of COVID-19, this visit is being conducted by telephone. This telephone visit was initiated by the patient and they verbally consented.    Time at start of call: 10:13 am    Reason for Call:  Urgent Care/ ED Follow-up    Patient Comments / History:   Alcohol abuse  Patient states she was discharged recently on April 20 from last pill after detox program and now she is attending outpatient program.  She was told while at [facility] that she is having abnormal labs on her cholesterol and she needs to follow-up to do more lab work follow-up after discharge.  At this time she states she is feeling fine except for some chronic abdominal discomfort on and off associated with tiredness.  She states she has been sober and requesting labs.  No records available at this time from [facility].    Dyslipidemia  Patient requesting lab work to check on her cholesterol since it was found to be abnormal while her daily at [place] as mentioned above    BMI 37.0-37.9, adult  Patient states she has been trying to lose weight by changing her lifestyle exercise and diet control.  Her most recent weight is 230 with a BMI of 37.    Urinary frequency  Ports for the past few weeks she noticed that she is having increased urinary frequency, without burning sensation or pain or fever or GI symptoms.      Labs / Images Reviewed   No recent labs for review     Assessment and Plan:     1. Alcohol abuse  Chronic problem, status post recent discharge from inpatient facility with still, requesting follow-up labs patient said she has been sober and doing fine at home    - Comp Metabolic Panel; Future    2. Dyslipidemia  Check cholesterol level, chronic problem.  Discussed importance of diet control and healthy lifestyle changes  - Lipid Profile; Future  - HEMOGLOBIN A1C; Future  - Comp Metabolic Panel; Future    3. BMI 37.0-37.9, adult  Chronic problem, better control patient has been  losing weight with dietary changes and healthy lifestyle changes screen for diabetes and high cholesterol  - Lipid Profile; Future  - HEMOGLOBIN A1C; Future    4. Urinary frequency  New problem, will rule out diabetes and check urine to rule out UTI.  - HEMOGLOBIN A1C; Future  - URINALYSIS,CULTURE IF INDICATED; Future      Follow-up: No follow-ups on file.    Time at end of call: 10:19 AM  Total Time Spent: 5-10 minutes    Sonja Lin M.D.

## 2020-05-28 ENCOUNTER — TELEPHONE (OUTPATIENT)
Dept: MEDICAL GROUP | Facility: MEDICAL CENTER | Age: 40
End: 2020-05-28

## 2020-05-28 DIAGNOSIS — R31.9 URINARY TRACT INFECTION WITH HEMATURIA, SITE UNSPECIFIED: ICD-10-CM

## 2020-05-28 DIAGNOSIS — N39.0 URINARY TRACT INFECTION WITH HEMATURIA, SITE UNSPECIFIED: ICD-10-CM

## 2020-05-28 RX ORDER — NITROFURANTOIN 25; 75 MG/1; MG/1
100 CAPSULE ORAL 2 TIMES DAILY
Qty: 14 CAP | Refills: 0 | Status: SHIPPED | OUTPATIENT
Start: 2020-05-28 | End: 2021-02-18

## 2020-05-28 NOTE — TELEPHONE ENCOUNTER
Reviewed patient lab work results, urine shows evidence of urinary tract infection and positive for nitrates.  I have sent prescription of antibiotics to pharmacy.  Please notify patient to  prescription for pharmacy and increase hydration because of diagnosis of urinary tract infection.  Thank you

## 2020-06-11 DIAGNOSIS — F17.200 SMOKER: ICD-10-CM

## 2020-06-17 NOTE — ED NOTES
Pt Given discharge instructions/ home care instructions, Pt verbalized understanding of instructions given, pt ambulatory to PAT [email or web address].      No

## 2020-06-30 ENCOUNTER — OFFICE VISIT (OUTPATIENT)
Dept: MEDICAL GROUP | Facility: MEDICAL CENTER | Age: 40
End: 2020-06-30
Attending: FAMILY MEDICINE
Payer: MEDICAID

## 2020-06-30 ENCOUNTER — HOSPITAL ENCOUNTER (OUTPATIENT)
Facility: MEDICAL CENTER | Age: 40
End: 2020-06-30
Attending: FAMILY MEDICINE
Payer: MEDICAID

## 2020-06-30 ENCOUNTER — TELEPHONE (OUTPATIENT)
Dept: MEDICAL GROUP | Facility: MEDICAL CENTER | Age: 40
End: 2020-06-30

## 2020-06-30 VITALS
RESPIRATION RATE: 20 BRPM | SYSTOLIC BLOOD PRESSURE: 118 MMHG | OXYGEN SATURATION: 94 % | DIASTOLIC BLOOD PRESSURE: 76 MMHG | TEMPERATURE: 98.1 F | HEIGHT: 66 IN | BODY MASS INDEX: 39.05 KG/M2 | HEART RATE: 107 BPM | WEIGHT: 243 LBS

## 2020-06-30 DIAGNOSIS — K64.2 GRADE III HEMORRHOIDS: ICD-10-CM

## 2020-06-30 DIAGNOSIS — N39.0 URINARY TRACT INFECTION WITHOUT HEMATURIA, SITE UNSPECIFIED: ICD-10-CM

## 2020-06-30 DIAGNOSIS — M79.89 SWELLING OF RIGHT FOOT: ICD-10-CM

## 2020-06-30 DIAGNOSIS — E78.5 DYSLIPIDEMIA: ICD-10-CM

## 2020-06-30 LAB
APPEARANCE UR: NORMAL
BILIRUB UR STRIP-MCNC: NORMAL MG/DL
COLOR UR AUTO: YELLOW
GLUCOSE UR STRIP.AUTO-MCNC: NORMAL MG/DL
KETONES UR STRIP.AUTO-MCNC: NORMAL MG/DL
LEUKOCYTE ESTERASE UR QL STRIP.AUTO: NORMAL
NITRITE UR QL STRIP.AUTO: NORMAL
PH UR STRIP.AUTO: 6 [PH] (ref 5–8)
PROT UR QL STRIP: NORMAL MG/DL
RBC UR QL AUTO: NORMAL
SP GR UR STRIP.AUTO: 1.01
UROBILINOGEN UR STRIP-MCNC: 0.2 MG/DL

## 2020-06-30 PROCEDURE — 81002 URINALYSIS NONAUTO W/O SCOPE: CPT | Performed by: FAMILY MEDICINE

## 2020-06-30 PROCEDURE — 99214 OFFICE O/P EST MOD 30 MIN: CPT | Performed by: FAMILY MEDICINE

## 2020-06-30 PROCEDURE — 99213 OFFICE O/P EST LOW 20 MIN: CPT | Performed by: FAMILY MEDICINE

## 2020-06-30 RX ORDER — DOCUSATE SODIUM 100 MG/1
100 CAPSULE, LIQUID FILLED ORAL 2 TIMES DAILY
Qty: 60 CAP | Refills: 6 | Status: SHIPPED | OUTPATIENT
Start: 2020-06-30 | End: 2021-02-18

## 2020-06-30 RX ORDER — NAPROXEN 500 MG/1
500 TABLET ORAL 2 TIMES DAILY WITH MEALS
Qty: 15 TAB | Refills: 0 | Status: SHIPPED | OUTPATIENT
Start: 2020-06-30 | End: 2021-02-18

## 2020-06-30 RX ORDER — ATORVASTATIN CALCIUM 40 MG/1
TABLET, FILM COATED ORAL
Qty: 30 TAB | Refills: 6 | Status: SHIPPED | OUTPATIENT
Start: 2020-06-30 | End: 2021-03-10 | Stop reason: SDUPTHER

## 2020-06-30 RX ORDER — HYDROCORTISONE ACETATE 25 MG/1
25 SUPPOSITORY RECTAL EVERY 12 HOURS
Qty: 14 SUPPOSITORY | Refills: 0 | Status: SHIPPED | OUTPATIENT
Start: 2020-06-30 | End: 2021-02-18

## 2020-06-30 ASSESSMENT — FIBROSIS 4 INDEX: FIB4 SCORE: 0.52

## 2020-06-30 NOTE — PROGRESS NOTES
Chief Complaint:   Chief Complaint   Patient presents with   • Nodule     on R side of foot       HPI: Established patient  Tracy Calderon is a 39 y.o. female who presents for evaluation of the following concerns     Urinary tract infection without hematuria, site unspecified    Recently treated from urinary tract infection with nitrofurantoin antibiotics.  Today she reports that after completing the antibiotics use she feels ill mild symptoms of burning sensation and itching in her vaginal region.  Denies flank pain or fever or GI symptoms.      Dyslipidemia  History of dyslipidemia reviewed with the patient most recent lipid profile, elevated LDL around 200.  Patient said she stopped using atorvastatin 40 mg daily.  Would like to restart it  hemorrhoids  Reports this is a chronic old problem for more than 1 year but this is the first time she addresses this problem with me today.  Because she has been experiencing more pain and swelling in the anal region associated with some blood with bowel movements.  Patient said she also feels that there is hemorrhoids protruding from the area.    Swelling of right foot    Patient reports that she noticed this swelling on the side of her right foot around 2 days ago, she thought it might be a spider bite or an insect bite.  Because it is swollen red and painful.  Denies trauma to the area.        Past medical history, family history, social history and medications reviewed and updated in the record.  Today  Current medications, problem list and allergies reviewed in EPIC today  Health maintenance topics are reviewed and updated.    Patient Active Problem List    Diagnosis Date Noted   • Exposure to syphilis 11/27/2019   • Breast lump in female 11/27/2019   • Lipoma 11/27/2019   • Hemangioma of liver 01/11/2019   • Impaired glucose tolerance 01/02/2018   • Morbid obesity with BMI of 45.0-49.9, adult (HCC) 11/30/2017   • Chronic midline low back pain 10/10/2017   •  Obesity hypoventilation syndrome (HCC) 09/06/2017   • Binge eating disorder 07/06/2017   • Morbid obesity with BMI of 40.0-44.9, adult ([facility]) 03/07/2017   • Nocturnal sleep-related eating disorder 08/18/2015   • Bilateral edema of lower extremity 08/10/2015   • S/P nasal septoplasty 06/16/2015   • Anxiety 02/17/2015   • History of substance abuse ([facility]) 02/17/2015   • Hyperlipidemia 02/17/2015   • GERD (gastroesophageal reflux disease) 07/18/2013   • Tobacco abuse 07/18/2013   • Pain of left breast 07/18/2013     Family History   Problem Relation Age of Onset   • Lung Disease Mother    • Stroke Mother    • Heart Disease Maternal Grandfather    • Cancer Neg Hx    • Diabetes Neg Hx      Social History     Socioeconomic History   • Marital status: Single     Spouse name: Not on file   • Number of children: Not on file   • Years of education: Not on file   • Highest education level: Not on file   Occupational History   • Not on file   Social Needs   • Financial resource strain: Not on file   • Food insecurity     Worry: Not on file     Inability: Not on file   • Transportation needs     Medical: Not on file     Non-medical: Not on file   Tobacco Use   • Smoking status: Current Every Day Smoker     Packs/day: 0.25     Years: 15.00     Pack years: 3.75     Types: Cigarettes   • Smokeless tobacco: Never Used   Substance and Sexual Activity   • Alcohol use: No     Comment: h/o heavy drinking x 16 years. quit drinking in May 2013   • Drug use: Yes     Types: Marijuana, Inhaled     Comment: history of meth use, quit 2007, marijuana daily   • Sexual activity: Not Currently     Partners: Male   Lifestyle   • Physical activity     Days per week: Not on file     Minutes per session: Not on file   • Stress: Not on file   Relationships   • Social connections     Talks on phone: Not on file     Gets together: Not on file     Attends [employer] service: Not on file     Active member of club or organization: Not on file     Attends  meetings of clubs or organizations: Not on file     Relationship status: Not on file   • Intimate partner violence     Fear of current or ex partner: Not on file     Emotionally abused: Not on file     Physically abused: Not on file     Forced sexual activity: Not on file   Other Topics Concern   • Not on file   Social History Narrative   • Not on file       Current Outpatient Medications   Medication Sig Dispense Refill   • hydrocortisone (ANUSOL-HC) 25 MG Suppos Insert 1 Suppository in rectum every 12 hours. 14 Suppository 0   • docusate sodium (COLACE) 100 MG Cap Take 1 Cap by mouth 2 times a day. 60 Cap 6   • atorvastatin (LIPITOR) 40 MG Tab TAKE 1 TABLET BY MOUTH ONCE DAILY IN THE EVENING 30 Tab 6   • naproxen (NAPROSYN) 500 MG Tab Take 1 Tab by mouth 2 times a day, with meals. 15 Tab 0   • nicotine polacrilex (NICORELIEF) 2 MG Gum Take 1 Each by mouth as needed for Smoking Cessation. 60 Each 1   • nitrofurantoin (MACROBID) 100 MG Cap Take 1 Cap by mouth 2 times a day. 14 Cap 0   • cyclobenzaprine (FLEXERIL) 10 MG Tab Take 1 Tab by mouth 3 times a day as needed for Moderate Pain or Muscle Spasms. 30 Tab 0   • albuterol (VENTOLIN HFA) 108 (90 Base) MCG/ACT Aero Soln inhalation aerosol INHALE TWO PUFFS BY MOUTH EVERY 6 HOURS AS NEEDED FOR SHORTNESS OF BREATH 1 Inhaler 5   • citalopram (CELEXA) 40 MG Tab      • albuterol (PROVENTIL) 2.5mg/3ml Nebu Soln solution for nebulization 3 mL by Nebulization route every four hours as needed. AS NEEDED FOR SHORTNESS OF BREATH 360 mL 3   • clonazepam (KLONOPIN) 0.5 MG Tab Take 0.5 mg by mouth 3 times a day.     • prazosin (MINIPRESS) 5 MG Cap Take 10 mg by mouth every evening.       No current facility-administered medications for this visit.          Review Of Systems  As documented in HPI above  PHYSICAL EXAMINATION:    /76 (BP Location: Left arm, Patient Position: Sitting, BP Cuff Size: Adult)   Pulse (!) 107   Temp 36.7 °C (98.1 °F) (Temporal)   Resp 20   Ht  "1.676 m (5' 6\")   Wt 110.2 kg (243 lb)   SpO2 94%   BMI 39.22 kg/m²   Gen.: Well-developed, well-nourished, no apparent distress, pleasant and cooperative with the examination  HEENT: Normocephalic/atraumatic,     Neck: No JVD or bruits, no adenopathy  Cor: Regular rate and rhythm without murmur gallop or rub  Lungs: Clear to auscultation with equal breath sounds bilaterally. No wheezes, rhonchi.  Abdomen: Soft nontender without hepatosplenomegaly or masses appreciated, normoactive bowel sounds  Extremities: No cyanosis, clubbing or edema  Right foot on the side there is evidence of a small soft tissue swelling that is tender to touch and mildly erythematous.  Without evidence of open wound.  Most likely subcutaneous tissue soft tissue swelling  Rectal/anal exam: There is evidence of third-degree external hemorrhoids at 5:00 and 11:00.  They are not thrombosed or bleeding at this time.      ASSESSMENT/Plan:  1. Urinary tract infection without hematuria, site unspecified   repeat another urine culture, advised to increase hydration I will also send genital culture to rule out yeast infection.  Since the patient is having some itchiness.  Status post Macrobid for 1 week  URINE CULTURE(NEW)    GENITAL CULTURE, ROUTINE   2. Dyslipidemia   chronic problem, uncontrolled advised to restart the atorvastatin 40 mg daily.  atorvastatin (LIPITOR) 40 MG Tab   3. Grade III hemorrhoids   chronic problem, due to.  Advised to avoid sitting for long time in the toilet to prevent worsening of her hemorrhoids, soup stool softener to prevent constipation.  Patient to use the suppositories as directed and follow-up with a surgeon for further evaluation  REFERRAL TO GENERAL SURGERY    hydrocortisone (ANUSOL-HC) 25 MG Suppos    docusate sodium (COLACE) 100 MG Cap   4. Swelling of right foot   new problem, most likely inflammation of the soft tissue which might be related to an insect bite or minor trauma that the patient is not aware " of.  Advised to use naproxen as directed and applied ice packs, if not resolved to come for further assistance  naproxen (NAPROSYN) 500 MG Tab     Please note that this dictation was created using voice recognition software. I have made every reasonable attempt to correct obvious errors but there may be errors of grammar and content that I may have overlooked prior to finalization of this note.

## 2020-07-01 LAB
FORWARD REASON: SPWHY: NORMAL
FORWARDED TO LAB: SPWHR: NORMAL
SPECIMEN SENT (2ND): SPWT2: NORMAL
SPECIMEN SENT (3RD): SPWT3: NORMAL
SPECIMEN SENT (4TH): SPWT4: NORMAL
SPECIMEN SENT: SPWT1: NORMAL

## 2020-07-07 ENCOUNTER — TELEPHONE (OUTPATIENT)
Dept: MEDICAL GROUP | Facility: MEDICAL CENTER | Age: 40
End: 2020-07-07

## 2020-07-07 DIAGNOSIS — N39.0 URINARY TRACT INFECTION WITHOUT HEMATURIA, SITE UNSPECIFIED: ICD-10-CM

## 2020-07-07 RX ORDER — SULFAMETHOXAZOLE AND TRIMETHOPRIM 800; 160 MG/1; MG/1
1 TABLET ORAL 2 TIMES DAILY
Qty: 14 TAB | Refills: 0 | Status: SHIPPED | OUTPATIENT
Start: 2020-07-07 | End: 2021-02-18

## 2020-07-07 NOTE — TELEPHONE ENCOUNTER
Notify patient please that her urine test shows evidence of infection, I have sent prescription of Bactrim DS to her pharmacy [facility] [address] for her to start using it 1 tablet twice a day for 7 days.  Increase hydration and push fluids.  And follow-up as directed

## 2020-10-02 ENCOUNTER — APPOINTMENT (OUTPATIENT)
Dept: RADIOLOGY | Facility: IMAGING CENTER | Age: 40
End: 2020-10-02
Attending: EMERGENCY MEDICINE
Payer: MEDICAID

## 2020-10-02 ENCOUNTER — OFFICE VISIT (OUTPATIENT)
Dept: URGENT CARE | Facility: CLINIC | Age: 40
End: 2020-10-02
Payer: MEDICAID

## 2020-10-02 ENCOUNTER — HOSPITAL ENCOUNTER (OUTPATIENT)
Facility: MEDICAL CENTER | Age: 40
End: 2020-10-02
Attending: EMERGENCY MEDICINE
Payer: MEDICAID

## 2020-10-02 VITALS
WEIGHT: 245 LBS | SYSTOLIC BLOOD PRESSURE: 120 MMHG | OXYGEN SATURATION: 94 % | BODY MASS INDEX: 39.37 KG/M2 | HEART RATE: 96 BPM | RESPIRATION RATE: 20 BRPM | DIASTOLIC BLOOD PRESSURE: 76 MMHG | TEMPERATURE: 98.1 F | HEIGHT: 66 IN

## 2020-10-02 DIAGNOSIS — J98.8 RTI (RESPIRATORY TRACT INFECTION): ICD-10-CM

## 2020-10-02 DIAGNOSIS — J45.901 ASTHMA EXACERBATION IN COPD (HCC): ICD-10-CM

## 2020-10-02 DIAGNOSIS — R05.9 COUGH: ICD-10-CM

## 2020-10-02 DIAGNOSIS — J44.1 ASTHMA EXACERBATION IN COPD (HCC): ICD-10-CM

## 2020-10-02 DIAGNOSIS — R06.2 WHEEZING: ICD-10-CM

## 2020-10-02 LAB
FORWARD REASON: SPWHY: NORMAL
FORWARDED TO LAB: SPWHR: NORMAL
SPECIMEN SENT: SPWT1: NORMAL

## 2020-10-02 PROCEDURE — 99203 OFFICE O/P NEW LOW 30 MIN: CPT | Performed by: EMERGENCY MEDICINE

## 2020-10-02 PROCEDURE — 71046 X-RAY EXAM CHEST 2 VIEWS: CPT | Mod: TC | Performed by: EMERGENCY MEDICINE

## 2020-10-02 RX ORDER — TIOTROPIUM BROMIDE 18 UG/1
18 CAPSULE ORAL; RESPIRATORY (INHALATION) DAILY
Qty: 30 CAP | Refills: 3 | Status: SHIPPED | OUTPATIENT
Start: 2020-10-02 | End: 2021-03-10

## 2020-10-02 RX ORDER — ALBUTEROL SULFATE 90 UG/1
4 AEROSOL, METERED RESPIRATORY (INHALATION) ONCE
Status: COMPLETED | OUTPATIENT
Start: 2020-10-02 | End: 2020-10-02

## 2020-10-02 RX ORDER — PREDNISONE 20 MG/1
40 TABLET ORAL DAILY
Qty: 10 TAB | Refills: 0 | Status: SHIPPED | OUTPATIENT
Start: 2020-10-02 | End: 2021-02-18

## 2020-10-02 RX ADMIN — ALBUTEROL SULFATE 4 PUFF: 90 AEROSOL, METERED RESPIRATORY (INHALATION) at 09:20

## 2020-10-02 ASSESSMENT — ENCOUNTER SYMPTOMS
SORE THROAT: 1
HEMOPTYSIS: 0
SPUTUM PRODUCTION: 1
HEADACHES: 1
DIARRHEA: 1
CHILLS: 1
RHINORRHEA: 1
SHORTNESS OF BREATH: 1
MYALGIAS: 1
COUGH: 1
WHEEZING: 1
NAUSEA: 1
SWOLLEN GLANDS: 1
VOMITING: 0
ABDOMINAL PAIN: 0

## 2020-10-02 ASSESSMENT — FIBROSIS 4 INDEX: FIB4 SCORE: 0.54

## 2020-10-02 NOTE — LETTER
October 2, 2020       Patient: Tracy Calderon   YOB: 1980   Date of Visit: 10/2/2020          Return to Work Information for Patients      A concern for COVID-19 illness has been identified and testing is in progress. The results are available through our electronic delivery system called [employer].      We are asking employers to excuse absences while they follow self-isolation protocol per CDC guidelines:   • At least 10 days since symptoms first appeared and   • At least 24 hours with no fever greater than 100.4 F without fever-reducing medication and   • Symptoms have improved    If results are negative you must continue to follow the self-isolation protocol.  You may return to work when you have no fever for at least 24 hours without the use of fever-reducing medications, and symptoms have improved.      If the results of testing are positive then you will be contacted by your [facility] for further instructions on duration of self-isolation and return to work.  In general, this will also follow the CDC guidelines with a minimum of 10 days from the onset of symptoms and symptoms are improving without fever.      This is the only note that will be provided from [facility] for this visit. Please schedule a visit with a primary care provider if documents for FMLA, disability, or unemployment are required.

## 2020-10-02 NOTE — PROGRESS NOTES
Subjective:      Tracy Calderon is a 40 y.o. female who presents with Cough (sob, fever, sore throat, chest tightness x6days )            URI   This is a new problem. Episode onset: 6 days. The problem has been waxing and waning. Maximum temperature: initially, resolved. Associated symptoms include chest pain, congestion, coughing, diarrhea, ear pain, headaches, nausea, rhinorrhea, a sore throat, swollen glands and wheezing. Pertinent negatives include no abdominal pain, plugged ear sensation, rash or vomiting. She has tried decongestant and inhaler use for the symptoms. The treatment provided mild relief.       Review of Systems   Constitutional: Positive for chills and malaise/fatigue.   HENT: Positive for congestion, ear pain, rhinorrhea and sore throat. Negative for ear discharge, hearing loss and nosebleeds.    Respiratory: Positive for cough, sputum production, shortness of breath and wheezing. Negative for hemoptysis.    Cardiovascular: Positive for chest pain.        Anterior chest pain associated with coughing only   Gastrointestinal: Positive for diarrhea and nausea. Negative for abdominal pain and vomiting.   Musculoskeletal: Positive for myalgias.   Skin: Negative for rash.   Neurological: Positive for headaches.   Endo/Heme/Allergies: Positive for environmental allergies.       Past Medical History:   Diagnosis Date   • Anesthesia     panic attacks and needs Mom to be there when waking up   • Anxiety    • Asthma     will bring inhalers   • Binge eating 7/6/2017   • Bipolar disorder ([facility])    • Breath shortness    • COPD (chronic obstructive pulmonary disease) ([facility]) 2/17/2015   • Cricopharyngeal achalasia 3/12/2015   • Depression    • ETOH abuse    • Heart burn    • History of substance abuse ([facility]) 2/17/2015    ETOH-quit 2013, meth-quit 2007   • Hyperlipidemia 2/17/2015   • Hyperlipidemia 2/17/2015   • Indigestion    • Obesity hypoventilation syndrome ([facility]) 9/6/2017   • Psychiatric disorder    •  Snoring      Past Surgical History:   Procedure Laterality Date   • SEPTOPLASTY  4/24/2015    Performed by Renard Pierre M.D. at SURGERY SAME DAY [facility] ORS   • TURBINOPLASTY  4/24/2015    Performed by Renard Pierre M.D. at SURGERY SAME DAY [facility] ORS   • ESOPHAGOSCOPY  4/24/2015    Performed by Renard Pierre M.D. at SURGERY SAME DAY [facility] ORS   • TONSILLECTOMY        Allergy:  Penicillins and Vicodin [hydrocodone-acetaminophen]     Current Outpatient Medications:   •  atorvastatin, TAKE 1 TABLET BY MOUTH ONCE DAILY IN THE EVENING, Taking  •  albuterol, INHALE TWO PUFFS BY MOUTH EVERY 6 HOURS AS NEEDED FOR SHORTNESS OF BREATH, Taking  •  citalopram, , Taking  •  albuterol, 2.5 mg, Nebulization, Q4HRS PRN, Taking  •  clonazePAM, 0.5 mg, Oral, TID, Taking  •  sulfamethoxazole-trimethoprim, 1 Tab, Oral, BID (Patient not taking: Reported on 10/2/2020), Not Taking  •  hydrocortisone, 25 mg, Rectal, Q12HRS (Patient not taking: Reported on 10/2/2020), Not Taking  •  docusate sodium, 100 mg, Oral, BID (Patient not taking: Reported on 10/2/2020), Not Taking  •  naproxen, 500 mg, Oral, BID WITH MEALS (Patient not taking: Reported on 10/2/2020), Not Taking  •  nicotine polacrilex, 2 mg, Oral, PRN (Patient not taking: Reported on 10/2/2020), Not Taking  •  nitrofurantoin, 100 mg, Oral, BID (Patient not taking: Reported on 10/2/2020), Not Taking  •  cyclobenzaprine, 10 mg, Oral, TID PRN (Patient not taking: Reported on 10/2/2020), Not Taking  •  prazosin, 10 mg, Oral, Q EVENING, Not Taking   family history includes Heart Disease in her maternal grandfather; Lung Disease in her mother; Stroke in her mother.   Social History     Tobacco Use   • Smoking status: Current Every Day Smoker     Packs/day: 0.25     Years: 15.00     Pack years: 3.75     Types: Cigarettes   • Smokeless tobacco: Never Used   Substance Use Topics   • Alcohol use: No     Comment: h/o heavy drinking x 16 years. quit drinking in May 2013   • Drug  "use: Yes     Types: Marijuana, Inhaled     Comment: history of meth use, quit 2007, marijuana daily       Objective:     /76   Pulse 96   Temp 36.7 °C (98.1 °F) (Temporal)   Resp 20   Ht 1.676 m (5' 6\")   Wt 111.1 kg (245 lb)   SpO2 94%   BMI 39.54 kg/m²      Physical Exam  Constitutional:       General: She is not in acute distress.     Appearance: She is well-developed and overweight. She is not toxic-appearing.   HENT:      Head: Normocephalic.      Right Ear: Tympanic membrane and ear canal normal.      Left Ear: Tympanic membrane and ear canal normal.      Nose: Mucosal edema present. No rhinorrhea.      Mouth/Throat:      Dentition: Abnormal dentition. No dental abscesses.      Pharynx: Uvula midline. Posterior oropharyngeal erythema present. No oropharyngeal exudate or uvula swelling.   Eyes:      General: Lids are normal.      Conjunctiva/sclera: Conjunctivae normal.   Neck:      Musculoskeletal: Neck supple.      Trachea: Trachea and phonation normal.   Cardiovascular:      Rate and Rhythm: Normal rate and regular rhythm.      Heart sounds: Normal heart sounds.   Pulmonary:      Effort: Pulmonary effort is normal. No tachypnea, accessory muscle usage or respiratory distress.      Breath sounds: Wheezing present. No rhonchi or rales.   Lymphadenopathy:      Cervical: Cervical adenopathy present.      Right cervical: Superficial cervical adenopathy present.      Left cervical: Superficial cervical adenopathy present.   Skin:     General: Skin is warm and dry.   Neurological:      Mental Status: She is alert and oriented to person, place, and time.   Psychiatric:         Behavior: Behavior is cooperative.              Reevaluation after albuterol administration: Wheezes resolved.  Assessment/Plan:        1. Asthma exacerbation in COPD (HCC)  Continue albuterol  - tiotropium (SPIRIVA) 18 MCG Cap; Inhale 1 Cap by mouth every day.  Dispense: 30 Cap; Refill: 3  Rx prednisone if not controlled  ED if " worsened breathing.  2. RTI (respiratory tract infection)  Recommended supportive care measures, including rest, increasing oral fluid intake and use of over-the-counter medications for relief of symptoms.  Home isolation  3. Wheezing  - albuterol inhaler 4 Puff  4. Cough  - DX-CHEST-2 VIEWS; Interpretation per radiologist:    No acute cardiopulmonary process is identified.

## 2020-12-08 ENCOUNTER — APPOINTMENT (OUTPATIENT)
Dept: MEDICAL GROUP | Facility: CLINIC | Age: 40
End: 2020-12-08
Payer: MEDICAID

## 2020-12-08 DIAGNOSIS — J44.1 ASTHMA EXACERBATION IN COPD (HCC): ICD-10-CM

## 2020-12-08 DIAGNOSIS — J45.901 ASTHMA EXACERBATION IN COPD (HCC): ICD-10-CM

## 2020-12-08 DIAGNOSIS — E78.5 DYSLIPIDEMIA: ICD-10-CM

## 2020-12-08 RX ORDER — ATORVASTATIN CALCIUM 40 MG/1
TABLET, FILM COATED ORAL
Qty: 30 TAB | Refills: 0 | OUTPATIENT
Start: 2020-12-08

## 2020-12-08 RX ORDER — TIOTROPIUM BROMIDE 18 UG/1
18 CAPSULE ORAL; RESPIRATORY (INHALATION) DAILY
Qty: 30 CAP | Refills: 0 | OUTPATIENT
Start: 2020-12-08

## 2020-12-08 NOTE — TELEPHONE ENCOUNTER
Was the patient seen in the last year in this department?  No active SX at time of apt. Needs refill to get to next apt. Cannot do VV. She is at [facility] and refills were sent to a pharm in [place] and she cannot get time.     Does patient have an active prescription for medications requested? Yes    Received Request Via: Patient    Hospital Outpatient Visit on 10/02/2020   Component Date Value   • Forwarded to Lab: 10/02/2020 [facility]    • Forward Reason: 10/02/2020 Insurance    • Specimen Sent: 10/02/2020 Swab    Hospital Outpatient Visit on 06/30/2020   Component Date Value   • Forwarded to Lab: 07/01/2020 [facility]    • Forward Reason: 07/01/2020 Insurance    • Specimen Sent: 07/01/2020 Urine    • Specimen Sent (2nd): 07/01/2020 Swab    • Specimen Sent (3rd): 07/01/2020 Other (comment)    • Specimen Sent (4th): 07/01/2020 Other (comment)    Office Visit on 06/30/2020   Component Date Value   • POC Color 06/30/2020 yellow    • POC Appearance 06/30/2020 slightly cloudy    • POC Leukocyte Esterase 06/30/2020 neg    • POC Nitrites 06/30/2020 neg    • POC Urobiligen 06/30/2020 0.2    • POC Protein 06/30/2020 neg    • POC Urine PH 06/30/2020 6.0    • POC Blood 06/30/2020 neg    • POC Specific Gravity 06/30/2020 1.010    • POC Ketones 06/30/2020 neg    • POC Bilirubin 06/30/2020 neg    • POC Glucose 06/30/2020 neg

## 2020-12-08 NOTE — PATIENT INSTRUCTIONS
Patient was advised to take all meds as directed , and to follow up regularly , to bring all meds each time she is coming to see me, medication SE discussed  . RTC as needed   ER warnings reviewed      UCG/negative

## 2021-02-18 ENCOUNTER — OFFICE VISIT (OUTPATIENT)
Dept: URGENT CARE | Facility: CLINIC | Age: 41
End: 2021-02-18
Payer: MEDICAID

## 2021-02-18 ENCOUNTER — HOSPITAL ENCOUNTER (OUTPATIENT)
Facility: MEDICAL CENTER | Age: 41
End: 2021-02-18
Attending: NURSE PRACTITIONER
Payer: MEDICAID

## 2021-02-18 VITALS
HEIGHT: 66 IN | HEART RATE: 88 BPM | WEIGHT: 240 LBS | TEMPERATURE: 98.4 F | SYSTOLIC BLOOD PRESSURE: 124 MMHG | DIASTOLIC BLOOD PRESSURE: 74 MMHG | OXYGEN SATURATION: 98 % | RESPIRATION RATE: 16 BRPM | BODY MASS INDEX: 38.57 KG/M2

## 2021-02-18 DIAGNOSIS — J44.1 ACUTE EXACERBATION OF CHRONIC OBSTRUCTIVE PULMONARY DISEASE (COPD) (HCC): ICD-10-CM

## 2021-02-18 DIAGNOSIS — J02.9 SORE THROAT: ICD-10-CM

## 2021-02-18 DIAGNOSIS — H92.02 LEFT EAR PAIN: ICD-10-CM

## 2021-02-18 DIAGNOSIS — R51.9 ACUTE NONINTRACTABLE HEADACHE, UNSPECIFIED HEADACHE TYPE: ICD-10-CM

## 2021-02-18 DIAGNOSIS — R11.0 NAUSEA: ICD-10-CM

## 2021-02-18 DIAGNOSIS — R53.83 FATIGUE, UNSPECIFIED TYPE: ICD-10-CM

## 2021-02-18 DIAGNOSIS — J44.9 CHRONIC OBSTRUCTIVE PULMONARY DISEASE, UNSPECIFIED COPD TYPE (HCC): ICD-10-CM

## 2021-02-18 DIAGNOSIS — Z72.0 TOBACCO USER: ICD-10-CM

## 2021-02-18 DIAGNOSIS — J22 LRTI (LOWER RESPIRATORY TRACT INFECTION): ICD-10-CM

## 2021-02-18 DIAGNOSIS — Z20.818 EXPOSURE TO STREP THROAT: ICD-10-CM

## 2021-02-18 DIAGNOSIS — J22 LRTI (LOWER RESPIRATORY TRACT INFECTION): Primary | ICD-10-CM

## 2021-02-18 LAB
FLUAV RNA SPEC QL NAA+PROBE: NEGATIVE
FLUBV RNA SPEC QL NAA+PROBE: NEGATIVE
INT CON NEG: NEGATIVE
INT CON POS: POSITIVE
S PYO AG THROAT QL: NEGATIVE
SARS-COV-2 RNA RESP QL NAA+PROBE: NOTDETECTED
SPECIMEN SOURCE: NORMAL

## 2021-02-18 PROCEDURE — 99214 OFFICE O/P EST MOD 30 MIN: CPT | Mod: 25 | Performed by: NURSE PRACTITIONER

## 2021-02-18 PROCEDURE — 99406 BEHAV CHNG SMOKING 3-10 MIN: CPT | Performed by: NURSE PRACTITIONER

## 2021-02-18 PROCEDURE — 0240U HCHG SARS-COV-2 COVID-19 NFCT DS RESP RNA 3 TRGT MIC: CPT

## 2021-02-18 PROCEDURE — 87880 STREP A ASSAY W/OPTIC: CPT | Mod: QW | Performed by: NURSE PRACTITIONER

## 2021-02-18 RX ORDER — METHYLPREDNISOLONE 4 MG/1
TABLET ORAL
Qty: 21 TABLET | Refills: 0 | Status: SHIPPED | OUTPATIENT
Start: 2021-02-18 | End: 2021-03-10

## 2021-02-18 RX ORDER — AZITHROMYCIN 250 MG/1
TABLET, FILM COATED ORAL
Qty: 6 TABLET | Refills: 0 | Status: SHIPPED | OUTPATIENT
Start: 2021-02-18 | End: 2021-03-10

## 2021-02-18 RX ORDER — TRAZODONE HYDROCHLORIDE 150 MG/1
150 TABLET ORAL
COMMUNITY
Start: 2021-01-21 | End: 2023-11-26

## 2021-02-18 ASSESSMENT — ENCOUNTER SYMPTOMS
WHEEZING: 1
DIZZINESS: 0
MYALGIAS: 0
SPUTUM PRODUCTION: 1
FEVER: 0
EYE DISCHARGE: 0
COUGH: 0
SORE THROAT: 1
TINGLING: 0
ORTHOPNEA: 0
NECK PAIN: 0
EYE REDNESS: 0
SINUS PAIN: 0
SHORTNESS OF BREATH: 1
CHILLS: 0
NAUSEA: 0
FOCAL WEAKNESS: 0
ABDOMINAL PAIN: 0
HEADACHES: 1

## 2021-02-18 ASSESSMENT — LIFESTYLE VARIABLES: SUBSTANCE_ABUSE: 0

## 2021-02-18 ASSESSMENT — FIBROSIS 4 INDEX: FIB4 SCORE: 0.54

## 2021-02-18 NOTE — PROGRESS NOTES
Tracy Calderon is a 40 y.o. female who presents for Otalgia (x 2 days on (L) ear with sore throat and headache and feeling weak)      HPI this a new problem.  Tracy is a 40-year-old female with complaints of left ear pain, sore throat, headache, generalized weakness, productive cough and increased wheezing for 2 days.  Her symptoms are slowly progressively getting worse.  She has been using her albuterol nebulizer more frequently than normal.  She does not routinely use her Spiriva.  She reports that it does not seem to work well for her.  Her productive cough has produced green-yellow sputum.  She has been having some chills.  Her left ear pain radiates down into her neck.  Her throat is painful when she swallows.  She has been trying to drink a lot of cool and warm fluids which seemed to help.  She has a headache that is mild but persistent.  She has taken Tylenol to help reduce her discomfort.  She denies orthopnea, chest pain, fever.  No other aggravating or alleviating factors.  She did have an exposure to someone who had strep throat at her work.  She does use daily tobacco but has cut back in the past few days due to her cough.    Review of Systems   Constitutional: Negative for chills, fever and malaise/fatigue.   HENT: Positive for ear pain and sore throat. Negative for congestion, sinus pain and tinnitus.    Eyes: Negative for discharge and redness.   Respiratory: Positive for sputum production, shortness of breath (with exertion) and wheezing. Negative for cough.    Cardiovascular: Negative for chest pain, orthopnea and leg swelling.   Gastrointestinal: Negative for abdominal pain and nausea.   Musculoskeletal: Negative for myalgias and neck pain.   Skin: Negative for rash.   Neurological: Positive for headaches. Negative for dizziness, tingling and focal weakness.   Endo/Heme/Allergies: Negative for environmental allergies.   Psychiatric/Behavioral: Negative for substance abuse.       Allergies    Allergen Reactions   • Penicillins Hives   • Vicodin [Hydrocodone-Acetaminophen]      'WIRES ME, DO NOT LIKE HOW IT MAKES ME FEEL',HYPERACTIVITY     Past Medical History:   Diagnosis Date   • Anesthesia     panic attacks and needs Mom to be there when waking up   • Anxiety    • Asthma     will bring inhalers   • Binge eating 7/6/2017   • Bipolar disorder ([facility])    • Breath shortness    • COPD (chronic obstructive pulmonary disease) ([facility]) 2/17/2015   • Cricopharyngeal achalasia 3/12/2015   • Depression    • ETOH abuse    • Heart burn    • History of substance abuse ([facility]) 2/17/2015    ETOH-quit 2013, meth-quit 2007   • Hyperlipidemia 2/17/2015   • Hyperlipidemia 2/17/2015   • Indigestion    • Obesity hypoventilation syndrome ([facility]) 9/6/2017   • Psychiatric disorder    • Snoring      Past Surgical History:   Procedure Laterality Date   • SEPTOPLASTY  4/24/2015    Performed by Renard Pierre M.D. at [facility]   • TURBINOPLASTY  4/24/2015    Performed by Renard Pierre M.D. at [facility]   • ESOPHAGOSCOPY  4/24/2015    Performed by Renard Pierre M.D. at [facility]   • TONSILLECTOMY       Family History   Problem Relation Age of Onset   • Lung Disease Mother    • Stroke Mother    • Heart Disease Maternal Grandfather    • Cancer Neg Hx    • Diabetes Neg Hx      Social History     Tobacco Use   • Smoking status: Current Every Day Smoker     Packs/day: 0.25     Years: 15.00     Pack years: 3.75     Types: Cigarettes   • Smokeless tobacco: Never Used   Substance Use Topics   • Alcohol use: No     Comment: h/o heavy drinking x 16 years. quit drinking in May 2013     Patient Active Problem List   Diagnosis   • GERD (gastroesophageal reflux disease)   • Tobacco abuse   • Pain of left breast   • Anxiety   • History of substance abuse ([facility])   • Hyperlipidemia   • S/P nasal septoplasty   • Bilateral edema of lower extremity   • Nocturnal sleep-related eating disorder   •  "Morbid obesity with BMI of 40.0-44.9, adult (HCC)   • Binge eating disorder   • Obesity hypoventilation syndrome (HCC)   • Chronic midline low back pain   • Morbid obesity with BMI of 45.0-49.9, adult (HCC)   • Impaired glucose tolerance   • Hemangioma of liver   • Exposure to syphilis   • Breast lump in female   • Lipoma     Current Outpatient Medications on File Prior to Visit   Medication Sig Dispense Refill   • traZODone (DESYREL) 150 MG Tab      • atorvastatin (LIPITOR) 40 MG Tab TAKE 1 TABLET BY MOUTH ONCE DAILY IN THE EVENING 30 Tab 6   • citalopram (CELEXA) 40 MG Tab      • clonazepam (KLONOPIN) 0.5 MG Tab Take 0.5 mg by mouth 3 times a day.     • prazosin (MINIPRESS) 5 MG Cap Take 10 mg by mouth every evening.     • tiotropium (SPIRIVA) 18 MCG Cap Inhale 1 Cap by mouth every day. 30 Cap 3   • albuterol (VENTOLIN HFA) 108 (90 Base) MCG/ACT Aero Soln inhalation aerosol INHALE TWO PUFFS BY MOUTH EVERY 6 HOURS AS NEEDED FOR SHORTNESS OF BREATH 1 Inhaler 5   • albuterol (PROVENTIL) 2.5mg/3ml Nebu Soln solution for nebulization 3 mL by Nebulization route every four hours as needed. AS NEEDED FOR SHORTNESS OF BREATH 360 mL 3     No current facility-administered medications on file prior to visit.          Objective:     /74 (BP Location: Right arm, Patient Position: Sitting, BP Cuff Size: Adult long)   Pulse 88   Temp 36.9 °C (98.4 °F) (Temporal)   Resp 16   Ht 1.676 m (5' 6\")   Wt 109 kg (240 lb)   SpO2 98%   BMI 38.74 kg/m²     Physical Exam  Vitals and nursing note reviewed.   Constitutional:       General: She is not in acute distress.     Appearance: Normal appearance. She is well-developed. She is obese. She is ill-appearing. She is not toxic-appearing.   HENT:      Head: Normocephalic.      Right Ear: Hearing, ear canal and external ear normal. No middle ear effusion. Tympanic membrane is injected. Tympanic membrane is not erythematous.      Left Ear: Hearing, ear canal and external ear normal. "  No middle ear effusion. Tympanic membrane is injected. Tympanic membrane is not erythematous.      Nose: Rhinorrhea present. No mucosal edema.      Right Sinus: No maxillary sinus tenderness or frontal sinus tenderness.      Left Sinus: No maxillary sinus tenderness or frontal sinus tenderness.      Mouth/Throat:      Mouth: Mucous membranes are moist.      Pharynx: Uvula midline. Posterior oropharyngeal erythema present. No oropharyngeal exudate.      Tonsils: No tonsillar abscesses.   Eyes:      Pupils: Pupils are equal, round, and reactive to light.   Neck:      Trachea: Trachea normal.   Cardiovascular:      Rate and Rhythm: Normal rate and regular rhythm.      Chest Wall: PMI is not displaced.      Pulses: Normal pulses.      Heart sounds: Normal heart sounds.   Pulmonary:      Effort: Pulmonary effort is normal. No respiratory distress.      Breath sounds: Wheezing and rhonchi present. No decreased breath sounds or rales.   Abdominal:      Palpations: Abdomen is soft.      Tenderness: There is no abdominal tenderness.   Musculoskeletal:         General: Normal range of motion.      Cervical back: Full passive range of motion without pain, normal range of motion and neck supple.   Lymphadenopathy:      Cervical: No cervical adenopathy.      Upper Body:      Right upper body: No supraclavicular adenopathy.      Left upper body: No supraclavicular adenopathy.   Skin:     General: Skin is warm and dry.      Capillary Refill: Capillary refill takes less than 2 seconds.   Neurological:      Mental Status: She is alert and oriented to person, place, and time.      Gait: Gait normal.   Psychiatric:         Mood and Affect: Mood normal.         Speech: Speech normal.         Behavior: Behavior normal.         Thought Content: Thought content normal.     Strep: negative     Assessment /Associated Orders:      1. LRTI (lower respiratory tract infection)  CoV-2 and Flu A/B by PCR (24 hour In-House): Collect NP swab in  VTM    azithromycin (ZITHROMAX) 250 MG Tab    methylPREDNISolone (MEDROL DOSEPAK) 4 MG Tablet Therapy Pack   2. Acute exacerbation of chronic obstructive pulmonary disease (COPD) (HCC)     3. Sore throat  CoV-2 and Flu A/B by PCR (24 hour In-House): Collect NP swab in VTM    POCT Rapid Strep A   4. Exposure to strep throat     5. Left ear pain  CoV-2 and Flu A/B by PCR (24 hour In-House): Collect NP swab in VTM   6. Fatigue, unspecified type  CoV-2 and Flu A/B by PCR (24 hour In-House): Collect NP swab in VTM   7. Nausea     8. Tobacco user     9. Chronic obstructive pulmonary disease, unspecified COPD type (HCC)  methylPREDNISolone (MEDROL DOSEPAK) 4 MG Tablet Therapy Pack   10. Acute nonintractable headache, unspecified headache type  CoV-2 and Flu A/B by PCR (24 hour In-House): Collect NP swab in VTM         Medical Decision Making:    Pt is clinically stable at today's acute urgent care visit.  No acute distress noted. Appropriate for outpatient management at this time.     Educated in proper administration of medication(s) ordered today including safety, possible SE, risks, benefits, rationale and alternatives to therapy.  Do not use OTC NSAIDS while taking Medrol marissa.     OTC  analgesic of choice (acetaminophen or NSAID). Follow manufactures dosing and safety precautions.       Resume all prior  RX medications. Take as prescribed.     COVID PCR testing - pending- (results to be released to [facility] if available)   Self Quarantine per CDC guidelines  Educated in infection control practices.   Discussed that this illness was viral in nature. Did not see any evidence of a bacterial process.   OTC  analgesic/ antipyretic of choice ( acetaminophen or NSAID). Follow manufactures dosing and safety precautions.   OTC medications for symptomatic relief of symptoms  Keep well hydrated  Increase rest  Advised the patient to follow-up with the primary care physician for recheck, reevaluation, and consideration of further  management if necessary.   Discussed management options (risks,benefits, and alternatives to treatment). Pt expresses understanding and the treatment plan was agreed upon. Questions were encouraged and answered to pt's satisfaction.   Return to urgent care prn if new or worsening sx or if there is no improvement in condition prn . Red flags discussed and indications to immediately call 911 or present to the Emergency Department.       > 3 minutes was spent in educating pt of  health risks  associated with tobacco use of any form. Educated on 1-800-QUIT-NOW smoking cessation line and [facility] tobacco cessation program for assistance and support.       I personally reviewed prior external notes and test results pertinent to today's visit.  I have independently reviewed and interpreted all diagnostics ordered during this urgent care acute visit.   Time spent evaluating this patient was at least 30 minutes and includes preparing for visit, counseling/education, exam and evaluation, obtaining history, independent interpretation, ordering lab/test/procedures and medication management.          Please note that this dictation was created using voice recognition software. I have made a reasonable attempt to correct obvious errors, but I expect that there are errors of grammar and possibly content that I did not discover before finalizing the note.    This note was electronically signed by Britney CAR, Urgent Care

## 2021-03-10 ENCOUNTER — OFFICE VISIT (OUTPATIENT)
Dept: MEDICAL GROUP | Facility: MEDICAL CENTER | Age: 41
End: 2021-03-10
Attending: INTERNAL MEDICINE
Payer: MEDICAID

## 2021-03-10 VITALS
BODY MASS INDEX: 40.02 KG/M2 | WEIGHT: 249 LBS | TEMPERATURE: 98.1 F | DIASTOLIC BLOOD PRESSURE: 70 MMHG | SYSTOLIC BLOOD PRESSURE: 108 MMHG | OXYGEN SATURATION: 94 % | HEIGHT: 66 IN | HEART RATE: 86 BPM | RESPIRATION RATE: 16 BRPM

## 2021-03-10 DIAGNOSIS — J44.9 CHRONIC OBSTRUCTIVE PULMONARY DISEASE, UNSPECIFIED COPD TYPE (HCC): ICD-10-CM

## 2021-03-10 DIAGNOSIS — Z23 NEED FOR VACCINATION: ICD-10-CM

## 2021-03-10 DIAGNOSIS — M54.42 CHRONIC LEFT-SIDED LOW BACK PAIN WITH LEFT-SIDED SCIATICA: ICD-10-CM

## 2021-03-10 DIAGNOSIS — G89.29 CHRONIC LEFT-SIDED LOW BACK PAIN WITH LEFT-SIDED SCIATICA: ICD-10-CM

## 2021-03-10 DIAGNOSIS — E78.5 DYSLIPIDEMIA: ICD-10-CM

## 2021-03-10 DIAGNOSIS — Z72.0 TOBACCO ABUSE: ICD-10-CM

## 2021-03-10 PROBLEM — N63.0 BREAST LUMP IN FEMALE: Status: RESOLVED | Noted: 2019-11-27 | Resolved: 2021-03-10

## 2021-03-10 PROBLEM — F50.81 BINGE EATING DISORDER: Status: RESOLVED | Noted: 2017-07-06 | Resolved: 2021-03-10

## 2021-03-10 PROBLEM — R73.02 IMPAIRED GLUCOSE TOLERANCE: Status: RESOLVED | Noted: 2018-01-02 | Resolved: 2021-03-10

## 2021-03-10 PROBLEM — Z20.2 EXPOSURE TO SYPHILIS: Status: RESOLVED | Noted: 2019-11-27 | Resolved: 2021-03-10

## 2021-03-10 PROBLEM — E66.2 OBESITY HYPOVENTILATION SYNDROME (HCC): Status: RESOLVED | Noted: 2017-09-06 | Resolved: 2021-03-10

## 2021-03-10 PROBLEM — D17.9 LIPOMA: Status: RESOLVED | Noted: 2019-11-27 | Resolved: 2021-03-10

## 2021-03-10 PROBLEM — E66.01 MORBID OBESITY WITH BMI OF 45.0-49.9, ADULT (HCC): Status: RESOLVED | Noted: 2017-11-30 | Resolved: 2021-03-10

## 2021-03-10 PROCEDURE — 99204 OFFICE O/P NEW MOD 45 MIN: CPT | Performed by: INTERNAL MEDICINE

## 2021-03-10 PROCEDURE — 90686 IIV4 VACC NO PRSV 0.5 ML IM: CPT

## 2021-03-10 PROCEDURE — 99213 OFFICE O/P EST LOW 20 MIN: CPT | Mod: 25 | Performed by: INTERNAL MEDICINE

## 2021-03-10 RX ORDER — CLINDAMYCIN HYDROCHLORIDE 300 MG/1
300 CAPSULE ORAL 3 TIMES DAILY
COMMUNITY
Start: 2020-12-30 | End: 2021-03-10

## 2021-03-10 RX ORDER — INHALER, ASSIST DEVICES
1 SPACER (EA) MISCELLANEOUS ONCE
Qty: 1 EACH | Refills: 0 | Status: SHIPPED | OUTPATIENT
Start: 2021-03-10 | End: 2021-03-10

## 2021-03-10 RX ORDER — CHLORHEXIDINE GLUCONATE ORAL RINSE 1.2 MG/ML
SOLUTION DENTAL
COMMUNITY
Start: 2020-12-30 | End: 2021-03-10

## 2021-03-10 RX ORDER — ATORVASTATIN CALCIUM 40 MG/1
TABLET, FILM COATED ORAL
Qty: 30 TABLET | Refills: 11 | Status: SHIPPED | OUTPATIENT
Start: 2021-03-10 | End: 2021-06-09

## 2021-03-10 RX ORDER — CLONAZEPAM 1 MG/1
1 TABLET ORAL 3 TIMES DAILY
COMMUNITY
Start: 2021-01-21 | End: 2023-11-26

## 2021-03-10 ASSESSMENT — FIBROSIS 4 INDEX: FIB4 SCORE: 0.54

## 2021-03-10 ASSESSMENT — PATIENT HEALTH QUESTIONNAIRE - PHQ9: CLINICAL INTERPRETATION OF PHQ2 SCORE: 0

## 2021-03-10 NOTE — LETTER
[facility]  Ester Srinivasan M.D.  [address]  [place] [address]  Fax: [phone]   Authorization for Release/Disclosure of   Protected Health Information   Name: TRACY FAYE DELMY : 1980 SSN: [id number]   Address: [address] [address]  [place] [address] Phone:    [phone] (home)    I authorize the entity listed below to release/disclose the PHI below to:   [facility]/Ester Srinivasan M.D. and Ester Srinivasan M.D.   Provider or Entity Name:  [facility]    Address   City, State, Zip   Phone:      Fax:     Reason for request: continuity of care   Information to be released:    [  ] LAST COLONOSCOPY,  including any PATH REPORT and follow-up  [  ] LAST FIT/COLOGUARD RESULT [  ] LAST DEXA  [  ] LAST MAMMOGRAM  [  ] LAST PAP  [  ] LAST LABS [  ] RETINA EXAM REPORT  [  ] IMMUNIZATION RECORDS  [  ] Release all info      [  ] Check here and initial the line next to each item to release ALL health information INCLUDING  _____ Care and treatment for drug and / or alcohol abuse  _____ HIV testing, infection status, or AIDS  _____ Genetic Testing    DATES OF SERVICE OR TIME PERIOD TO BE DISCLOSED: _____________  I understand and acknowledge that:  * This Authorization may be revoked at any time by you in writing, except if your health information has already been used or disclosed.  * Your health information that will be used or disclosed as a result of you signing this authorization could be re-disclosed by the recipient. If this occurs, your re-disclosed health information may no longer be protected by State or Federal laws.  * You may refuse to sign this Authorization. Your refusal will not affect your ability to obtain treatment.  * This Authorization becomes effective upon signing and will  on (date) __________.      If no date is indicated, this Authorization will  one (1) year from the signature date.    Name: Tracy Faye Delmy    Signature:   Date:     3/10/2021            PLEASE FAX REQUESTED RECORDS BACK TO: [phone]

## 2021-03-10 NOTE — ASSESSMENT & PLAN NOTE
States that for the past 2 months she has had some pain over the left lower back with radiation down the back of her left leg.  It comes and goes and is worse with walking.  She has tried Tylenol as well as heat which helps.  She denies numbness, tingling, weakness in her legs.  She has never injured her back in the past.  She has never done physical therapy or had imaging done of her back.

## 2021-03-11 NOTE — PROGRESS NOTES
Tracy Calderon is a 40 y.o. female here for back pain, medication refills, est care  HPI:  Previous PCP was Dr. Lin  Chronic left-sided low back pain with left-sided sciatica  States that for the past 2 months she has had some pain over the left lower back with radiation down the back of her left leg.  It comes and goes and is worse with walking.  She has tried Tylenol as well as heat which helps.  She denies numbness, tingling, weakness in her legs.  She has never injured her back in the past.  She has never done physical therapy or had imaging done of her back.     Tobacco abuse  She currently smokes about 1 pack/day.  She is not ready to quit at this time.    Hyperlipidemia  She reports a strong family history of hyperlipidemia.  In the past, she has been on atorvastatin 40 mg daily.  States she recently had labs done through [facility] and her LDL was 105.  We do not have these for review as they were ordered by her psychiatrist however we have requested them.    Chronic obstructive pulmonary disease (HCC)  She has a long history of COPD/asthma.  She is a current 1 pack/day smoker.  She reports daily albuterol use.  She typically takes her inhaler right before bedtime.  She will rarely use her albuterol nebulizer.  In the past she was given a prescription for Spiriva but did not use it regularly as she felt like it made her symptoms worse.  Reports some mild shortness of breath with exertion but states she can easily walk a block without having to stop and rest.    Current medicines (including changes today)  Current Outpatient Medications   Medication Sig Dispense Refill   • atorvastatin (LIPITOR) 40 MG Tab TAKE 1 TABLET BY MOUTH ONCE DAILY IN THE EVENING 30 tablet 11   • Spacer/Aero-Holding Chambers (AEROCHAMBER MV) Misc 1 Each one time for 1 dose. 1 Each 0   • traZODone (DESYREL) 150 MG Tab      • albuterol (VENTOLIN HFA) 108 (90 Base) MCG/ACT Aero Soln inhalation aerosol INHALE TWO PUFFS BY MOUTH  EVERY 6 HOURS AS NEEDED FOR SHORTNESS OF BREATH 1 Inhaler 5   • citalopram (CELEXA) 40 MG Tab      • albuterol (PROVENTIL) 2.5mg/3ml Nebu Soln solution for nebulization 3 mL by Nebulization route every four hours as needed. AS NEEDED FOR SHORTNESS OF BREATH 360 mL 3   • prazosin (MINIPRESS) 5 MG Cap Take 10 mg by mouth every evening.     • clonazePAM (KLONOPIN) 1 MG Tab        No current facility-administered medications for this visit.     She  has a past medical history of Anxiety, Asthma, COPD (chronic obstructive pulmonary disease) ([facility]) (2/17/2015), Cricopharyngeal achalasia (3/12/2015), Depression, ETOH abuse, GERD (gastroesophageal reflux disease), History of substance abuse ([facility]) (2/17/2015), Hyperlipidemia (2/17/2015), and Hyperlipidemia (2/17/2015).  She  has a past surgical history that includes tonsillectomy; septoplasty (4/24/2015); turbinoplasty (4/24/2015); and esophagoscopy (4/24/2015).  Social History     Tobacco Use   • Smoking status: Current Every Day Smoker     Packs/day: 1.00     Years: 25.00     Pack years: 25.00     Types: Cigarettes   • Smokeless tobacco: Never Used   Substance Use Topics   • Alcohol use: No     Comment: history of alcoholism, quit Dec 2020.  1 pint vodka daily since age 14   • Drug use: Not Currently     Types: Marijuana, Inhaled     Comment: history of meth use, quit 2007 never IV, marijuana  until Dec 2020     Social History     Social History Narrative   • Not on file     Family History   Problem Relation Age of Onset   • Lung Disease Mother    • Stroke Mother    • Thyroid Mother         Graves disease   • Hyperlipidemia Mother    • Heart Disease Maternal Grandfather    • Thyroid Maternal Uncle         Graves disease   • Cancer Neg Hx    • Diabetes Neg Hx          ROS  As above in HPI  All other systems reviewed and are negative     Objective:     Vitals:    03/10/21 1438   BP: 108/70   Pulse: 86   Resp: 16   Temp: 36.7 °C (98.1 °F)   SpO2: 94%     Body mass index is  40.21 kg/m².  Physical Exam:    Constitutional: Alert, no distress.  Skin: Warm, dry, good turgor, no rashes in visible areas.  Eye: Equal, round and reactive, conjunctiva clear, lids normal.  ENMT: Lips without lesions, fair dentition, oropharynx clear, TM's clear bilaterally.  Neck: Trachea midline, no masses, no thyromegaly. No cervical or supraclavicular lymphadenopathy.  Respiratory: Unlabored respiratory effort,bilateral inspiratory wheezes more prominent in upper lung fields  Cardiovascular: Regular rate and rhythm, no murmurs appreciated, no lower extremity edema.  Abdomen: Soft, obese, non-tender, no masses, no hepatosplenomegaly.  Psych: Alert and oriented x3, normal affect and mood.  MSK: Positive straight leg raise on the left which reproduces shooting pain down back of left leg, mild tenderness to palpation over left lumbar paraspinal region and left SI joint  Neuro: 5/5 bilateral lower extremity strength, 1+ patellar reflexes bilaterally, normal gait, sensation to light touch intact over bilateral lower extremities      Assessment and Plan:   The following treatment plan was discussed    1. Dyslipidemia  Stable, cont current meds  -recent labs requested from [facility]  - atorvastatin (LIPITOR) 40 MG Tab; TAKE 1 TABLET BY MOUTH ONCE DAILY IN THE EVENING  Dispense: 30 tablet; Refill: 11    2. Chronic left-sided low back pain with left-sided sciatica  With positive straight leg raise on exam but symptoms are not extremely bothersome to patient.  We will get an x-ray as initial evaluation and have her see physical therapy.  If symptoms worsen and/or she does not benefit from PT would consider MRI  - DX-LUMBAR SPINE-2 OR 3 VIEWS; Future  - REFERRAL TO PHYSICAL THERAPY    3. Need for vaccination  - Influenza Vaccine Quad Injection (PF)    4. Tobacco abuse  She is not interested in quitting at this time.  We will continue to encourage cessation.    5. Chronic obstructive pulmonary disease, unspecified COPD type  (HCC)  She would like to continue her current inhaler regimen.  We discussed the importance of smoking cessation and also that if her breathing starts to worsen we could start her on a daily controller inhaler.  Will provide with a spacer as she reports her albuterol is not working as well  -cont albuterol HFA PRN  - Spacer/Aero-Holding Chambers (AEROCHAMBER MV) Misc; 1 Each one time for 1 dose.  Dispense: 1 Each; Refill: 0        Followup: Return if symptoms worsen or fail to improve.

## 2021-03-11 NOTE — ASSESSMENT & PLAN NOTE
She has a long history of COPD/asthma.  She is a current 1 pack/day smoker.  She reports daily albuterol use.  She typically takes her inhaler right before bedtime.  She will rarely use her albuterol nebulizer.  In the past she was given a prescription for Spiriva but did not use it regularly as she felt like it made her symptoms worse.  Reports some mild shortness of breath with exertion but states she can easily walk a block without having to stop and rest.

## 2021-03-11 NOTE — ASSESSMENT & PLAN NOTE
She reports a strong family history of hyperlipidemia.  In the past, she has been on atorvastatin 40 mg daily.  States she recently had labs done through [facility] and her LDL was 105.  We do not have these for review as they were ordered by her psychiatrist however we have requested them.

## 2021-04-08 ENCOUNTER — HOSPITAL ENCOUNTER (EMERGENCY)
Facility: MEDICAL CENTER | Age: 41
End: 2021-04-08
Attending: EMERGENCY MEDICINE
Payer: MEDICAID

## 2021-04-08 VITALS
OXYGEN SATURATION: 93 % | SYSTOLIC BLOOD PRESSURE: 102 MMHG | RESPIRATION RATE: 20 BRPM | BODY MASS INDEX: 41.48 KG/M2 | HEIGHT: 65 IN | HEART RATE: 58 BPM | WEIGHT: 249 LBS | TEMPERATURE: 97.7 F | DIASTOLIC BLOOD PRESSURE: 61 MMHG

## 2021-04-08 DIAGNOSIS — T50.Z95A VACCINE REACTION, INITIAL ENCOUNTER: ICD-10-CM

## 2021-04-08 LAB
GLUCOSE BLD-MCNC: 130 MG/DL (ref 65–99)
S PYO DNA SPEC NAA+PROBE: NOT DETECTED

## 2021-04-08 PROCEDURE — 700102 HCHG RX REV CODE 250 W/ 637 OVERRIDE(OP): Performed by: EMERGENCY MEDICINE

## 2021-04-08 PROCEDURE — 700111 HCHG RX REV CODE 636 W/ 250 OVERRIDE (IP): Performed by: EMERGENCY MEDICINE

## 2021-04-08 PROCEDURE — 82962 GLUCOSE BLOOD TEST: CPT

## 2021-04-08 PROCEDURE — 99284 EMERGENCY DEPT VISIT MOD MDM: CPT

## 2021-04-08 PROCEDURE — 87651 STREP A DNA AMP PROBE: CPT

## 2021-04-08 PROCEDURE — A9270 NON-COVERED ITEM OR SERVICE: HCPCS | Performed by: EMERGENCY MEDICINE

## 2021-04-08 RX ORDER — ONDANSETRON 4 MG/1
4 TABLET, ORALLY DISINTEGRATING ORAL ONCE
Status: COMPLETED | OUTPATIENT
Start: 2021-04-08 | End: 2021-04-08

## 2021-04-08 RX ORDER — IBUPROFEN 600 MG/1
600 TABLET ORAL ONCE
Status: COMPLETED | OUTPATIENT
Start: 2021-04-08 | End: 2021-04-08

## 2021-04-08 RX ADMIN — ONDANSETRON 4 MG: 4 TABLET, ORALLY DISINTEGRATING ORAL at 14:13

## 2021-04-08 RX ADMIN — IBUPROFEN 600 MG: 600 TABLET, FILM COATED ORAL at 14:13

## 2021-04-08 ASSESSMENT — PAIN DESCRIPTION - PAIN TYPE: TYPE: ACUTE PAIN

## 2021-04-08 ASSESSMENT — LIFESTYLE VARIABLES: DO YOU DRINK ALCOHOL: NO

## 2021-04-08 ASSESSMENT — FIBROSIS 4 INDEX: FIB4 SCORE: 0.54

## 2021-04-08 NOTE — ED NOTES
Patient discharged in stable condition per orders. Wristband removed per protocol. Patient verbalized understanding of all discharge instructions. All belongings accounted for. Ambulatory to lobby with steady gait accompanied by ED RN.

## 2021-04-08 NOTE — DISCHARGE INSTRUCTIONS
Suspect, given the timing, that your symptoms are related to your Covid vaccine.  Typically, these are short-lived lasting 1 to 2 days before resolution.  I would recommend that you drink plenty of fluids and rest.

## 2021-04-08 NOTE — ED TRIAGE NOTES
"Chief Complaint   Patient presents with   • Lightheadedness     Pt recieved the COVID vaccine today and immediately felt lightheaded and \"foggy\".    • Fatigue     progressively worse over the last week.    • Sore Throat     x 1 week.    • Ear Pain     left ear.      Pt denies any known COVID exposure or test within the last 14 days. FSBS in triage was 130.   /82   Pulse 74   Temp 36.1 °C (96.9 °F) (Temporal)   Resp 12   Ht 1.651 m (5' 5\")   Wt 113 kg (249 lb)   SpO2 94%   BMI 41.44 kg/m²   Pt placed back in lobby, educated on triage process, and told to inform staff of any change in condition.     "

## 2021-04-08 NOTE — ED PROVIDER NOTES
"ED Provider Note    ED Provider Note    Primary care provider: Ester Srinivasan M.D.  Means of arrival: EMS  History obtained from: Patient  History limited by: None    CHIEF COMPLAINT  Chief Complaint   Patient presents with   • Lightheadedness     Pt recieved the COVID vaccine today and immediately felt lightheaded and \"foggy\".    • Fatigue     progressively worse over the last week.    • Sore Throat     x 1 week.    • Ear Pain     left ear.        HPI  Tracy Calderon is a 40 y.o. female who presents to the Emergency Department chief complaint of sleeping more over the last few weeks and generally not feeling well for the last 2 days.  She states that she is living at [facility] at this time.  She went for her Covid vaccine today.  This was apparently, the [employer] type vaccine per her report and she was not told that she needed a repeat.  About 5 minutes afterwards, she noted feeling weak.  To her knowledge, she never had Covid.  He returned to class after her vaccine where classmates, noted that she look like she did not feel well.  Patient reports that she has had left ear pain and a sore throat for the last few days.  She has not had a fever.  No vomiting or diarrhea.  She reports nausea since receiving the vaccine, she also developed a headache after getting the vaccine.  She denies any urinary complaints and no vaginal complaints.  She has not been sexually active recently and knows of no possible exposures to any sexually transmitted disease or the possibility of pregnancy.  No chest pain or shortness of breath.    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    SURGICAL HISTORY   has a past surgical history that includes tonsillectomy; septoplasty (4/24/2015); turbinoplasty (4/24/2015); and esophagoscopy (4/24/2015).    SOCIAL HISTORY  Social History     Tobacco Use   • Smoking status: Current Every Day Smoker     Packs/day: 1.00     Years: 25.00     Pack years: 25.00     Types: Cigarettes   • Smokeless tobacco: Never Used   Substance Use Topics   • Alcohol use: No     Comment: history of alcoholism, quit Dec 2020.  1 pint vodka daily since age 14   • Drug use: Not Currently     Types: Marijuana, Inhaled     Comment: history of meth use, quit 2007 never IV, marijuana  until Dec 2020      Social History     Substance and Sexual Activity   Drug Use Not Currently   • Types: Marijuana, Inhaled    Comment: history of meth use, quit 2007 never IV, marijuana  until Dec 2020       FAMILY HISTORY  Family History   Problem Relation Age of Onset   • Lung Disease Mother    • Stroke Mother    • Thyroid Mother         Graves disease   • Hyperlipidemia Mother    • Heart Disease Maternal Grandfather    • Thyroid Maternal Uncle         Graves disease   • Cancer Neg Hx    • Diabetes Neg Hx        CURRENT MEDICATIONS  Home Medications     Reviewed by Maxine Harrison R.N. (Registered Nurse) on 04/08/21 at 1221  Med List Status: Complete   Medication Last Dose Status   albuterol (PROVENTIL) 2.5mg/3ml Nebu Soln solution for nebulization  Active   albuterol (VENTOLIN HFA) 108 (90 Base) MCG/ACT Aero Soln inhalation aerosol  Active   atorvastatin (LIPITOR) 40 MG Tab  Active   citalopram (CELEXA) 40 MG Tab  Active   clonazePAM (KLONOPIN) 1 MG Tab  Active   prazosin (MINIPRESS) 5 MG Cap  Active   traZODone (DESYREL) 150 MG Tab  Active                ALLERGIES  Allergies   Allergen Reactions   • Penicillin G    • Penicillins Hives   • Vicodin [Hydrocodone-Acetaminophen]      'WIRES ME, DO NOT LIKE HOW IT MAKES ME FEEL',HYPERACTIVITY       PHYSICAL EXAM  VITAL SIGNS: BP (!) 94/67    "Pulse (!) 56   Temp 36.1 °C (96.9 °F) (Temporal)   Resp 13   Ht 1.651 m (5' 5\")   Wt 113 kg (249 lb)   SpO2 95%   BMI 41.44 kg/m²   Vitals reviewed.  Constitutional: Patient is oriented to person, place, and time. Appears well-developed and well-nourished.  Mild distress.    Head: Normocephalic and atraumatic.   Ears: Normal external ears bilaterally.  TMs are normal bilaterally.  Mouth/Throat: Oropharynx is clear, there is mild erythema but no asymmetry and no  exudates.   Eyes: Conjunctivae are normal. Pupils are equal, round, and reactive to light.   Neck: Normal range of motion. Neck supple.  Cardiovascular: Normal rate, regular rhythm and normal heart sounds.   Pulmonary/Chest: Effort normal and breath sounds normal. No respiratory distress, no wheezes, rhonchi, or rales.   Abdominal: Soft. Bowel sounds are normal. There is no tenderness. No rebound or guarding, or peritoneal signs.   Musculoskeletal: No edema and no tenderness.   Lymphadenopathy: + bilateral cervical adenopathy.   Neurological: No focal deficits.   Skin: Skin is warm and dry. No erythema. No pallor.   Psychiatric: Patient has a normal mood and affect.     LABS  Results for orders placed or performed during the hospital encounter of 04/08/21   Group A Strep by PCR    Specimen: Throat   Result Value Ref Range    Group A Strep by PCR Not Detected Not Detected   POCT glucose device results   Result Value Ref Range    Glucose - Accu-Ck 130 (H) 65 - 99 mg/dL       All labs reviewed by me.    COURSE & MEDICAL DECISION MAKING  Pertinent Labs & Imaging studies reviewed. (See chart for details)    Obtained and reviewed past medical records.  Since last encounter was an outpatient visit in March of this year with history of chronic left-sided low back pain and sciatica, she also has a history of COPD and hyperlipidemia.  Last ED encounter was in November 2019 for chest pain, shoulder pain and back pain.    2:08 PM - Patient seen and examined at " bedside.  This is a pleasant 40-year-old female who had been feeling tired recently.  She is living at a house for substance abuse recovery.  She received her Covid vaccine today and just minutes afterward, began feeling poorly.  I have collected a strep swab.  She will be treated with oral Zofran as well as NSAIDs.  Given the timing, I suspect, new symptoms are likely related to her Covid injection.    Patient's reevaluated the bedside.  Strep swab is negative.  Given the timing of her symptoms, I do suspect, that it is likely related to the vaccine.  We discussed of the body's normal human response to this type of thing.  I suspect that it may last 1 to 2 days before resolution.  I recommended that she rest and drink plenty of fluids.  Patient is agreeable to this plan of care.  She is well-appearing and nontoxic.  She will be discharged home in stable condition.    FINAL IMPRESSION  1. Vaccine reaction, initial encounter

## 2021-04-08 NOTE — ED NOTES
Patient from [facility] to [facility] 63 ambulatory with steady gait accompanied by ED RN. Chart up for ERP.

## 2021-04-08 NOTE — ED NOTES
"Patient A & O x 4, GCS 15. States that she has been feeling poorly ever since receiving her COVID vaccine. Endorses headache 10/10 controlled to 7/10 with Tylenol taken at home. + nausea, fatigue, weakness, sore throat, and overall feeling \"not well.\" Denies vomiting/diarrhea.    Changed into gown and attached to full vitals monitoring. Assisted into position of comfort on [place]. Warm blanket provided. Call bell within reach.Lights dimmed before leaving room for patient comfort per request.  "

## 2021-04-08 NOTE — ED NOTES
Patient states that nausea has completely resolved and that headache has improved to 5/10 since receiving medication per orders.

## 2021-04-13 ENCOUNTER — APPOINTMENT (OUTPATIENT)
Dept: RADIOLOGY | Facility: MEDICAL CENTER | Age: 41
End: 2021-04-13
Attending: PSYCHIATRY & NEUROLOGY
Payer: MEDICAID

## 2021-04-13 ENCOUNTER — APPOINTMENT (OUTPATIENT)
Dept: RADIOLOGY | Facility: MEDICAL CENTER | Age: 41
End: 2021-04-13
Attending: EMERGENCY MEDICINE
Payer: MEDICAID

## 2021-04-13 ENCOUNTER — HOSPITAL ENCOUNTER (OUTPATIENT)
Facility: MEDICAL CENTER | Age: 41
End: 2021-04-14
Attending: EMERGENCY MEDICINE | Admitting: INTERNAL MEDICINE
Payer: MEDICAID

## 2021-04-13 PROBLEM — R51.9 NEW ONSET HEADACHE: Status: ACTIVE | Noted: 2021-04-13

## 2021-04-13 LAB
ALBUMIN SERPL BCP-MCNC: 3.9 G/DL (ref 3.2–4.9)
ALBUMIN/GLOB SERPL: 1.4 G/DL
ALP SERPL-CCNC: 63 U/L (ref 30–99)
ALT SERPL-CCNC: 25 U/L (ref 2–50)
ANION GAP SERPL CALC-SCNC: 8 MMOL/L (ref 7–16)
APPEARANCE UR: CLEAR
AST SERPL-CCNC: 31 U/L (ref 12–45)
BACTERIA #/AREA URNS HPF: ABNORMAL /HPF
BASOPHILS # BLD AUTO: 0.6 % (ref 0–1.8)
BASOPHILS # BLD: 0.04 K/UL (ref 0–0.12)
BILIRUB SERPL-MCNC: 0.3 MG/DL (ref 0.1–1.5)
BILIRUB UR QL STRIP.AUTO: NEGATIVE
BUN SERPL-MCNC: 15 MG/DL (ref 8–22)
CALCIUM SERPL-MCNC: 9.1 MG/DL (ref 8.5–10.5)
CHLORIDE SERPL-SCNC: 105 MMOL/L (ref 96–112)
CO2 SERPL-SCNC: 26 MMOL/L (ref 20–33)
COLOR UR: YELLOW
CREAT SERPL-MCNC: 0.7 MG/DL (ref 0.5–1.4)
EKG IMPRESSION: NORMAL
EOSINOPHIL # BLD AUTO: 0.31 K/UL (ref 0–0.51)
EOSINOPHIL NFR BLD: 4.9 % (ref 0–6.9)
EPI CELLS #/AREA URNS HPF: ABNORMAL /HPF
ERYTHROCYTE [DISTWIDTH] IN BLOOD BY AUTOMATED COUNT: 45.4 FL (ref 35.9–50)
GLOBULIN SER CALC-MCNC: 2.8 G/DL (ref 1.9–3.5)
GLUCOSE BLD-MCNC: 112 MG/DL (ref 65–99)
GLUCOSE CSF-MCNC: 53 MG/DL (ref 40–80)
GLUCOSE SERPL-MCNC: 85 MG/DL (ref 65–99)
GLUCOSE UR STRIP.AUTO-MCNC: NEGATIVE MG/DL
HCG SERPL QL: NEGATIVE
HCT VFR BLD AUTO: 46.7 % (ref 37–47)
HGB BLD-MCNC: 15.1 G/DL (ref 12–16)
HYALINE CASTS #/AREA URNS LPF: ABNORMAL /LPF
IMM GRANULOCYTES # BLD AUTO: 0.01 K/UL (ref 0–0.11)
IMM GRANULOCYTES NFR BLD AUTO: 0.2 % (ref 0–0.9)
KETONES UR STRIP.AUTO-MCNC: NEGATIVE MG/DL
LEUKOCYTE ESTERASE UR QL STRIP.AUTO: ABNORMAL
LIPASE SERPL-CCNC: 33 U/L (ref 11–82)
LYMPHOCYTES # BLD AUTO: 2.82 K/UL (ref 1–4.8)
LYMPHOCYTES NFR BLD: 44.5 % (ref 22–41)
MAGNESIUM SERPL-MCNC: 1.9 MG/DL (ref 1.5–2.5)
MCH RBC QN AUTO: 32.1 PG (ref 27–33)
MCHC RBC AUTO-ENTMCNC: 32.3 G/DL (ref 33.6–35)
MCV RBC AUTO: 99.2 FL (ref 81.4–97.8)
MICRO URNS: ABNORMAL
MONOCYTES # BLD AUTO: 0.46 K/UL (ref 0–0.85)
MONOCYTES NFR BLD AUTO: 7.3 % (ref 0–13.4)
NEUTROPHILS # BLD AUTO: 2.69 K/UL (ref 2–7.15)
NEUTROPHILS NFR BLD: 42.5 % (ref 44–72)
NITRITE UR QL STRIP.AUTO: POSITIVE
NRBC # BLD AUTO: 0 K/UL
NRBC BLD-RTO: 0 /100 WBC
PH UR STRIP.AUTO: 6 [PH] (ref 5–8)
PLATELET # BLD AUTO: 269 K/UL (ref 164–446)
PMV BLD AUTO: 10.5 FL (ref 9–12.9)
POTASSIUM SERPL-SCNC: 4.4 MMOL/L (ref 3.6–5.5)
PROT CSF-MCNC: 22 MG/DL (ref 15–45)
PROT SERPL-MCNC: 6.7 G/DL (ref 6–8.2)
PROT UR QL STRIP: NEGATIVE MG/DL
RBC # BLD AUTO: 4.71 M/UL (ref 4.2–5.4)
RBC # URNS HPF: ABNORMAL /HPF
RBC UR QL AUTO: NEGATIVE
SODIUM SERPL-SCNC: 139 MMOL/L (ref 135–145)
SP GR UR STRIP.AUTO: 1.02
TROPONIN T SERPL-MCNC: <6 NG/L (ref 6–19)
UROBILINOGEN UR STRIP.AUTO-MCNC: 0.2 MG/DL
WBC # BLD AUTO: 6.3 K/UL (ref 4.8–10.8)
WBC #/AREA URNS HPF: ABNORMAL /HPF

## 2021-04-13 PROCEDURE — 96372 THER/PROPH/DIAG INJ SC/IM: CPT

## 2021-04-13 PROCEDURE — 62270 DX LMBR SPI PNXR: CPT

## 2021-04-13 PROCEDURE — 82962 GLUCOSE BLOOD TEST: CPT

## 2021-04-13 PROCEDURE — G0378 HOSPITAL OBSERVATION PER HR: HCPCS

## 2021-04-13 PROCEDURE — 87205 SMEAR GRAM STAIN: CPT

## 2021-04-13 PROCEDURE — 70450 CT HEAD/BRAIN W/O DYE: CPT

## 2021-04-13 PROCEDURE — 700102 HCHG RX REV CODE 250 W/ 637 OVERRIDE(OP): Performed by: INTERNAL MEDICINE

## 2021-04-13 PROCEDURE — 93005 ELECTROCARDIOGRAM TRACING: CPT | Performed by: EMERGENCY MEDICINE

## 2021-04-13 PROCEDURE — U0005 INFEC AGEN DETEC AMPLI PROBE: HCPCS

## 2021-04-13 PROCEDURE — 81001 URINALYSIS AUTO W/SCOPE: CPT

## 2021-04-13 PROCEDURE — 84703 CHORIONIC GONADOTROPIN ASSAY: CPT

## 2021-04-13 PROCEDURE — 700111 HCHG RX REV CODE 636 W/ 250 OVERRIDE (IP): Performed by: EMERGENCY MEDICINE

## 2021-04-13 PROCEDURE — 85025 COMPLETE CBC W/AUTO DIFF WBC: CPT

## 2021-04-13 PROCEDURE — A9270 NON-COVERED ITEM OR SERVICE: HCPCS | Performed by: INTERNAL MEDICINE

## 2021-04-13 PROCEDURE — 700111 HCHG RX REV CODE 636 W/ 250 OVERRIDE (IP): Performed by: INTERNAL MEDICINE

## 2021-04-13 PROCEDURE — 83690 ASSAY OF LIPASE: CPT

## 2021-04-13 PROCEDURE — 96375 TX/PRO/DX INJ NEW DRUG ADDON: CPT

## 2021-04-13 PROCEDURE — 84484 ASSAY OF TROPONIN QUANT: CPT

## 2021-04-13 PROCEDURE — 89051 BODY FLUID CELL COUNT: CPT

## 2021-04-13 PROCEDURE — 83735 ASSAY OF MAGNESIUM: CPT

## 2021-04-13 PROCEDURE — 82945 GLUCOSE OTHER FLUID: CPT

## 2021-04-13 PROCEDURE — U0003 INFECTIOUS AGENT DETECTION BY NUCLEIC ACID (DNA OR RNA); SEVERE ACUTE RESPIRATORY SYNDROME CORONAVIRUS 2 (SARS-COV-2) (CORONAVIRUS DISEASE [COVID-19]), AMPLIFIED PROBE TECHNIQUE, MAKING USE OF HIGH THROUGHPUT TECHNOLOGIES AS DESCRIBED BY CMS-2020-01-R: HCPCS

## 2021-04-13 PROCEDURE — 87070 CULTURE OTHR SPECIMN AEROBIC: CPT

## 2021-04-13 PROCEDURE — 80053 COMPREHEN METABOLIC PANEL: CPT

## 2021-04-13 PROCEDURE — 99285 EMERGENCY DEPT VISIT HI MDM: CPT

## 2021-04-13 PROCEDURE — 71045 X-RAY EXAM CHEST 1 VIEW: CPT

## 2021-04-13 PROCEDURE — 700101 HCHG RX REV CODE 250: Performed by: EMERGENCY MEDICINE

## 2021-04-13 PROCEDURE — 84157 ASSAY OF PROTEIN OTHER: CPT

## 2021-04-13 PROCEDURE — 96374 THER/PROPH/DIAG INJ IV PUSH: CPT | Mod: XU

## 2021-04-13 PROCEDURE — 99219 PR INITIAL OBSERVATION CARE,LEVL II: CPT | Performed by: INTERNAL MEDICINE

## 2021-04-13 RX ORDER — ACETAMINOPHEN 500 MG
1000 TABLET ORAL 2 TIMES DAILY PRN
Status: SHIPPED | COMMUNITY
End: 2023-11-26

## 2021-04-13 RX ORDER — ONDANSETRON 2 MG/ML
4 INJECTION INTRAMUSCULAR; INTRAVENOUS EVERY 4 HOURS PRN
Status: DISCONTINUED | OUTPATIENT
Start: 2021-04-13 | End: 2021-04-14 | Stop reason: HOSPADM

## 2021-04-13 RX ORDER — PROMETHAZINE HYDROCHLORIDE 12.5 MG/1
12.5-25 SUPPOSITORY RECTAL EVERY 4 HOURS PRN
Status: DISCONTINUED | OUTPATIENT
Start: 2021-04-13 | End: 2021-04-14 | Stop reason: HOSPADM

## 2021-04-13 RX ORDER — PROCHLORPERAZINE EDISYLATE 5 MG/ML
5-10 INJECTION INTRAMUSCULAR; INTRAVENOUS EVERY 4 HOURS PRN
Status: DISCONTINUED | OUTPATIENT
Start: 2021-04-13 | End: 2021-04-14 | Stop reason: HOSPADM

## 2021-04-13 RX ORDER — ALBUTEROL SULFATE 90 UG/1
1 AEROSOL, METERED RESPIRATORY (INHALATION) EVERY 4 HOURS PRN
Status: DISCONTINUED | OUTPATIENT
Start: 2021-04-13 | End: 2021-04-14 | Stop reason: HOSPADM

## 2021-04-13 RX ORDER — LORAZEPAM 2 MG/ML
2 INJECTION INTRAMUSCULAR ONCE
Status: COMPLETED | OUTPATIENT
Start: 2021-04-13 | End: 2021-04-13

## 2021-04-13 RX ORDER — PROMETHAZINE HYDROCHLORIDE 25 MG/1
12.5-25 TABLET ORAL EVERY 4 HOURS PRN
Status: DISCONTINUED | OUTPATIENT
Start: 2021-04-13 | End: 2021-04-14 | Stop reason: HOSPADM

## 2021-04-13 RX ORDER — AMOXICILLIN 250 MG
2 CAPSULE ORAL 2 TIMES DAILY
Status: DISCONTINUED | OUTPATIENT
Start: 2021-04-13 | End: 2021-04-14 | Stop reason: HOSPADM

## 2021-04-13 RX ORDER — ATORVASTATIN CALCIUM 40 MG/1
40 TABLET, FILM COATED ORAL EVERY EVENING
Status: DISCONTINUED | OUTPATIENT
Start: 2021-04-13 | End: 2021-04-14 | Stop reason: HOSPADM

## 2021-04-13 RX ORDER — TRAZODONE HYDROCHLORIDE 50 MG/1
150 TABLET ORAL
Status: DISCONTINUED | OUTPATIENT
Start: 2021-04-13 | End: 2021-04-14 | Stop reason: HOSPADM

## 2021-04-13 RX ORDER — METOCLOPRAMIDE HYDROCHLORIDE 5 MG/ML
10 INJECTION INTRAMUSCULAR; INTRAVENOUS ONCE
Status: COMPLETED | OUTPATIENT
Start: 2021-04-13 | End: 2021-04-13

## 2021-04-13 RX ORDER — LIDOCAINE HYDROCHLORIDE 10 MG/ML
20 INJECTION, SOLUTION INFILTRATION; PERINEURAL ONCE
Status: COMPLETED | OUTPATIENT
Start: 2021-04-13 | End: 2021-04-13

## 2021-04-13 RX ORDER — DIPHENHYDRAMINE HYDROCHLORIDE 50 MG/ML
50 INJECTION INTRAMUSCULAR; INTRAVENOUS ONCE
Status: COMPLETED | OUTPATIENT
Start: 2021-04-13 | End: 2021-04-13

## 2021-04-13 RX ORDER — POLYETHYLENE GLYCOL 3350 17 G/17G
1 POWDER, FOR SOLUTION ORAL
Status: DISCONTINUED | OUTPATIENT
Start: 2021-04-13 | End: 2021-04-14 | Stop reason: HOSPADM

## 2021-04-13 RX ORDER — PRAZOSIN HYDROCHLORIDE 5 MG/1
10 CAPSULE ORAL EVERY EVENING
Status: DISCONTINUED | OUTPATIENT
Start: 2021-04-13 | End: 2021-04-14 | Stop reason: HOSPADM

## 2021-04-13 RX ORDER — ACETAMINOPHEN 325 MG/1
650 TABLET ORAL EVERY 6 HOURS PRN
Status: DISCONTINUED | OUTPATIENT
Start: 2021-04-13 | End: 2021-04-14 | Stop reason: HOSPADM

## 2021-04-13 RX ORDER — CITALOPRAM 40 MG/1
40 TABLET ORAL DAILY
Status: DISCONTINUED | OUTPATIENT
Start: 2021-04-14 | End: 2021-04-14 | Stop reason: HOSPADM

## 2021-04-13 RX ORDER — CLONAZEPAM 0.5 MG/1
1 TABLET ORAL DAILY
Status: DISCONTINUED | OUTPATIENT
Start: 2021-04-14 | End: 2021-04-14 | Stop reason: HOSPADM

## 2021-04-13 RX ORDER — BISACODYL 10 MG
10 SUPPOSITORY, RECTAL RECTAL
Status: DISCONTINUED | OUTPATIENT
Start: 2021-04-13 | End: 2021-04-14 | Stop reason: HOSPADM

## 2021-04-13 RX ORDER — HEPARIN SODIUM 5000 [USP'U]/ML
5000 INJECTION, SOLUTION INTRAVENOUS; SUBCUTANEOUS EVERY 8 HOURS
Status: DISCONTINUED | OUTPATIENT
Start: 2021-04-13 | End: 2021-04-14 | Stop reason: HOSPADM

## 2021-04-13 RX ORDER — ONDANSETRON 4 MG/1
4 TABLET, ORALLY DISINTEGRATING ORAL EVERY 4 HOURS PRN
Status: DISCONTINUED | OUTPATIENT
Start: 2021-04-13 | End: 2021-04-14 | Stop reason: HOSPADM

## 2021-04-13 RX ADMIN — LIDOCAINE HYDROCHLORIDE 20 ML: 10 INJECTION, SOLUTION INFILTRATION; PERINEURAL at 21:00

## 2021-04-13 RX ADMIN — LORAZEPAM 2 MG: 2 INJECTION INTRAMUSCULAR; INTRAVENOUS at 23:19

## 2021-04-13 RX ADMIN — ATORVASTATIN CALCIUM 40 MG: 40 TABLET, FILM COATED ORAL at 23:04

## 2021-04-13 RX ADMIN — METOCLOPRAMIDE 10 MG: 5 INJECTION, SOLUTION INTRAMUSCULAR; INTRAVENOUS at 18:23

## 2021-04-13 RX ADMIN — HEPARIN SODIUM 5000 UNITS: 5000 INJECTION, SOLUTION INTRAVENOUS; SUBCUTANEOUS at 23:04

## 2021-04-13 RX ADMIN — DIPHENHYDRAMINE HYDROCHLORIDE 50 MG: 50 INJECTION INTRAMUSCULAR; INTRAVENOUS at 18:23

## 2021-04-13 ASSESSMENT — ENCOUNTER SYMPTOMS
GASTROINTESTINAL NEGATIVE: 1
DIZZINESS: 1
BLURRED VISION: 1
RESPIRATORY NEGATIVE: 1
HEADACHES: 1
EYE PAIN: 0
SPEECH CHANGE: 0
NERVOUS/ANXIOUS: 1
PHOTOPHOBIA: 0
SENSORY CHANGE: 0
EYE DISCHARGE: 0
MUSCULOSKELETAL NEGATIVE: 1
CARDIOVASCULAR NEGATIVE: 1
CONSTITUTIONAL NEGATIVE: 1
WEAKNESS: 1

## 2021-04-13 ASSESSMENT — PAIN DESCRIPTION - PAIN TYPE: TYPE: ACUTE PAIN

## 2021-04-13 ASSESSMENT — PAIN SCALES - WONG BAKER: WONGBAKER_NUMERICALRESPONSE: DOESN'T HURT AT ALL

## 2021-04-13 ASSESSMENT — FIBROSIS 4 INDEX
FIB4 SCORE: 0.54
FIB4 SCORE: 0.9

## 2021-04-13 ASSESSMENT — PATIENT HEALTH QUESTIONNAIRE - PHQ9
1. LITTLE INTEREST OR PLEASURE IN DOING THINGS: NOT AT ALL
2. FEELING DOWN, DEPRESSED, IRRITABLE, OR HOPELESS: NOT AT ALL
SUM OF ALL RESPONSES TO PHQ9 QUESTIONS 1 AND 2: 0

## 2021-04-13 NOTE — ED TRIAGE NOTES
"Chief Complaint   Patient presents with   • Headache     posterior HA   • Nausea   • Weakness     generalized        Pt ambulatory to triage with steady gait for above complaint. Pt reports getting the covid vaccine 4/8 and has since continued to have symptoms, worsening over the past two days. Pt was seen in ED 4/8 for same symptoms. Pt reports slurred speech however none noted, neuro exam WDL.  in triage.    Pt is alert and oriented, speaking in full sentences, follows commands and responds appropriately to questions. Resp are even and unlabored.     Pt placed in lobby. Pt educated on triage process and encouraged to alert staff for any changes.      /70   Pulse 68   Temp 36.8 °C (98.3 °F) (Temporal)   Ht 1.676 m (5' 6\")   Wt 115 kg (252 lb 13.9 oz)   SpO2 93%     "

## 2021-04-14 VITALS
HEART RATE: 83 BPM | SYSTOLIC BLOOD PRESSURE: 109 MMHG | HEIGHT: 66 IN | OXYGEN SATURATION: 92 % | DIASTOLIC BLOOD PRESSURE: 73 MMHG | TEMPERATURE: 98.1 F | BODY MASS INDEX: 40.46 KG/M2 | WEIGHT: 251.77 LBS | RESPIRATION RATE: 16 BRPM

## 2021-04-14 PROBLEM — R51.9 NEW ONSET HEADACHE: Status: RESOLVED | Noted: 2021-04-13 | Resolved: 2021-04-14

## 2021-04-14 LAB
BASOPHILS # BLD AUTO: 0.9 % (ref 0–1.8)
BASOPHILS # BLD: 0.06 K/UL (ref 0–0.12)
CHOLEST SERPL-MCNC: 156 MG/DL (ref 100–199)
EOSINOPHIL # BLD AUTO: 0.23 K/UL (ref 0–0.51)
EOSINOPHIL NFR BLD: 3.4 % (ref 0–6.9)
ERYTHROCYTE [DISTWIDTH] IN BLOOD BY AUTOMATED COUNT: 44.4 FL (ref 35.9–50)
GRAM STN SPEC: NORMAL
HCT VFR BLD AUTO: 45.8 % (ref 37–47)
HDLC SERPL-MCNC: 41 MG/DL
HGB BLD-MCNC: 15.3 G/DL (ref 12–16)
IMM GRANULOCYTES # BLD AUTO: 0.01 K/UL (ref 0–0.11)
IMM GRANULOCYTES NFR BLD AUTO: 0.1 % (ref 0–0.9)
LDLC SERPL CALC-MCNC: 92 MG/DL
LYMPHOCYTES # BLD AUTO: 2.43 K/UL (ref 1–4.8)
LYMPHOCYTES NFR BLD: 35.8 % (ref 22–41)
MCH RBC QN AUTO: 32.8 PG (ref 27–33)
MCHC RBC AUTO-ENTMCNC: 33.4 G/DL (ref 33.6–35)
MCV RBC AUTO: 98.1 FL (ref 81.4–97.8)
MONOCYTES # BLD AUTO: 0.53 K/UL (ref 0–0.85)
MONOCYTES NFR BLD AUTO: 7.8 % (ref 0–13.4)
NEUTROPHILS # BLD AUTO: 3.52 K/UL (ref 2–7.15)
NEUTROPHILS NFR BLD: 52 % (ref 44–72)
NRBC # BLD AUTO: 0 K/UL
NRBC BLD-RTO: 0 /100 WBC
PLATELET # BLD AUTO: 247 K/UL (ref 164–446)
PMV BLD AUTO: 10.2 FL (ref 9–12.9)
RBC # BLD AUTO: 4.67 M/UL (ref 4.2–5.4)
SARS-COV-2 RNA RESP QL NAA+PROBE: NOTDETECTED
SIGNIFICANT IND 70042: NORMAL
SITE SITE: NORMAL
SOURCE SOURCE: NORMAL
SPECIMEN SOURCE: NORMAL
TRIGL SERPL-MCNC: 113 MG/DL (ref 0–149)
WBC # BLD AUTO: 6.8 K/UL (ref 4.8–10.8)

## 2021-04-14 PROCEDURE — 700111 HCHG RX REV CODE 636 W/ 250 OVERRIDE (IP): Performed by: INTERNAL MEDICINE

## 2021-04-14 PROCEDURE — 80061 LIPID PANEL: CPT

## 2021-04-14 PROCEDURE — A9270 NON-COVERED ITEM OR SERVICE: HCPCS | Performed by: INTERNAL MEDICINE

## 2021-04-14 PROCEDURE — 70551 MRI BRAIN STEM W/O DYE: CPT

## 2021-04-14 PROCEDURE — 70544 MR ANGIOGRAPHY HEAD W/O DYE: CPT

## 2021-04-14 PROCEDURE — G0378 HOSPITAL OBSERVATION PER HR: HCPCS

## 2021-04-14 PROCEDURE — 96372 THER/PROPH/DIAG INJ SC/IM: CPT

## 2021-04-14 PROCEDURE — 700102 HCHG RX REV CODE 250 W/ 637 OVERRIDE(OP): Performed by: INTERNAL MEDICINE

## 2021-04-14 PROCEDURE — 99217 PR OBSERVATION CARE DISCHARGE: CPT | Performed by: HOSPITALIST

## 2021-04-14 PROCEDURE — 85025 COMPLETE CBC W/AUTO DIFF WBC: CPT

## 2021-04-14 RX ORDER — SULFAMETHOXAZOLE AND TRIMETHOPRIM 800; 160 MG/1; MG/1
1 TABLET ORAL DAILY
Qty: 3 TABLET | Refills: 0 | Status: SHIPPED | OUTPATIENT
Start: 2021-04-14 | End: 2021-04-17

## 2021-04-14 RX ADMIN — PRAZOSIN HYDROCHLORIDE 10 MG: 5 CAPSULE ORAL at 00:30

## 2021-04-14 RX ADMIN — HEPARIN SODIUM 5000 UNITS: 5000 INJECTION, SOLUTION INTRAVENOUS; SUBCUTANEOUS at 06:17

## 2021-04-14 RX ADMIN — CITALOPRAM 40 MG: 40 TABLET, FILM COATED ORAL at 06:17

## 2021-04-14 RX ADMIN — CLONAZEPAM 1 MG: 0.5 TABLET ORAL at 06:17

## 2021-04-14 ASSESSMENT — LIFESTYLE VARIABLES
DOES PATIENT WANT TO STOP DRINKING: NO
TOTAL SCORE: 0
EVER FELT BAD OR GUILTY ABOUT YOUR DRINKING: NO
ALCOHOL_USE: NO
TOTAL SCORE: 0
HAVE PEOPLE ANNOYED YOU BY CRITICIZING YOUR DRINKING: NO
EVER HAD A DRINK FIRST THING IN THE MORNING TO STEADY YOUR NERVES TO GET RID OF A HANGOVER: NO
TOTAL SCORE: 0
CONSUMPTION TOTAL: NEGATIVE
ON A TYPICAL DAY WHEN YOU DRINK ALCOHOL HOW MANY DRINKS DO YOU HAVE: 0
HAVE YOU EVER FELT YOU SHOULD CUT DOWN ON YOUR DRINKING: NO
AVERAGE NUMBER OF DAYS PER WEEK YOU HAVE A DRINK CONTAINING ALCOHOL: 0
HOW MANY TIMES IN THE PAST YEAR HAVE YOU HAD 5 OR MORE DRINKS IN A DAY: 0

## 2021-04-14 ASSESSMENT — PATIENT HEALTH QUESTIONNAIRE - PHQ9
2. FEELING DOWN, DEPRESSED, IRRITABLE, OR HOPELESS: NOT AT ALL
SUM OF ALL RESPONSES TO PHQ9 QUESTIONS 1 AND 2: 0
1. LITTLE INTEREST OR PLEASURE IN DOING THINGS: NOT AT ALL

## 2021-04-14 ASSESSMENT — PAIN SCALES - WONG BAKER
WONGBAKER_NUMERICALRESPONSE: DOESN'T HURT AT ALL
WONGBAKER_NUMERICALRESPONSE: DOESN'T HURT AT ALL

## 2021-04-14 ASSESSMENT — PAIN DESCRIPTION - PAIN TYPE: TYPE: ACUTE PAIN

## 2021-04-14 NOTE — ED NOTES
1830  Plan of care explained to pt. piv established labs sent. Medicated for head ache rates as 8.5/10 per mar order. Allergies verified.

## 2021-04-14 NOTE — DISCHARGE INSTRUCTIONS
Discharge Instructions    Discharged to home by car with relative. Discharged via wheelchair, hospital escort: Yes.  Special equipment needed: Not Applicable    Be sure to schedule a follow-up appointment with your primary care doctor or any specialists as instructed.     Discharge Plan:   Diet Plan: Discussed  Activity Level: Discussed  Confirmed Follow up Appointment: Appointment Scheduled  Confirmed Symptoms Management: Discussed  Medication Reconciliation Updated: Yes    I understand that a diet low in cholesterol, fat, and sodium is recommended for good health. Unless I have been given specific instructions below for another diet, I accept this instruction as my diet prescription.   Other diet: heart healthy     Special Instructions: None    · Is patient discharged on Warfarin / Coumadin?   No     Depression / Suicide Risk    As you are discharged from this [facility] facility, it is important to learn how to keep safe from harming yourself.    Recognize the warning signs:  · Abrupt changes in personality, positive or negative- including increase in energy   · Giving away possessions  · Change in eating patterns- significant weight changes-  positive or negative  · Change in sleeping patterns- unable to sleep or sleeping all the time   · Unwillingness or inability to communicate  · Depression  · Unusual sadness, discouragement and loneliness  · Talk of wanting to die  · Neglect of personal appearance   · Rebelliousness- reckless behavior  · Withdrawal from people/activities they love  · Confusion- inability to concentrate     If you or a loved one observes any of these behaviors or has concerns about self-harm, here's what you can do:  · Talk about it- your feelings and reasons for harming yourself  · Remove any means that you might use to hurt yourself (examples: pills, rope, extension cords, firearm)  · Get professional help from the community (Mental Health, Substance Abuse, psychological  counseling)  · Do not be alone:Call your Safe Contact- someone whom you trust who will be there for you.  · Call your local CRISIS HOTLINE 424-4213 or 391-018-1517  · Call your local Children's Mobile Crisis Response Team [place] (168) 143-0785 or www.[employer]  · Call the toll free National Suicide Prevention Hotlines   · National Suicide Prevention Lifeline 485-233-YLSQ (2322)  · National TVSmiles Line Network 800-SUICIDE (427-9810)    FOLLOW UP ITEMS POST DISCHARGE  Please call [phone] to schedule PCP appointment for patient.    Required specialty appointments include:       Discharge Instructions per Cecilio Young, A.P.R.N.  -Follow-up with PCP  -Start taking Bactrim daily for 3 days  -Keep hydrated, drink at least 1 mL of water daily    DIET: As tolerated    ACTIVITY: As tolerated    DIAGNOSIS: Headache    Return to ER if symptoms persist, chest pain, palpitations, shortness of breath, numbness, tingling, weakness, and high fevers.

## 2021-04-14 NOTE — PROGRESS NOTES
Brief Neuro Note:    Case discuss with ERP.   New onset HA.  Had JJ COVID vaccine recently.   Cell count on CSF looks unremarkable. MRI brain on DWI and [facility] on my review looks unremarkable.  MRV I did not see anything major.   Reads are pending.   Please call back if official reads show anything concerning.       John Jovel M.D.

## 2021-04-14 NOTE — PROGRESS NOTES
Received from yellow  pod, aox4, steady on her  feet. Denies pain or sob. Call light within reach. Needs attended. Plan of care discussed and understood.

## 2021-04-14 NOTE — ASSESSMENT & PLAN NOTE
-Recently had J&J vaccine  -Will need to obtain further imaging to rule out blood clot  -Follow up LP results  -Pain control PRN  -Ativan 2mg IV ordered strictly to be given prior to MRI due to her anxiety

## 2021-04-14 NOTE — ED NOTES
Med Rec completed: per patient at bedside  Preferred Pharmacy: [facility] [address]  Allergies:  Allergies   Allergen Reactions   • Penicillins Hives     Happened as a child   • Vicodin [Hydrocodone-Acetaminophen] Hives     'WIRES ME, DO NOT LIKE HOW IT MAKES ME FEEL',HYPERACTIVITY    Reports reaction of hives on 04.13.21 - Reports she can take tylenol       No ORAL antibiotics in last 14 days    Home Medications:  Medication Sig Comments   • acetaminophen (TYLENOL) 500 MG Tab Take 1,000 mg by mouth 2 times a day as needed (Pain). Has been taking twice daily every day since last thurs.   • clonazePAM (KLONOPIN) 1 MG Tab Take 1 mg by mouth 3 times a day. Takes TID everyday.   • atorvastatin (LIPITOR) 40 MG Tab TAKE 1 TABLET BY MOUTH ONCE DAILY IN THE EVENING     • traZODone (DESYREL) 150 MG Tab Take 150 mg by mouth at bedtime.    • albuterol (VENTOLIN HFA) 108 (90 Base) MCG/ACT Aero Soln inhalation aerosol INHALE TWO PUFFS BY MOUTH EVERY 6 HOURS AS NEEDED FOR SHORTNESS OF BREATH     • citalopram (CELEXA) 40 MG Tab Take 40 mg by mouth every morning.    • prazosin (MINIPRESS) 5 MG Cap Take 10 mg by mouth every evening.   2 caps = 10 mg

## 2021-04-14 NOTE — HOSPITAL COURSE
Is a 40-year-old female with a past medical history significant for anxiety, asthma, COPD, depression, GERD, presents to the ER with a complaint of headache.  Status post COVID-19 J&J vaccine last week and since then started having headache.  Neurology was consulted, CT scan of head unremarkable.  MRI brain showed partial empty sella otherwise unremarkable MRI of brain.  MRA of the head unremarkable, MRV normal.  Lumbar puncture was obtained noted to be unremarkable cell count, CSF glucose 53 CSF protein 22.    Upon my evaluation, he is noted to be walking in the hallway without any problem.  At this time patient medically stable to be discharged home.    She will follow-up with primary care physician as an outpatient.

## 2021-04-14 NOTE — ED NOTES
Ambulated to room w/steady gait. Aaox4. gcs of 15. Neuro intact. Perrla. C/o head ache, nausea and feels like having difficulty concentrating. Call light within reach. Instructed to call staff for any assistance

## 2021-04-14 NOTE — H&P
Hospital Medicine History & Physical Note    Date of Service  4/13/2021    Primary Care Physician  Ester Srinivasan M.D.    Consultants  Neuro    Code Status  Full Code    Chief Complaint  Chief Complaint   Patient presents with   • Headache     posterior HA   • Nausea   • Weakness     generalized        History of Presenting Illness  40 y.o. female who presented 4/13/2021 with headache that she states that is all over her head. She states that it is not specifically aggravated or alleviated by anything. She states that her headache comes and goes and that it is associated with blurry vision. She had J&J vaccine last week for COVID and states that 15 minutes later, her eyes rolled back and her program staff personnel thought that she was having a stroke. She was seen in ED and was discharged. Today in her program, they thought she was having slurred speech and sent to ED. Case was discussed with neurology and it was recommended that due to the vaccine causing cases of blood clots, it would be warranted that patient have additional imaging study done and be observed.    Patient is currently in program for her alcohol problem. Last drink reported by patient was almost 4 months ago.     Review of Systems  Review of Systems   Constitutional: Negative.    HENT: Negative.    Eyes: Positive for blurred vision. Negative for photophobia, pain and discharge.   Respiratory: Negative.    Cardiovascular: Negative.    Gastrointestinal: Negative.    Genitourinary: Negative.    Musculoskeletal: Negative.    Skin: Negative.    Neurological: Positive for dizziness, weakness and headaches. Negative for sensory change and speech change.   Psychiatric/Behavioral: The patient is nervous/anxious.        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    Surgical History   has a past surgical history that includes tonsillectomy; septoplasty (4/24/2015); turbinoplasty (4/24/2015); and esophagoscopy (4/24/2015).     Family History  family history includes Heart Disease in her maternal grandfather; Hyperlipidemia in her mother; Lung Disease in her mother; Stroke in her mother; Thyroid in her maternal uncle and mother.     Social History   reports that she has been smoking cigarettes. She has a 25.00 pack-year smoking history. She has never used smokeless tobacco. She reports previous drug use. Drugs: Marijuana and Inhaled. She reports that she does not drink alcohol.    Allergies  Allergies   Allergen Reactions   • Penicillin G    • Penicillins Hives   • Vicodin [Hydrocodone-Acetaminophen]      'WIRES ME, DO NOT LIKE HOW IT MAKES ME FEEL',HYPERACTIVITY       Medications  Prior to Admission Medications   Prescriptions Last Dose Informant Patient Reported? Taking?   albuterol (PROVENTIL) 2.5mg/3ml Nebu Soln solution for nebulization   No No   Sig: 3 mL by Nebulization route every four hours as needed. AS NEEDED FOR SHORTNESS OF BREATH   albuterol (VENTOLIN HFA) 108 (90 Base) MCG/ACT Aero Soln inhalation aerosol   No No   Sig: INHALE TWO PUFFS BY MOUTH EVERY 6 HOURS AS NEEDED FOR SHORTNESS OF BREATH   atorvastatin (LIPITOR) 40 MG Tab   No No   Sig: TAKE 1 TABLET BY MOUTH ONCE DAILY IN THE EVENING   citalopram (CELEXA) 40 MG Tab   Yes No   clonazePAM (KLONOPIN) 1 MG Tab   Yes No   prazosin (MINIPRESS) 5 MG Cap   Yes No   Sig: Take 10 mg by mouth every evening.   traZODone (DESYREL) 150 MG Tab   Yes No      Facility-Administered Medications: None       Physical Exam  Temp:  [36.8 °C (98.3 °F)] 36.8 °C (98.3 °F)  Pulse:  [59-68] 60  Resp:  [14-20] 16  BP: ()/(54-70) 107/65  SpO2:  [90 %-94 %] 91 %    Physical Exam  Vitals and nursing note reviewed.    Constitutional:       General: She is not in acute distress.     Appearance: Normal appearance. She is obese. She is not ill-appearing.   HENT:      Head: Normocephalic and atraumatic.      Mouth/Throat:      Mouth: Mucous membranes are moist.      Pharynx: Oropharynx is clear.   Eyes:      General: No scleral icterus.     Extraocular Movements: Extraocular movements intact.      Conjunctiva/sclera: Conjunctivae normal.      Pupils: Pupils are equal, round, and reactive to light.   Cardiovascular:      Rate and Rhythm: Normal rate and regular rhythm.      Pulses: Normal pulses.      Heart sounds: Normal heart sounds.   Pulmonary:      Effort: Pulmonary effort is normal.      Breath sounds: Normal breath sounds. No rhonchi.   Abdominal:      General: Bowel sounds are normal. There is no distension.      Palpations: Abdomen is soft.      Tenderness: There is no abdominal tenderness. There is no guarding.   Musculoskeletal:         General: No swelling. Normal range of motion.      Cervical back: Normal range of motion and neck supple. No rigidity.   Skin:     General: Skin is warm and dry.      Coloration: Skin is not jaundiced.   Neurological:      General: No focal deficit present.      Mental Status: She is alert and oriented to person, place, and time. Mental status is at baseline.   Psychiatric:         Mood and Affect: Mood normal.         Behavior: Behavior normal.         Thought Content: Thought content normal.         Judgment: Judgment normal.         Laboratory:  Recent Labs     04/13/21  1824   WBC 6.3   RBC 4.71   HEMOGLOBIN 15.1   HEMATOCRIT 46.7   MCV 99.2*   MCH 32.1   MCHC 32.3*   RDW 45.4   PLATELETCT 269   MPV 10.5     Recent Labs     04/13/21  1824   SODIUM 139   POTASSIUM 4.4   CHLORIDE 105   CO2 26   GLUCOSE 85   BUN 15   CREATININE 0.70   CALCIUM 9.1     Recent Labs     04/13/21  1824   ALTSGPT 25   ASTSGOT 31   ALKPHOSPHAT 63   TBILIRUBIN 0.3   LIPASE 33   GLUCOSE 85         No results for  input(s): NTPROBNP in the last 72 hours.      Recent Labs     04/13/21  1824   TROPONINT <6       Imaging:  CT-HEAD W/O   Final Result      No acute intracranial abnormality is identified.      Mucosal thickening within the right maxillary sinus.      DX-CHEST-LIMITED (1 VIEW)   Final Result      No acute cardiopulmonary process is identified.      MR-BRAIN-W/O    (Results Pending)   MR-MRA HEAD-W/O    (Results Pending)   MR-VENOGRAM (MRV) HEAD    (Results Pending)         Assessment/Plan:  I anticipate this patient is appropriate for observation status at this time.    * New onset headache- (present on admission)  Assessment & Plan  -Recently had J&J vaccine  -Will need to obtain further imaging to rule out blood clot  -Follow up LP results  -Pain control PRN  -Ativan 2mg IV ordered strictly to be given prior to MRI due to her anxiety     Chronic obstructive pulmonary disease (HCC)- (present on admission)  Assessment & Plan  -Continue home nebs    History of substance abuse (HCC)- (present on admission)  Assessment & Plan  -Last drink 4 months ago  -Will continue klonopin 1mg TID    Anxiety- (present on admission)  Assessment & Plan  -Continue home meds

## 2021-04-14 NOTE — DISCHARGE PLANNING
Care Transition Team Assessment    Spoke with patient at bedside and verified all information. Staying @ [facility]. PCP Ester Srinivasan. Uses no DME. Has [place] [employer]. Uses [facility] [address]. Has taxi voucher to return to [place] from [facility]. Anticipate no needs @ present time.      Information Source  Orientation : Oriented x 4  Information Given By: Patient    Readmission Evaluation  Is this a readmission?: No    Interdisciplinary Discharge Planning  Primary Care Physician: Ester Srinivasan  Lives with - Patient's Self Care Capacity: Other (Comments)(Staying at [facility].)  Patient or legal guardian wants to designate a caregiver: No  Support Systems: Parent  Housing / Facility: Other (Comments)  Name of Care Facility: [facility]  Do You Take your Prescribed Medications Regularly: Yes  Able to Return to Previous ADL's: Yes  Mobility Issues: No  Patient Prefers to be Discharged to:: Self  Assistance Needed: No  Durable Medical Equipment: Not Applicable    Discharge Preparedness  What are your discharge supports?: Parent  Prior Functional Level: Ambulatory    Functional Assesment  Prior Functional Level: Ambulatory    Finances  Prescription Coverage: Yes    Anticipated Discharge Information  Discharge Address: [address] [place] [address]  Discharge Contact Phone Number: [phone]

## 2021-04-14 NOTE — CARE PLAN
Problem: Safety  Goal: Will remain free from injury  Outcome: PROGRESSING AS EXPECTED     Problem: Safety  Goal: Will remain free from injury  Outcome: PROGRESSING AS EXPECTED     Problem: Pain Management  Goal: Pain level will decrease to patient's comfort goal  Outcome: PROGRESSING AS EXPECTED     Problem: Infection  Goal: Will remain free from infection  Outcome: PROGRESSING AS EXPECTED

## 2021-04-14 NOTE — PROGRESS NOTES
2 RN skin check complete with Annie. Skin  is intact  Devices in place: N/A  Skin assessed under devices: Yes  Confirmed pressure ulcers found: N/A  New potential pressure ulcers noted: N/A   Wound consult placed: N/A  The following interventions in place: Ambulatory and able to turn  to sides

## 2021-04-14 NOTE — DISCHARGE SUMMARY
Discharge Summary    CHIEF COMPLAINT ON ADMISSION  Chief Complaint   Patient presents with   • Headache     posterior HA   • Nausea   • Weakness     generalized        Reason for Admission  Headache     Admission Date  4/13/2021    CODE STATUS  Full Code    HPI & HOSPITAL COURSE  Ms. Calderon is a 40 y.o. female with a PMHx of anxiety, asthmas, COPD, depression, GERD, HLD, alcohol abuse,  who presented 4/13/2021 with headache that she states that is all over her head. She states that it is not specifically aggravated or alleviated by anything. She states that her headache comes and goes and that it is associated with blurry vision. She had J&J vaccine last week for COVID and states that 15 minutes later, her eyes rolled back and her program staff personnel thought that she was having a stroke. She was seen in ED and was discharged. Today in her program, they thought she was having slurred speech and sent to ED. Case was discussed with neurology and it was recommended that due to the vaccine causing cases of blood clots, it would be warranted that patient have additional imaging study done and be observed. Patient is currently in program for her alcohol problem. Last drink reported by patient was almost 4 months ago.     Patient seen and examined prior to being discharged.  Patient reports marked improvement from admission symptoms.  Discussed with patient MRI results.  Also discussed lumbar puncture/CSF testing results which was unremarkable.  UA results were discussed with patient which noted positive nitrates.  Patient only reports urinary frequency, but denies any pain, or discomfort.  Patient will be given a 3-day supply of Bactrim.  Patient highly encouraged to follow-up with PCP.  All questions and concerns answered prior to being discharged.  Patient discharged home.    Therefore, she is discharged in good and stable condition to home with close outpatient follow-up.    The patient recovered much more quickly  than anticipated on admission.    Discharge Date  04/14/21       FOLLOW UP ITEMS POST DISCHARGE  Please call [phone] to schedule PCP appointment for patient.    Required specialty appointments include:       Discharge Instructions per AMANDA Hargrove  -Follow-up with PCP  -Start taking Bactrim daily for 3 days  -Keep hydrated, drink at least 1 mL of water daily    DIET: As tolerated    ACTIVITY: As tolerated    DIAGNOSIS: Headache    Return to ER if symptoms persist, chest pain, palpitations, shortness of breath, numbness, tingling, weakness, and high fevers.    DISCHARGE DIAGNOSES  Principal Problem (Resolved):    New onset headache POA: Yes  Active Problems:    History of substance abuse (HCC) POA: Yes      Overview: ETOH-quit 2013, meth-quit 2007    Chronic obstructive pulmonary disease (HCC) POA: Yes  Resolved Problems:    Anxiety POA: Yes      FOLLOW UP  Future Appointments   Date Time Provider Department Center   4/21/2021  8:00 AM Ester Srinivasan M.D. [facility]     Ester Srinivasan M.D.  [address] [address]  [phone]    Schedule an appointment as soon as possible for a visit in 1 week        MEDICATIONS ON DISCHARGE     Medication List      START taking these medications      Instructions   sulfamethoxazole-trimethoprim 800-160 MG tablet  Commonly known as: BACTRIM DS   Take 1 tablet by mouth every day for 3 days.  Dose: 1 tablet        CHANGE how you take these medications      Instructions   * albuterol 2.5mg/3ml Nebu solution for nebulization  What changed: Another medication with the same name was changed. Make sure you understand how and when to take each.  Commonly known as: PROVENTIL   3 mL by Nebulization route every four hours as needed. AS NEEDED FOR SHORTNESS OF BREATH  Dose: 2.5 mg     * albuterol 108 (90 Base) MCG/ACT Aers inhalation aerosol  What changed:   · how much to take  · how to take this  · when to take this  · reasons to  take this  · additional instructions  Commonly known as: Ventolin HFA   Doctor's comments: Generic or brand name if covered by insurance  INHALE TWO PUFFS BY MOUTH EVERY 6 HOURS AS NEEDED FOR SHORTNESS OF BREATH     atorvastatin 40 MG Tabs  What changed:   · how much to take  · how to take this  · when to take this  · additional instructions  Commonly known as: LIPITOR   TAKE 1 TABLET BY MOUTH ONCE DAILY IN THE EVENING         * This list has 2 medication(s) that are the same as other medications prescribed for you. Read the directions carefully, and ask your doctor or other care provider to review them with you.            CONTINUE taking these medications      Instructions   acetaminophen 500 MG Tabs  Commonly known as: TYLENOL   Take 1,000 mg by mouth 2 times a day as needed (Pain).  Dose: 1,000 mg     citalopram 40 MG Tabs  Commonly known as: CeleXA   Take 40 mg by mouth every morning.  Dose: 40 mg     clonazePAM 1 MG Tabs  Commonly known as: KLONOPIN   Take 1 mg by mouth 3 times a day.  Dose: 1 mg     prazosin 5 MG Caps  Commonly known as: MINIPRESS   Take 10 mg by mouth every evening. 2 caps = 10 mg  Dose: 10 mg     traZODone 150 MG Tabs  Commonly known as: DESYREL   Take 150 mg by mouth at bedtime.  Dose: 150 mg            Allergies  Allergies   Allergen Reactions   • Penicillins Hives     Happened as a child   • Vicodin [Hydrocodone-Acetaminophen] Hives     'WIRES ME, DO NOT LIKE HOW IT MAKES ME FEEL',HYPERACTIVITY    Reports reaction of hives on 04.13.21 - Reports she can take tylenol       DIET  Orders Placed This Encounter   Procedures   • Diet Order Diet: Regular     Standing Status:   Standing     Number of Occurrences:   1     Order Specific Question:   Diet:     Answer:   Regular [1]       ACTIVITY  As tolerated.  Weight bearing as tolerated    CONSULTATIONS  Neurology    PROCEDURES  NONE    IMAGING    MR-VENOGRAM (MRV) HEAD   Final Result         Normal MR venogram of the brain without contrast.       MR-MRA HEAD-W/O   Final Result         MRA OF THE [place] OF ORDONEZ WITHIN NORMAL LIMITS.      MR-BRAIN-W/O   Final Result         1.  Partially empty sella otherwise unremarkable MRI scan of the brain without contrast.      CT-HEAD W/O   Final Result      No acute intracranial abnormality is identified.      Mucosal thickening within the right maxillary sinus.      DX-CHEST-LIMITED (1 VIEW)   Final Result      No acute cardiopulmonary process is identified.            LABORATORY  Lab Results   Component Value Date    SODIUM 139 04/13/2021    POTASSIUM 4.4 04/13/2021    CHLORIDE 105 04/13/2021    CO2 26 04/13/2021    GLUCOSE 85 04/13/2021    BUN 15 04/13/2021    CREATININE 0.70 04/13/2021    CREATININE 0.9 09/25/2008        Lab Results   Component Value Date    WBC 6.8 04/14/2021    HEMOGLOBIN 15.3 04/14/2021    HEMATOCRIT 45.8 04/14/2021    PLATELETCT 247 04/14/2021        Total time of the discharge process exceeds 36 minutes.  ============================================================================================================  Please note that this dictation was created using voice recognition software. I have made every reasonable attempt to correct obvious errors, but there may be errors of grammar and possibly content that I did not discover before finalizing the note.    Electronically signed by:  MONIQUE Young, MSN, APRN, FNP-C  Hospitalist Services  [facility]  [phone]  [email or web address]  04/14/21    [id number]

## 2021-04-14 NOTE — ED PROVIDER NOTES
ED Provider Note    CHIEF COMPLAINT  Chief Complaint   Patient presents with   • Headache     posterior HA   • Nausea   • Weakness     generalized        HPI  Tracy Calderon is a 40 y.o. female who presents to the emergency department t with multiple vague complaints.  She states that she does not feel well, she has had nausea and vomiting, slight abdominal discomfort in the epigastric region, slight headache.  She does endorse that she had Mark & Mark vaccination on 8 April.  She states that she immediately started feeling off, she had nausea, vomiting, lightheadedness and dizziness.  She was seen in the emergency department and she states that she was okay.  She since that time she has had increasing nausea and vomiting, with overall generalized weakness, fatigue, body aches and headache.  The headache is diffuse in the anterior aspect of her head to the posterior aspect of her head, is gradual in onset, comes and goes.  She states she feels like she has a virus.  Is loss of sensation or strength in arms or legs, denies any eliciting alleviating factors for her discomfort in her head and abdomen.  As for the abdominal pain, is dull in nature, is no radiation, and releasing leaving factors, she is at nausea and vomiting.  In addition she has had a chronic care facility and states that they noted that she might of had slurred speech earlier today during her session.    REVIEW OF SYSTEMS  Positives as above. Pertinent negatives include fever, shakes, chills, sweats, nausea, vomiting  All other 10 review of systems are negative    PAST MEDICAL HISTORY  Past Medical History:   Diagnosis Date   • Anxiety    • Asthma     will bring inhalers   • COPD (chronic obstructive pulmonary disease) ([facility]) 2/17/2015   • Cricopharyngeal achalasia 3/12/2015   • Depression    • ETOH abuse    • GERD (gastroesophageal reflux disease)    • History of substance abuse ([facility]) 2/17/2015    ETOH-quit 2013, meth-quit 2007   •  Hyperlipidemia 2/17/2015   • Hyperlipidemia 2/17/2015       FAMILY HISTORY  Noncontributory    SOCIAL HISTORY  Social History     Socioeconomic History   • Marital status: Single     Spouse name: Not on file   • Number of children: Not on file   • Years of education: Not on file   • Highest education level: Not on file   Occupational History   • Not on file   Tobacco Use   • Smoking status: Current Every Day Smoker     Packs/day: 1.00     Years: 25.00     Pack years: 25.00     Types: Cigarettes   • Smokeless tobacco: Never Used   Substance and Sexual Activity   • Alcohol use: No     Comment: history of alcoholism, quit Dec 2020.  1 pint vodka daily since age 14   • Drug use: Not Currently     Types: Marijuana, Inhaled     Comment: history of meth use, quit 2007 never IV, marijuana  until Dec 2020   • Sexual activity: Not Currently     Partners: Male   Other Topics Concern   • Not on file   Social History Narrative   • Not on file     Social Determinants of Health     Financial Resource Strain:    • Difficulty of Paying Living Expenses:    Food Insecurity:    • Worried About Running Out of Food in the Last Year:    • Ran Out of Food in the Last Year:    Transportation Needs:    • Lack of Transportation (Medical):    • Lack of Transportation (Non-Medical):    Physical Activity:    • Days of Exercise per Week:    • Minutes of Exercise per Session:    Stress:    • Feeling of Stress :    Social Connections:    • Frequency of Communication with Friends and Family:    • Frequency of Social Gatherings with Friends and Family:    • Attends [employer] orth[employer] Services:    • Active Member of Clubs or Organizations:    • Attends Club or Organization Meetings:    • Marital Status:    Intimate Partner Violence:    • Fear of Current or Ex-Partner:    • Emotionally Abused:    • Physically Abused:    • Sexually Abused:        SURGICAL HISTORY  Past Surgical History:   Procedure Laterality Date   • SEPTOPLASTY  4/24/2015    Performed by  "Renard Pierre M.D. at SURGERY SAME DAY [facility]   • TURBINOPLASTY  4/24/2015    Performed by Renard Pierre M.D. at SURGERY SAME DAY [facility]   • ESOPHAGOSCOPY  4/24/2015    Performed by Renard Pierre M.D. at SURGERY SAME DAY [facility]   • TONSILLECTOMY         CURRENT MEDICATIONS  Home Medications    **Home medications have not yet been reviewed for this encounter**         ALLERGIES  Allergies   Allergen Reactions   • Penicillin G    • Penicillins Hives   • Vicodin [Hydrocodone-Acetaminophen]      'WIRES ME, DO NOT LIKE HOW IT MAKES ME FEEL',HYPERACTIVITY       PHYSICAL EXAM  VITAL SIGNS: /65   Pulse 60   Temp 36.8 °C (98.3 °F) (Temporal)   Resp 16   Ht 1.676 m (5' 6\")   Wt 115 kg (252 lb 13.9 oz)   LMP 03/10/2021   SpO2 91%   BMI 40.81 kg/m²    Body mass index is 40.81 kg/m².    Constitutional: Well developed, Well nourished, No acute distress, Non-toxic appearance.   Eyes: PERRLA, EOMI, Conjunctiva normal, No discharge.   Cardiovascular: Normal heart rate, Normal rhythm, No murmurs, No rubs, No gallops, and intact distal pulses.   Thorax & Lungs:  No respiratory distress, no rales, no rhonchi, No wheezing, No chest wall tenderness.   Abdomen: Bowel sounds normal, Soft, No tenderness, No guarding, No rebound, No pulsatile masses.   Skin: Warm, Dry, No erythema, No rash.   Extremities: Full range of motion, no deformity, no edema.  Neurologic:  Alert & oriented to month and age, Normal cognition, Cranial nerves II-XII are intact, No slurred speech, Negative finger to nose bilaterally, No pronator drift bilaterally,   strength 5/5 bilaterally, Leg raise strength 5/5 bilaterally, Plantarflexion strength 5/5 bilaterally, Dorsiflexion strength 5/5 bilaterally, Deep tendon reflexes 2/4 upper and lower extremities bilaterally, Sensation intact throughout, No Nystagmus.  Psychiatric: Affect normal for clinical presentation.      RADIOLOGY/PROCEDURES  CT-HEAD W/O   Final Result      No " acute intracranial abnormality is identified.      Mucosal thickening within the right maxillary sinus.      DX-CHEST-LIMITED (1 VIEW)   Final Result      No acute cardiopulmonary process is identified.      MR-BRAIN-W/O    (Results Pending)   MR-MRA HEAD-W/O    (Results Pending)   MR-VENOGRAM (MRV) HEAD    (Results Pending)       Results for orders placed or performed during the hospital encounter of 04/13/21   CBC WITH DIFFERENTIAL   Result Value Ref Range    WBC 6.3 4.8 - 10.8 K/uL    RBC 4.71 4.20 - 5.40 M/uL    Hemoglobin 15.1 12.0 - 16.0 g/dL    Hematocrit 46.7 37.0 - 47.0 %    MCV 99.2 (H) 81.4 - 97.8 fL    MCH 32.1 27.0 - 33.0 pg    MCHC 32.3 (L) 33.6 - 35.0 g/dL    RDW 45.4 35.9 - 50.0 fL    Platelet Count 269 164 - 446 K/uL    MPV 10.5 9.0 - 12.9 fL    Neutrophils-Polys 42.50 (L) 44.00 - 72.00 %    Lymphocytes 44.50 (H) 22.00 - 41.00 %    Monocytes 7.30 0.00 - 13.40 %    Eosinophils 4.90 0.00 - 6.90 %    Basophils 0.60 0.00 - 1.80 %    Immature Granulocytes 0.20 0.00 - 0.90 %    Nucleated RBC 0.00 /100 WBC    Neutrophils (Absolute) 2.69 2.00 - 7.15 K/uL    Lymphs (Absolute) 2.82 1.00 - 4.80 K/uL    Monos (Absolute) 0.46 0.00 - 0.85 K/uL    Eos (Absolute) 0.31 0.00 - 0.51 K/uL    Baso (Absolute) 0.04 0.00 - 0.12 K/uL    Immature Granulocytes (abs) 0.01 0.00 - 0.11 K/uL    NRBC (Absolute) 0.00 K/uL   COMP METABOLIC PANEL   Result Value Ref Range    Sodium 139 135 - 145 mmol/L    Potassium 4.4 3.6 - 5.5 mmol/L    Chloride 105 96 - 112 mmol/L    Co2 26 20 - 33 mmol/L    Anion Gap 8.0 7.0 - 16.0    Glucose 85 65 - 99 mg/dL    Bun 15 8 - 22 mg/dL    Creatinine 0.70 0.50 - 1.40 mg/dL    Calcium 9.1 8.5 - 10.5 mg/dL    AST(SGOT) 31 12 - 45 U/L    ALT(SGPT) 25 2 - 50 U/L    Alkaline Phosphatase 63 30 - 99 U/L    Total Bilirubin 0.3 0.1 - 1.5 mg/dL    Albumin 3.9 3.2 - 4.9 g/dL    Total Protein 6.7 6.0 - 8.2 g/dL    Globulin 2.8 1.9 - 3.5 g/dL    A-G Ratio 1.4 g/dL   LIPASE   Result Value Ref Range    Lipase 33 11 -  82 U/L   TROPONIN   Result Value Ref Range    Troponin T <6 6 - 19 ng/L   ESTIMATED GFR   Result Value Ref Range    GFR If African American >60 >60 mL/min/1.73 m 2    GFR If Non African American >60 >60 mL/min/1.73 m 2   EKG   Result Value Ref Range    Report       [facility] Emergency Dept.    Test Date:  2021  Pt Name:    JOEL BROCK                Department: ER  MRN:        [id number]                      Room:       [facility]  Gender:     Female                       Technician: [id number]  :        1980                   Requested By:GERONIMO SHARMA  Order #:    [id number]                    Reading MD: GERONIMO SHARMA DO    Measurements  Intervals                                Axis  Rate:       58                           P:          -19  ME:         192                          QRS:        60  QRSD:       106                          T:          49  QT:         428  QTc:        421    Interpretive Statements  SINUS BRADYCARDIA  BORDERLINE INTRAVENTRICULAR CONDUCTION DELAY  Compared to ECG 2019 07:27:36  Sinus rhythm no longer present  Electronically Signed On 2021 21:10:04 PDT by GERONIMO SHARMA DO     POCT glucose device results   Result Value Ref Range    Glucose - Accu-Ck 112 (H) 65 - 99 mg/dL     Lumbar Puncture Procedure Note    Indication: worst headache of patient's life, to obtain spinal fluid for diagnostic testing and to rule out subarachnoid hemorrhage    Consent: The patient was counseled regarding the procedure, it's indications, risks, potential complications and alternatives and any questions were answered. Consent was obtained.    Procedure: The patient was placed in the right lateral decubitus position and the appropriate landmarks were identified. The area was prepped and draped in the usual sterile fashion. Anesthesia was obtained using 5 cc of 1% Lidocaine without epinephrine. A spinal needle was inserted at the L4- L5 level with the  stylet in place until spinal fluid was returned. Opening pressure was not measured. At this point 4.0 cc of clear cerebral spinal fluid was obtained and sent for appropriate testing. The stylet was then replaced and the needle was withdrawn. A sterile dressing was placed over the site and the patient was placed in the supine position.    The patient tolerated the procedure well.    Complications: None    COURSE & MEDICAL DECISION MAKING  Pertinent Labs & Imaging studies reviewed. (See chart for details)  This is a pleasant 40-year-old female presents with severe headache, weakness, nausea, vomiting, chest pain.  At the patient did receive a Mark & Mark vaccine for Covid on eighth of this month.  Is concern for possible she cannot evaluate for initial CT scan was complete was negative.  She has no evidence of leukocytosis she has no active fever and not believe she has encephalitis or meningitis.  In addition, she has no electrolyte derangements here in the emergency department.  As for her chest discomfort, EKG is negative, troponin is negative,, she has a heart score of 1 believe she is experiencing acute coronary syndrome or cardiac etiology for this reason.  Lumbar puncture ensued following this and results are pending at this point.  I discussed the patient with Dr. Jovel who recommends hospitalization for MR I, MRA arm RV of the brain and secondary to possible thrombosis in lieu of her recent Mark & Mark vaccine.  For this reason, discussed the patient with Dr. Zapien who graciously excepts hospitalization of this patient.  MRI of the brain as well as MRA and MRV have been ordered stat and Dr. Zapien will be following up on this and possible he will be contacting Dr. Jovel.  The patient received Reglan and Benadryl for her headache with minimal improvement.  She has no focal neurological deficits currently and will be hospitalized for further evaluation, MRI evaluation, CSF follow-up as well as  possible neurology consultation.    FINAL IMPRESSION  Severe intractable headache  Chest pain         Electronically signed by: Zaheer Lucas D.O., 4/13/2021 9:09 PM

## 2021-04-16 ENCOUNTER — OFFICE VISIT (OUTPATIENT)
Dept: MEDICAL GROUP | Facility: MEDICAL CENTER | Age: 41
End: 2021-04-16
Attending: INTERNAL MEDICINE
Payer: MEDICAID

## 2021-04-16 VITALS
SYSTOLIC BLOOD PRESSURE: 112 MMHG | RESPIRATION RATE: 16 BRPM | WEIGHT: 254 LBS | TEMPERATURE: 97.7 F | HEART RATE: 92 BPM | BODY MASS INDEX: 40.82 KG/M2 | DIASTOLIC BLOOD PRESSURE: 78 MMHG | HEIGHT: 66 IN | OXYGEN SATURATION: 95 %

## 2021-04-16 DIAGNOSIS — Z09 HOSPITAL DISCHARGE FOLLOW-UP: ICD-10-CM

## 2021-04-16 PROBLEM — G47.33 OBSTRUCTIVE SLEEP APNEA SYNDROME: Status: ACTIVE | Noted: 2021-04-16

## 2021-04-16 PROBLEM — J45.909 ASTHMA WITHOUT STATUS ASTHMATICUS: Status: ACTIVE | Noted: 2021-04-16

## 2021-04-16 LAB
BACTERIA CSF CULT: NORMAL
GRAM STN SPEC: NORMAL
SIGNIFICANT IND 70042: NORMAL
SITE SITE: NORMAL
SOURCE SOURCE: NORMAL

## 2021-04-16 PROCEDURE — 99212 OFFICE O/P EST SF 10 MIN: CPT | Performed by: INTERNAL MEDICINE

## 2021-04-16 PROCEDURE — 99213 OFFICE O/P EST LOW 20 MIN: CPT | Performed by: INTERNAL MEDICINE

## 2021-04-16 RX ORDER — ONDANSETRON 4 MG/1
4 TABLET, ORALLY DISINTEGRATING ORAL EVERY 6 HOURS PRN
Qty: 10 TABLET | Refills: 1 | Status: SHIPPED | OUTPATIENT
Start: 2021-04-16 | End: 2021-05-26

## 2021-04-16 RX ORDER — IBUPROFEN 600 MG/1
600 TABLET ORAL 3 TIMES DAILY PRN
Qty: 45 TABLET | Refills: 0 | Status: SHIPPED | OUTPATIENT
Start: 2021-04-16 | End: 2021-05-26

## 2021-04-16 ASSESSMENT — FIBROSIS 4 INDEX: FIB4 SCORE: 0.98

## 2021-04-16 NOTE — ASSESSMENT & PLAN NOTE
"She presents today for hospital discharge follow-up.  She was recently hospitalized at [facility] from 4/13/2021 to 4/14/2021 after presenting with some neurological symptoms and headache following the Mark & Mark vaccine.  She underwent extensive brain imaging as well as a lumbar puncture.  Everything came back unremarkable.  She does report a persistent headache with some left-sided neck pain.  It is somewhat improved from when she left the hospital but still present.  She has never had similar headaches in the past and has never had migraines.  She does report some nausea, photophobia, phonophobia.  She also reports muscle aches in her arms and legs.  She has been taking Tylenol over-the-counter 1000 mg 3 times daily which helps a little bit with her headaches.  She also reports persistent \"brain fog\".  There have not been any recent changes to her medications except for the addition of Bactrim for 3 days as she was found to have a UTI during her hospital stay.  "

## 2021-04-16 NOTE — PROGRESS NOTES
"Subjective:   Tracy Calderon is a 40 y.o. female here today for hospital discharge follow-up    Hospital discharge follow-up  She presents today for hospital discharge follow-up.  She was recently hospitalized at [facility] from 4/13/2021 to 4/14/2021 after presenting with some neurological symptoms and headache following the [employer] vaccine.  She underwent extensive brain imaging as well as a lumbar puncture.  Everything came back unremarkable.  She does report a persistent headache with some left-sided neck pain.  It is somewhat improved from when she left the hospital but still present.  She has never had similar headaches in the past and has never had migraines.  She does report some nausea, photophobia, phonophobia.  She also reports muscle aches in her arms and legs.  She has been taking Tylenol over-the-counter 1000 mg 3 times daily which helps a little bit with her headaches.  She also reports persistent \"brain fog\".  There have not been any recent changes to her medications except for the addition of Bactrim for 3 days as she was found to have a UTI during her hospital stay.       Current medicines (including changes today)  Current Outpatient Medications   Medication Sig Dispense Refill   • ibuprofen (MOTRIN) 600 MG Tab Take 1 tablet by mouth 3 times a day as needed for Moderate Pain. 45 tablet 0   • ondansetron (ZOFRAN ODT) 4 MG TABLET DISPERSIBLE Take 1 tablet by mouth every 6 hours as needed for Nausea. 10 tablet 1   • sulfamethoxazole-trimethoprim (BACTRIM DS) 800-160 MG tablet Take 1 tablet by mouth every day for 3 days. 3 tablet 0   • acetaminophen (TYLENOL) 500 MG Tab Take 1,000 mg by mouth 2 times a day as needed (Pain).     • clonazePAM (KLONOPIN) 1 MG Tab Take 1 mg by mouth 3 times a day.     • atorvastatin (LIPITOR) 40 MG Tab TAKE 1 TABLET BY MOUTH ONCE DAILY IN THE EVENING (Patient taking differently: Take 40 mg by mouth every evening.) 30 tablet 11   • traZODone (DESYREL) 150 MG " Tab Take 150 mg by mouth at bedtime.     • albuterol (VENTOLIN HFA) 108 (90 Base) MCG/ACT Aero Soln inhalation aerosol INHALE TWO PUFFS BY MOUTH EVERY 6 HOURS AS NEEDED FOR SHORTNESS OF BREATH (Patient taking differently: Inhale 2 Puffs every 6 hours as needed for Shortness of Breath.) 1 Inhaler 5   • citalopram (CELEXA) 40 MG Tab Take 40 mg by mouth every morning.     • prazosin (MINIPRESS) 5 MG Cap Take 10 mg by mouth every evening. 2 caps = 10 mg     • albuterol (PROVENTIL) 2.5mg/3ml Nebu Soln solution for nebulization 3 mL by Nebulization route every four hours as needed. AS NEEDED FOR SHORTNESS OF BREATH (Patient not taking: Reported on 4/13/2021) 360 mL 3     No current facility-administered medications for this visit.     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    ROS   Denies chest pain, shortness of breath  As above in HPI     Objective:     Vitals:    04/16/21 0823   BP: 112/78   Pulse: 92   Resp: 16   Temp: 36.5 °C (97.7 °F)   SpO2: 95%     Body mass index is 41.02 kg/m².   Physical Exam:  Constitutional: Alert, no distress.  Skin: Warm, dry, good turgor, no rashes in visible areas.  Eye: Equal, round and reactive, conjunctiva clear, lids normal.  ENMT: TMs are clear bilaterally  Neck: Tenderness to palpation over bilateral cervical paraspinal muscles, left worse than right with extension into the base of the skull      Results and Imaging:   Lab Results   Component Value Date/Time    WBC 6.8 04/14/2021 04:42 AM    RBC 4.67 04/14/2021 04:42 AM    HEMOGLOBIN 15.3 04/14/2021 04:42 AM    HEMATOCRIT 45.8 04/14/2021 04:42 AM    MCV 98.1 (H) 04/14/2021 04:42 AM    MCH 32.8 04/14/2021 04:42 AM    MCHC 33.4 (L) 04/14/2021 04:42 AM     MPV 10.2 04/14/2021 04:42 AM    NEUTSPOLYS 52.00 04/14/2021 04:42 AM    LYMPHOCYTES 35.80 04/14/2021 04:42 AM    MONOCYTES 7.80 04/14/2021 04:42 AM    EOSINOPHILS 3.40 04/14/2021 04:42 AM    BASOPHILS 0.90 04/14/2021 04:42 AM      Lab Results   Component Value Date/Time    CHOLSTRLTOT 156 04/14/2021 04:42 AM    LDL 92 04/14/2021 04:42 AM    HDL 41 04/14/2021 04:42 AM    TRIGLYCERIDE 113 04/14/2021 04:42 AM       Lab Results   Component Value Date/Time    SODIUM 139 04/13/2021 06:24 PM    POTASSIUM 4.4 04/13/2021 06:24 PM    CHLORIDE 105 04/13/2021 06:24 PM    CO2 26 04/13/2021 06:24 PM    GLUCOSE 85 04/13/2021 06:24 PM    BUN 15 04/13/2021 06:24 PM    CREATININE 0.70 04/13/2021 06:24 PM    CREATININE 0.9 09/25/2008 08:18 AM     Lab Results   Component Value Date/Time    ALKPHOSPHAT 63 04/13/2021 06:24 PM    ASTSGOT 31 04/13/2021 06:24 PM    ALTSGPT 25 04/13/2021 06:24 PM    TBILIRUBIN 0.3 04/13/2021 06:24 PM      MRI Brain (4/14/21):  FINDINGS:  The calvariae are unremarkable. There are no extra-axial fluid collections. The ventricular system and basal cisterns are within normal limits. There are no areas of abnormal signal in the brain substance. There are no mass effects or shift of midline   structures. There are no hemorrhagic lesions. The diffusion-weighted axial images show no evidence of acute cerebral infarction.     The brainstem and posterior fossa structures are unremarkable. A partially empty sella is noted.     Vascular flow voids in the vertebrobasilar and carotid arteries, [place] of [place], and dural venous sinuses are intact.     The paranasal sinuses demonstrate mild mucosal inflammatory changes inferiorly in the right maxillary sinus.     IMPRESSION:        1.  Partially empty sella otherwise unremarkable MRI scan of the brain without contrast.    MR venogram (4/14/21)  FINDINGS:     There is excellent flow signal intensity in the superior sagittal sinus, transverse sinuses, and sigmoid sinuses.   The deep venous structures are widely patent.  There is no evidence of dural venous sinus thrombosis.     IMPRESSION:        Normal MR venogram of the brain without contrast.    MRA head (4/14/21):  FINDINGS:     MRA of the [place] of [place] shows no aneurysm or occlusive disease in the anterior circulation.  The posterior circulation shows the vertebrobasilar confluence to be intact.  There is no aneurysm or occlusive lesion.     IMPRESSION:        MRA OF THE [place] OF [place] WITHIN NORMAL LIMITS.    Assessment and Plan:   The following treatment plan was discussed    1. Hospital discharge follow-up  We reviewed her hospital course in detail.  Thankfully, all of her brain imaging came back unremarkable as did her lumbar puncture.  Etiology of her symptoms is unclear although her headache does have some migrainous features.  We discussed that if she gets 1 of these headaches in the future to let me know, and we discussed classic features of migraines including nausea, pounding character, photophobia, phonophobia.  For now, will start her on ibuprofen to help with the myalgias and residual headaches as I do think some muscle tension in her neck is also contributing.  Prescription for Zofran also given to help with her nausea.  Letter written so that she can rest with minimal activity participation in her program for the next week or so as she recovers.  She will follow-up with us if no improvement in her symptoms.  - ibuprofen (MOTRIN) 600 MG Tab; Take 1 tablet by mouth 3 times a day as needed for Moderate Pain.  Dispense: 45 tablet; Refill: 0  - ondansetron (ZOFRAN ODT) 4 MG TABLET DISPERSIBLE; Take 1 tablet by mouth every 6 hours as needed for Nausea.  Dispense: 10 tablet; Refill: 1        Followup: Return if symptoms worsen or fail to improve.

## 2021-04-16 NOTE — LETTER
April 16, 2021        To whom it may concern:    Tracy Calderon is currently a patient under my care at the [facility].  She was recently hospitalized and is still feeling unwell.  Please allow her to rest with minimal activity for the next 5-7 days as she recovers.  She can engage in more activity as she improves and sooner if she is able.    If you have any questions or concerns, please don't hesitate to call.        Sincerely,        Ester Srinivasan M.D.    Electronically Signed

## 2021-04-27 LAB
BURR CELLS/RBC NFR CSF MANUAL: 0 %
BURR CELLS/RBC NFR CSF MANUAL: 0 %
CLARITY CSF: CLEAR
CLARITY CSF: CLEAR
COLOR CSF: COLORLESS
COLOR CSF: COLORLESS
COLOR SPUN CSF: COLORLESS
CSF COMMENTS 1658: NORMAL
LYMPHOCYTES NFR CSF: 85 %
LYMPHOCYTES NFR CSF: 91 %
MONONUC CELLS NFR CSF: 14 %
MONONUC CELLS NFR CSF: 8 %
NEUTROPHILS NFR CSF: 1 %
NEUTROPHILS NFR CSF: 1 %
RBC # CSF: 13 CELLS/UL
RBC # CSF: 94 CELLS/UL
SPECIMEN VOL CSF: 4 ML
SPECIMEN VOL CSF: 4 ML
TUBE # CSF: 2
TUBE # CSF: 4
WBC # CSF: <1 CELLS/UL (ref 0–10)
WBC # CSF: <1 CELLS/UL (ref 0–10)

## 2021-05-11 ENCOUNTER — OFFICE VISIT (OUTPATIENT)
Dept: URGENT CARE | Facility: CLINIC | Age: 41
End: 2021-05-11

## 2021-05-11 ENCOUNTER — APPOINTMENT (OUTPATIENT)
Dept: URGENT CARE | Facility: CLINIC | Age: 41
End: 2021-05-11
Payer: MEDICAID

## 2021-05-11 ENCOUNTER — HOSPITAL ENCOUNTER (OUTPATIENT)
Facility: MEDICAL CENTER | Age: 41
End: 2021-05-11
Attending: FAMILY MEDICINE
Payer: MEDICAID

## 2021-05-11 ENCOUNTER — APPOINTMENT (OUTPATIENT)
Dept: RADIOLOGY | Facility: IMAGING CENTER | Age: 41
End: 2021-05-11
Attending: FAMILY MEDICINE
Payer: MEDICAID

## 2021-05-11 VITALS
TEMPERATURE: 97.5 F | WEIGHT: 251 LBS | BODY MASS INDEX: 40.34 KG/M2 | OXYGEN SATURATION: 92 % | SYSTOLIC BLOOD PRESSURE: 132 MMHG | HEART RATE: 90 BPM | RESPIRATION RATE: 16 BRPM | HEIGHT: 66 IN | DIASTOLIC BLOOD PRESSURE: 74 MMHG

## 2021-05-11 DIAGNOSIS — J45.20 MILD INTERMITTENT ASTHMA, UNSPECIFIED WHETHER COMPLICATED: ICD-10-CM

## 2021-05-11 DIAGNOSIS — J02.0 STREP THROAT: ICD-10-CM

## 2021-05-11 DIAGNOSIS — M54.16 LUMBAR RADICULOPATHY: ICD-10-CM

## 2021-05-11 DIAGNOSIS — R05.9 COUGH: ICD-10-CM

## 2021-05-11 LAB
COVID ORDER STATUS COVID19: NORMAL
INT CON NEG: NEGATIVE
INT CON POS: POSITIVE
S PYO AG THROAT QL: POSITIVE

## 2021-05-11 PROCEDURE — 87880 STREP A ASSAY W/OPTIC: CPT | Mod: QW | Performed by: FAMILY MEDICINE

## 2021-05-11 PROCEDURE — U0003 INFECTIOUS AGENT DETECTION BY NUCLEIC ACID (DNA OR RNA); SEVERE ACUTE RESPIRATORY SYNDROME CORONAVIRUS 2 (SARS-COV-2) (CORONAVIRUS DISEASE [COVID-19]), AMPLIFIED PROBE TECHNIQUE, MAKING USE OF HIGH THROUGHPUT TECHNOLOGIES AS DESCRIBED BY CMS-2020-01-R: HCPCS

## 2021-05-11 PROCEDURE — U0005 INFEC AGEN DETEC AMPLI PROBE: HCPCS

## 2021-05-11 PROCEDURE — C9803 HOPD COVID-19 SPEC COLLECT: HCPCS

## 2021-05-11 PROCEDURE — 99214 OFFICE O/P EST MOD 30 MIN: CPT | Performed by: FAMILY MEDICINE

## 2021-05-11 RX ORDER — METHYLPREDNISOLONE 4 MG/1
TABLET ORAL
Qty: 21 TABLET | Refills: 0 | Status: SHIPPED | OUTPATIENT
Start: 2021-05-11 | End: 2021-05-26

## 2021-05-11 RX ORDER — AZITHROMYCIN 250 MG/1
TABLET, FILM COATED ORAL
Qty: 6 TABLET | Refills: 0 | Status: SHIPPED | OUTPATIENT
Start: 2021-05-11 | End: 2021-05-26

## 2021-05-11 RX ORDER — BENZONATATE 200 MG/1
200 CAPSULE ORAL 3 TIMES DAILY PRN
Qty: 45 CAPSULE | Refills: 0 | Status: SHIPPED | OUTPATIENT
Start: 2021-05-11 | End: 2021-05-26

## 2021-05-11 ASSESSMENT — FIBROSIS 4 INDEX: FIB4 SCORE: 0.98

## 2021-05-11 NOTE — PROGRESS NOTES
CC:  cough        Cough  This is a new problem. The current episode started 2 days ago. The problem has been unchanged. The problem occurs constantly. The cough is dry. Associated symptoms include : subj fever, sore throat.     She has asthma, but no improvement with albuterol.      #2.  C/o rt back pain that radiates down rt leg x 1 wk.   Pain is mild.    Not improved with motrin.   Denies saddle anesthesia, numbness or tingling.        Past Medical History:   Diagnosis Date   • Cricopharyngeal achalasia 3/12/2015   • COPD (chronic obstructive pulmonary disease) ([facility]) 2/17/2015   • History of substance abuse ([facility]) 2/17/2015    ETOH-quit 2013, meth-quit 2007   • Hyperlipidemia 2/17/2015   • Hyperlipidemia 2/17/2015   • Anxiety    • Asthma     will bring inhalers   • Depression    • ETOH abuse    • GERD (gastroesophageal reflux disease)          Social History     Tobacco Use   • Smoking status: Current Every Day Smoker     Packs/day: 1.00     Years: 25.00     Pack years: 25.00     Types: Cigarettes   • Smokeless tobacco: Never Used   Substance Use Topics   • Alcohol use: No     Comment: history of alcoholism, quit Dec 2020.  1 pint vodka daily since age 14   • Drug use: Not Currently     Types: Marijuana, Inhaled     Comment: history of meth use, quit 2007 never IV, marijuana  until Dec 2020         Current Outpatient Medications on File Prior to Visit   Medication Sig Dispense Refill   • ibuprofen (MOTRIN) 600 MG Tab Take 1 tablet by mouth 3 times a day as needed for Moderate Pain. 45 tablet 0   • ondansetron (ZOFRAN ODT) 4 MG TABLET DISPERSIBLE Take 1 tablet by mouth every 6 hours as needed for Nausea. 10 tablet 1   • acetaminophen (TYLENOL) 500 MG Tab Take 1,000 mg by mouth 2 times a day as needed (Pain).     • clonazePAM (KLONOPIN) 1 MG Tab Take 1 mg by mouth 3 times a day.     • atorvastatin (LIPITOR) 40 MG Tab TAKE 1 TABLET BY MOUTH ONCE DAILY IN THE EVENING (Patient taking differently: Take 40 mg by mouth  "every evening.) 30 tablet 11   • traZODone (DESYREL) 150 MG Tab Take 150 mg by mouth at bedtime.     • albuterol (VENTOLIN HFA) 108 (90 Base) MCG/ACT Aero Soln inhalation aerosol INHALE TWO PUFFS BY MOUTH EVERY 6 HOURS AS NEEDED FOR SHORTNESS OF BREATH (Patient taking differently: Inhale 2 Puffs every 6 hours as needed for Shortness of Breath.) 1 Inhaler 5   • citalopram (CELEXA) 40 MG Tab Take 40 mg by mouth every morning.     • albuterol (PROVENTIL) 2.5mg/3ml Nebu Soln solution for nebulization 3 mL by Nebulization route every four hours as needed. AS NEEDED FOR SHORTNESS OF BREATH (Patient not taking: Reported on 4/13/2021) 360 mL 3   • prazosin (MINIPRESS) 5 MG Cap Take 10 mg by mouth every evening. 2 caps = 10 mg       No current facility-administered medications on file prior to visit.                    Review of Systems   Constitutional: + for fever    HENT: negative for otalgia  Cardiovascular - denies chest pain or dyspnea  Respiratory: Positive for cough.  .  Negative for wheezing.    Neurological: Negative for headaches.   GI - denies nausea, vomiting or diarrhea  Neuro - denies numbness or tingling.            Objective:     /74 (BP Location: Left arm, Patient Position: Sitting, BP Cuff Size: Adult long)   Pulse 90   Temp 36.4 °C (97.5 °F) (Temporal)   Resp 16   Ht 1.676 m (5' 6\")   Wt 114 kg (251 lb)   SpO2 92%     Physical Exam   Constitutional: patient is oriented to person, place, and time. Patient appears well-developed and well-nourished. No distress.   HENT:   Head: Normocephalic and atraumatic.   Right Ear: External ear normal.   Left Ear: External ear normal.   Nose: Mucosal edema  present. Right sinus exhibits no maxillary sinus tenderness. Left sinus exhibits no maxillary sinus tenderness.   Mouth/Throat: Mucous membranes are normal. No oral lesions.  No posterior pharyngeal erythema.  No oropharyngeal exudate or posterior oropharyngeal edema.   Eyes: Conjunctivae and EOM are " normal. Pupils are equal, round, and reactive to light. Right eye exhibits no discharge. Left eye exhibits no discharge. No scleral icterus.   Neck: Normal range of motion. Neck supple. No tracheal deviation present.   Cardiovascular: Normal rate, regular rhythm and normal heart sounds.  Exam reveals no friction rub.    Pulmonary/Chest: Effort normal. No respiratory distress. Patient has no wheezes or rhonchi. Patient has no rales.    Musculoskeletal:   Lumbar spine - there is TTP over rt side.    Full AROM.   SLR neg  Lymphadenopathy:     Patient has no cervical adenopathy.      Neurological: patient is alert and oriented to person, place, and time.   Skin: Skin is warm and dry. No rash noted. No erythema.   Psychiatric: patient  has a normal mood and affect.  behavior is normal.   Nursing note and vitals reviewed.              Assessment/Plan:         Strep throat  Rapid strep positive     PCN allergic    - azithromycin (ZITHROMAX) 250 MG Tab; Take as directed  Dispense: 6 tablet; Refill: 0  - benzonatate (TESSALON) 200 MG capsule; Take 1 capsule by mouth 3 times a day as needed for Cough.  Dispense: 45 capsule;        Will send screen for COVID     Follow up in one week if no improvement, sooner if symptoms worsen.       2. Mild intermittent asthma, unspecified whether complicated      albuterol, prn    - methylPREDNISolone (MEDROL DOSEPAK) 4 MG Tablet Therapy Pack; Follow schedule on package instructions.  Dispense: 21 tablet; Refill: 0    3. Lumbar radiculopathy  rx motrin 800mg tid prn      - methylPREDNISolone (MEDROL DOSEPAK) 4 MG Tablet Therapy Pack; Follow schedule on package  Follow up in one week if no improvement

## 2021-05-12 LAB
SARS-COV-2 RNA RESP QL NAA+PROBE: NOTDETECTED
SPECIMEN SOURCE: NORMAL

## 2021-05-26 ENCOUNTER — OFFICE VISIT (OUTPATIENT)
Dept: MEDICAL GROUP | Facility: MEDICAL CENTER | Age: 41
End: 2021-05-26
Attending: INTERNAL MEDICINE
Payer: MEDICAID

## 2021-05-26 VITALS
TEMPERATURE: 98.1 F | BODY MASS INDEX: 40.34 KG/M2 | RESPIRATION RATE: 16 BRPM | DIASTOLIC BLOOD PRESSURE: 70 MMHG | HEART RATE: 80 BPM | HEIGHT: 66 IN | OXYGEN SATURATION: 94 % | SYSTOLIC BLOOD PRESSURE: 108 MMHG | WEIGHT: 251 LBS

## 2021-05-26 DIAGNOSIS — J45.909 ASTHMA WITHOUT STATUS ASTHMATICUS WITHOUT COMPLICATION, UNSPECIFIED ASTHMA SEVERITY, UNSPECIFIED WHETHER PERSISTENT: ICD-10-CM

## 2021-05-26 DIAGNOSIS — Z30.011 ENCOUNTER FOR INITIAL PRESCRIPTION OF CONTRACEPTIVE PILLS: ICD-10-CM

## 2021-05-26 PROCEDURE — 99213 OFFICE O/P EST LOW 20 MIN: CPT | Performed by: INTERNAL MEDICINE

## 2021-05-26 RX ORDER — ACETAMINOPHEN AND CODEINE PHOSPHATE 120; 12 MG/5ML; MG/5ML
1 SOLUTION ORAL DAILY
Qty: 28 TABLET | Refills: 5 | Status: SHIPPED | OUTPATIENT
Start: 2021-05-26 | End: 2023-11-26

## 2021-05-26 RX ORDER — ALBUTEROL SULFATE 2.5 MG/3ML
2.5 SOLUTION RESPIRATORY (INHALATION) EVERY 4 HOURS PRN
Qty: 360 ML | Refills: 3 | Status: SHIPPED | OUTPATIENT
Start: 2021-05-26 | End: 2023-11-26 | Stop reason: SDUPTHER

## 2021-05-26 ASSESSMENT — FIBROSIS 4 INDEX: FIB4 SCORE: 0.98

## 2021-05-27 NOTE — ASSESSMENT & PLAN NOTE
She presents today to discuss birth control.  States that she has never been on anything in the past.  She is planning to become sexually active soon and would like to prevent pregnancy.  She is not interested in permanent method such as tubal ligation.  She is reluctant to have any type of procedure such as IUD insertion or Nexplanon insertion.  She is interested in starting on OCPs.  LMP was approximately 8 days ago and she has not been sexually active since then.  Typically she has irregular cycles.

## 2021-05-27 NOTE — PROGRESS NOTES
Subjective:   Tracy Calderon is a 40 y.o. female here today for discuss birth control    Encounter for initial prescription of contraceptive pills  She presents today to discuss birth control.  States that she has never been on anything in the past.  She is planning to become sexually active soon and would like to prevent pregnancy.  She is not interested in permanent method such as tubal ligation.  She is reluctant to have any type of procedure such as IUD insertion or Nexplanon insertion.  She is interested in starting on OCPs.  LMP was approximately 8 days ago and she has not been sexually active since then.  Typically she has irregular cycles.       Current medicines (including changes today)  Current Outpatient Medications   Medication Sig Dispense Refill   • albuterol (PROVENTIL) 2.5mg/3ml Nebu Soln solution for nebulization Take 3 mL by nebulization every four hours as needed. AS NEEDED FOR SHORTNESS OF BREATH 360 mL 3   • norethindrone (MICRONOR) 0.35 MG tablet Take 1 tablet by mouth every day. 28 tablet 5   • acetaminophen (TYLENOL) 500 MG Tab Take 1,000 mg by mouth 2 times a day as needed (Pain).     • clonazePAM (KLONOPIN) 1 MG Tab Take 1 mg by mouth 3 times a day.     • atorvastatin (LIPITOR) 40 MG Tab TAKE 1 TABLET BY MOUTH ONCE DAILY IN THE EVENING (Patient taking differently: Take 40 mg by mouth every evening.) 30 tablet 11   • traZODone (DESYREL) 150 MG Tab Take 150 mg by mouth at bedtime.     • citalopram (CELEXA) 40 MG Tab Take 40 mg by mouth every morning.     • prazosin (MINIPRESS) 5 MG Cap Take 10 mg by mouth every evening. 2 caps = 10 mg     • albuterol (VENTOLIN HFA) 108 (90 Base) MCG/ACT Aero Soln inhalation aerosol INHALE TWO PUFFS BY MOUTH EVERY 6 HOURS AS NEEDED FOR SHORTNESS OF BREATH (Patient taking differently: Inhale 2 Puffs every 6 hours as needed for Shortness of Breath.) 1 Inhaler 5     No current facility-administered medications for this visit.     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    ROS   Denies chest pain, shortness of breath  As above in HPI     Objective:     Vitals:    05/26/21 1752   BP: 108/70   Pulse: 80   Resp: 16   Temp: 36.7 °C (98.1 °F)   SpO2: 94%     Body mass index is 40.53 kg/m².   Physical Exam:  Constitutional: Alert, no distress.  Skin: Warm, dry, good turgor, no rashes in visible areas.  Eye: Equal, round and reactive, conjunctiva clear, lids normal.  Psych: Alert and oriented x3, normal affect and mood.      Assessment and Plan:   The following treatment plan was discussed    1. Encounter for initial prescription of contraceptive pills  We discussed all options in detail and all of her questions were answered.  At this point she prefers to start on OCPs.  Given her age, discussed that progesterone only method is preferred.  Discussed that she may start the pills now although her cycle may be a little bit irregular this month.  Discussed using a backup method such as condoms for the first week or abstaining from sex in order to ensure full efficacy.  Discussed importance of taking pills daily and at the same time each day for maximum protection against pregnancy  - norethindrone (MICRONOR) 0.35 MG tablet; Take 1 tablet by mouth every day.  Dispense: 28 tablet; Refill: 5    2. Asthma without status asthmaticus without complication, unspecified asthma severity, unspecified whether persistent  - albuterol (PROVENTIL) 2.5mg/3ml Nebu Soln solution for nebulization; Take 3 mL by nebulization every four hours as needed. AS NEEDED FOR SHORTNESS OF BREATH  Dispense: 360 mL; Refill: 3        Followup: Return if symptoms worsen or fail to improve.

## 2021-06-09 DIAGNOSIS — E78.5 DYSLIPIDEMIA: ICD-10-CM

## 2021-06-09 RX ORDER — ATORVASTATIN CALCIUM 40 MG/1
40 TABLET, FILM COATED ORAL EVERY EVENING
Qty: 30 TABLET | Refills: 11 | Status: SHIPPED | OUTPATIENT
Start: 2021-06-09 | End: 2023-11-26

## 2021-06-11 ENCOUNTER — OFFICE VISIT (OUTPATIENT)
Dept: MEDICAL GROUP | Facility: MEDICAL CENTER | Age: 41
End: 2021-06-11
Attending: INTERNAL MEDICINE
Payer: MEDICAID

## 2021-06-11 VITALS
BODY MASS INDEX: 40.66 KG/M2 | WEIGHT: 253 LBS | RESPIRATION RATE: 16 BRPM | DIASTOLIC BLOOD PRESSURE: 78 MMHG | SYSTOLIC BLOOD PRESSURE: 124 MMHG | TEMPERATURE: 97.9 F | HEART RATE: 110 BPM | OXYGEN SATURATION: 94 % | HEIGHT: 66 IN

## 2021-06-11 DIAGNOSIS — I87.2 EDEMA OF BOTH LOWER EXTREMITIES DUE TO PERIPHERAL VENOUS INSUFFICIENCY: ICD-10-CM

## 2021-06-11 DIAGNOSIS — E66.01 MORBID OBESITY WITH BMI OF 40.0-44.9, ADULT (HCC): ICD-10-CM

## 2021-06-11 DIAGNOSIS — R10.11 RUQ PAIN: ICD-10-CM

## 2021-06-11 PROBLEM — Z09 HOSPITAL DISCHARGE FOLLOW-UP: Status: RESOLVED | Noted: 2021-04-16 | Resolved: 2021-06-11

## 2021-06-11 PROCEDURE — 99214 OFFICE O/P EST MOD 30 MIN: CPT | Performed by: INTERNAL MEDICINE

## 2021-06-11 PROCEDURE — 99212 OFFICE O/P EST SF 10 MIN: CPT | Performed by: INTERNAL MEDICINE

## 2021-06-11 RX ORDER — ARIPIPRAZOLE 10 MG/1
2 TABLET ORAL DAILY
COMMUNITY
End: 2023-11-26

## 2021-06-11 ASSESSMENT — FIBROSIS 4 INDEX: FIB4 SCORE: 0.98

## 2021-06-11 NOTE — PROGRESS NOTES
Subjective:   Tracy Calderon is a 40 y.o. female here today for abdominal pain, leg swelling    RUQ pain  She reports being diagnosed in the past with gallstones.  Lately, she has had episodes of right upper quadrant pain lasting for a few hours that she rates as a 6 out of 10 in severity.  They have been occurring every few days.  States that the pain eventually improves on its own and she has not noticed any alleviating factors.  It does tend to be worse if she eats fatty food and she is worried about her gallbladder.  She denies fevers and chills.  She denies heartburn, diarrhea, constipation, melena, hematochezia.      Edema of both lower extremities due to peripheral venous insufficiency  She reports lately noticing swelling in both of her legs at the end of the day.  She is on her feet all day long.  She notes that the swelling is better in the mornings.  She also has some pain in her legs.  No history of heart failure or liver failure.       Current medicines (including changes today)  Current Outpatient Medications   Medication Sig Dispense Refill   • ARIPiprazole (ABILIFY) 10 MG Tab Take 2 mg by mouth every day.     • atorvastatin (LIPITOR) 40 MG Tab Take 1 tablet by mouth every evening. 30 tablet 11   • albuterol (PROVENTIL) 2.5mg/3ml Nebu Soln solution for nebulization Take 3 mL by nebulization every four hours as needed. AS NEEDED FOR SHORTNESS OF BREATH 360 mL 3   • norethindrone (MICRONOR) 0.35 MG tablet Take 1 tablet by mouth every day. 28 tablet 5   • acetaminophen (TYLENOL) 500 MG Tab Take 1,000 mg by mouth 2 times a day as needed (Pain).     • clonazePAM (KLONOPIN) 1 MG Tab Take 1 mg by mouth 3 times a day.     • traZODone (DESYREL) 150 MG Tab Take 150 mg by mouth at bedtime.     • albuterol (VENTOLIN HFA) 108 (90 Base) MCG/ACT Aero Soln inhalation aerosol INHALE TWO PUFFS BY MOUTH EVERY 6 HOURS AS NEEDED FOR SHORTNESS OF BREATH (Patient taking differently: Inhale 2 Puffs every 6 hours as  needed for Shortness of Breath.) 1 Inhaler 5   • citalopram (CELEXA) 40 MG Tab Take 40 mg by mouth every morning.     • prazosin (MINIPRESS) 5 MG Cap Take 10 mg by mouth every evening. 2 caps = 10 mg       No current facility-administered medications for this visit.     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    ROS   Denies chest pain, shortness of breath  As above in HPI     Objective:     Vitals:    06/11/21 0826   BP: 124/78   Pulse: (!) 110   Resp: 16   Temp: 36.6 °C (97.9 °F)   SpO2: 94%     Body mass index is 40.86 kg/m².   Physical Exam:  Constitutional: Alert, no distress.  Skin: Warm, dry, good turgor, no rashes in visible areas.  Eye: Equal, round and reactive, conjunctiva clear, lids normal.  Cardiovascular: Regular rate and rhythm, no murmurs appreciated, no lower extremity edema, some prominent lower extremity veins but no severe varicosities  Abdomen: Soft, mild tenderness to palpation over RUQ, no masses, no hepatosplenomegaly.        Assessment and Plan:   The following treatment plan was discussed    1. RUQ pain  Suspect symptomatic cholelithiasis, will obtain RUQ US and refer to surgery if needed  - US-RUQ; Future    2. Morbid obesity with BMI of 40.0-44.9, adult ([facility])  Discussed weight loss as helpful for leg swelling, she plans to start water aerobics  - Patient identified as having weight management issue.  Appropriate orders and counseling given.    3. Edema of both lower extremities due to peripheral venous insufficiency  Secondary to venous insufficiency.  Discussed leg elevation, compression socks, weight loss as treatment initially        Followup: Return if symptoms worsen or fail to improve.

## 2021-06-11 NOTE — ASSESSMENT & PLAN NOTE
She reports being diagnosed in the past with gallstones.  Lately, she has had episodes of right upper quadrant pain lasting for a few hours that she rates as a 6 out of 10 in severity.  They have been occurring every few days.  States that the pain eventually improves on its own and she has not noticed any alleviating factors.  It does tend to be worse if she eats fatty food and she is worried about her gallbladder.  She denies fevers and chills.  She denies heartburn, diarrhea, constipation, melena, hematochezia.

## 2021-06-11 NOTE — ASSESSMENT & PLAN NOTE
She reports lately noticing swelling in both of her legs at the end of the day.  She is on her feet all day long.  She notes that the swelling is better in the mornings.  She also has some pain in her legs.  No history of heart failure or liver failure.

## 2021-07-13 ENCOUNTER — HOSPITAL ENCOUNTER (OUTPATIENT)
Dept: RADIOLOGY | Facility: MEDICAL CENTER | Age: 41
End: 2021-07-13
Attending: INTERNAL MEDICINE
Payer: MEDICAID

## 2021-07-13 DIAGNOSIS — J45.20 MILD INTERMITTENT ASTHMA WITHOUT STATUS ASTHMATICUS WITHOUT COMPLICATION: ICD-10-CM

## 2021-07-13 DIAGNOSIS — R10.11 RUQ PAIN: ICD-10-CM

## 2021-07-13 PROCEDURE — 76705 ECHO EXAM OF ABDOMEN: CPT

## 2021-07-13 NOTE — TELEPHONE ENCOUNTER
Received request via: Pharmacy    Was the patient seen in the last year in this department? Yes    Does the patient have an active prescription (recently filled or refills available) for medication(s) requested? No   No appt scheduled at this time

## 2021-07-14 RX ORDER — ALBUTEROL SULFATE 90 UG/1
AEROSOL, METERED RESPIRATORY (INHALATION)
Qty: 6.7 G | Refills: 3 | Status: SHIPPED | OUTPATIENT
Start: 2021-07-14

## 2021-08-27 ENCOUNTER — OFFICE VISIT (OUTPATIENT)
Dept: URGENT CARE | Facility: CLINIC | Age: 41
End: 2021-08-27
Payer: MEDICAID

## 2021-08-27 ENCOUNTER — HOSPITAL ENCOUNTER (OUTPATIENT)
Facility: MEDICAL CENTER | Age: 41
End: 2021-08-27
Attending: NURSE PRACTITIONER
Payer: MEDICAID

## 2021-08-27 VITALS
RESPIRATION RATE: 20 BRPM | DIASTOLIC BLOOD PRESSURE: 70 MMHG | TEMPERATURE: 97.6 F | HEIGHT: 66 IN | OXYGEN SATURATION: 93 % | BODY MASS INDEX: 40.76 KG/M2 | HEART RATE: 98 BPM | WEIGHT: 253.6 LBS | SYSTOLIC BLOOD PRESSURE: 120 MMHG

## 2021-08-27 DIAGNOSIS — J45.909 ACUTE ASTHMATIC BRONCHITIS: ICD-10-CM

## 2021-08-27 PROCEDURE — 99214 OFFICE O/P EST MOD 30 MIN: CPT | Performed by: NURSE PRACTITIONER

## 2021-08-27 PROCEDURE — U0003 INFECTIOUS AGENT DETECTION BY NUCLEIC ACID (DNA OR RNA); SEVERE ACUTE RESPIRATORY SYNDROME CORONAVIRUS 2 (SARS-COV-2) (CORONAVIRUS DISEASE [COVID-19]), AMPLIFIED PROBE TECHNIQUE, MAKING USE OF HIGH THROUGHPUT TECHNOLOGIES AS DESCRIBED BY CMS-2020-01-R: HCPCS

## 2021-08-27 RX ORDER — PREDNISONE 10 MG/1
20 TABLET ORAL 2 TIMES DAILY
Qty: 20 TABLET | Refills: 0 | Status: SHIPPED | OUTPATIENT
Start: 2021-08-27 | End: 2021-09-01

## 2021-08-27 RX ORDER — RANITIDINE 150 MG/1
CAPSULE ORAL
COMMUNITY
End: 2023-11-26

## 2021-08-27 RX ORDER — BENZONATATE 100 MG/1
100 CAPSULE ORAL 3 TIMES DAILY PRN
Qty: 30 CAPSULE | Refills: 0 | Status: SHIPPED | OUTPATIENT
Start: 2021-08-27 | End: 2023-11-26

## 2021-08-27 ASSESSMENT — FIBROSIS 4 INDEX: FIB4 SCORE: 1

## 2021-08-28 NOTE — PROGRESS NOTES
Chief Complaint   Patient presents with   • Sore Throat     pt has a sore throat,headache, congestion, fatigue x 4 days        HISTORY OF PRESENT ILLNESS: Patient is a pleasant 41 y.o. female with a history of asthma who presents today due to symptoms which started 3 days ago. Pt reports a cough, rhinitis, sore throat, nausea, wheezing, chills, fatigue, malaise, and body aches.  She has been using her inhaler for symptom relief. Has tried OTC cold medications without significant relief of symptoms. No recent ABX use. No other aggravating or alleviating factors. Reports no known exposure to COVID-19, she has been vaccinated.       Patient Active Problem List    Diagnosis Date Noted   • RUQ pain 06/11/2021   • Encounter for initial prescription of contraceptive pills 05/26/2021   • Obstructive sleep apnea syndrome 04/16/2021   • Asthma without status asthmaticus 04/16/2021   • Hemangioma of liver 01/11/2019   • Chronic left-sided low back pain with left-sided sciatica 10/10/2017   • Morbid obesity with BMI of 40.0-44.9, adult (Formerly [facility]) 03/07/2017   • Edema of both lower extremities due to peripheral venous insufficiency 08/10/2015   • S/P nasal septoplasty 06/16/2015   • History of substance abuse ([facility]ly [facility]) 02/17/2015   • Hyperlipidemia 02/17/2015   • Chronic obstructive pulmonary disease ([facility]) 02/11/2015   • GERD (gastroesophageal reflux disease) 07/18/2013   • Tobacco abuse 07/18/2013       Allergies:Penicillins and Vicodin [hydrocodone-acetaminophen]    Current Outpatient Medications Ordered in Epic   Medication Sig Dispense Refill   • predniSONE (DELTASONE) 10 MG Tab Take 2 Tablets by mouth 2 times a day for 5 days. Take with food. 20 Tablet 0   • benzonatate (TESSALON) 100 MG Cap Take 1 Capsule by mouth 3 times a day as needed. 30 Capsule 0   • albuterol (VENTOLIN HFA) 108 (90 Base) MCG/ACT Aero Soln inhalation aerosol INHALE TWO PUFFS BY MOUTH EVERY 6 HOURS AS NEEDED FOR SHORTNESS OF BREATH 6.7 g 3   • atorvastatin  (LIPITOR) 40 MG Tab Take 1 tablet by mouth every evening. 30 tablet 11   • albuterol (PROVENTIL) 2.5mg/3ml Nebu Soln solution for nebulization Take 3 mL by nebulization every four hours as needed. AS NEEDED FOR SHORTNESS OF BREATH 360 mL 3   • clonazePAM (KLONOPIN) 1 MG Tab Take 1 mg by mouth 3 times a day.     • traZODone (DESYREL) 150 MG Tab Take 150 mg by mouth at bedtime.     • citalopram (CELEXA) 40 MG Tab Take 40 mg by mouth every morning.     • prazosin (MINIPRESS) 5 MG Cap Take 10 mg by mouth every evening. 2 caps = 10 mg     • ranitidine (ZANTAC) 150 MG capsule 1 cap(s)     • ARIPiprazole (ABILIFY) 10 MG Tab Take 2 mg by mouth every day. (Patient not taking: Reported on 8/27/2021)     • norethindrone (MICRONOR) 0.35 MG tablet Take 1 tablet by mouth every day. (Patient not taking: Reported on 8/27/2021) 28 tablet 5   • acetaminophen (TYLENOL) 500 MG Tab Take 1,000 mg by mouth 2 times a day as needed (Pain).       No current Epic-ordered facility-administered medications on file.       Past Medical History:   Diagnosis Date   • Anxiety    • Asthma     will bring inhalers   • COPD (chronic obstructive pulmonary disease) ([facility]) 2/17/2015   • Cricopharyngeal achalasia 3/12/2015   • Depression    • ETOH abuse    • GERD (gastroesophageal reflux disease)    • History of substance abuse ([facility]) 2/17/2015    ETOH-quit 2013, meth-quit 2007   • Hyperlipidemia 2/17/2015   • Hyperlipidemia 2/17/2015       Social History     Tobacco Use   • Smoking status: Current Every Day Smoker     Packs/day: 1.00     Years: 25.00     Pack years: 25.00     Types: Cigarettes   • Smokeless tobacco: Never Used   Vaping Use   • Vaping Use: Never used   Substance Use Topics   • Alcohol use: No     Comment: history of alcoholism, quit Dec 2020.  1 pint vodka daily since age 14   • Drug use: Yes     Types: Marijuana, Inhaled     Comment: history of meth use, quit 2007 never IV, marijuana  until Dec 2020       Family Status   Relation Name Status  "  • Mo  Alive   • Fa unknown    • MGFa  (Not Specified)   • MUnc  (Not Specified)   • Neg Hx  (Not Specified)     Family History   Problem Relation Age of Onset   • Lung Disease Mother    • Stroke Mother    • Thyroid Mother         Graves disease   • Hyperlipidemia Mother    • Heart Disease Maternal Grandfather    • Thyroid Maternal Uncle         Graves disease   • Cancer Neg Hx    • Diabetes Neg Hx        ROS:  Review of Systems   Constitutional: Positive for chills, fatigue, malaise. Negative for weight loss.  HENT: Positive for rhinitis, sore throat. Negative for ear pain, nosebleeds, and neck pain.    Eyes: Negative for vision changes.   Cardiovascular: Negative for chest pain, palpitations, orthopnea and leg swelling.   Respiratory: Positive for cough, wheezing.  Gastrointestinal: Negative for abdominal pain, vomiting or diarrhea.   Skin: Negative for rash, diaphoresis.     Exam:  /70 (BP Location: Right arm, Patient Position: Sitting, BP Cuff Size: Adult)   Pulse 98   Temp 36.4 °C (97.6 °F) (Temporal)   Resp 20   Ht 1.676 m (5' 6\")   Wt 115 kg (253 lb 9.6 oz)   SpO2 93%   General: well-nourished, well-developed female in NAD  Head: normocephalic, atraumatic  Eyes: PERRLA, EOM within normal limits, no conjunctival injection, no scleral icterus, visual fields and acuity grossly intact.  Ears: normal shape and symmetry, no tenderness, no discharge. External canals are without any significant edema or erythema. Tympanic membranes are without any inflammation, no effusion. Gross auditory acuity is intact.  Nose: symmetrical without tenderness, mild discharge, erythema present bilateral nares.  Mouth/Throat: reasonable hygiene, no exudates or tonsillar enlargement. Mild erythema present.   Neck: no masses, range of motion within normal limits, no tracheal deviation.  Lymph: mild cervical adenopathy. No supraclavicular adenopathy.   Neuro: alert and oriented. Cranial nerves 1-12 grossly intact. "   Cardiovascular: regular rate and rhythm without murmurs, rubs, or gallops. No edema.   Pulmonary: no distress. Chest is symmetrical with respiration. No crackles or rhonchi.  Wheezes noted left fields.  Musculoskeletal: appropriate muscle tone, gait is stable.  Skin: warm, dry, intact, no clubbing, no cyanosis.   Psych: appropriate mood, affect, judgement.         Assessment/Plan:  1. Acute asthmatic bronchitis  predniSONE (DELTASONE) 10 MG Tab    benzonatate (TESSALON) 100 MG Cap    COVID/SARS CoV-2 PCR           Discussed symptoms most likely viral, will test for COVID. Home quarantine advised. Discussed natural progression and sx care.  Prednisone as directed.  Tessalon and home albuterol as needed.  Rest, increase fluids, hand and respiratory hygiene. May take OTC medications as directed for symptom relief. STRICT ER precautions.   Supportive care, differential diagnoses, and indications for immediate follow-up discussed with patient.   Pathogenesis of diagnosis discussed including typical length and natural progression.  Instructed to return to clinic or nearest emergency department for any change in condition, further concerns, or worsening of symptoms.  Patient states understanding of the plan of care and discharge instructions.          RONN London.

## 2021-08-29 DIAGNOSIS — J45.909 ACUTE ASTHMATIC BRONCHITIS: ICD-10-CM

## 2021-08-30 LAB
COVID ORDER STATUS COVID19: NORMAL
SARS-COV-2 RNA RESP QL NAA+PROBE: NOTDETECTED
SPECIMEN SOURCE: NORMAL

## 2023-11-26 ENCOUNTER — HOSPITAL ENCOUNTER (OUTPATIENT)
Facility: MEDICAL CENTER | Age: 43
End: 2023-11-26
Attending: NURSE PRACTITIONER
Payer: MEDICAID

## 2023-11-26 ENCOUNTER — OFFICE VISIT (OUTPATIENT)
Dept: URGENT CARE | Facility: CLINIC | Age: 43
End: 2023-11-26
Payer: MEDICAID

## 2023-11-26 VITALS
OXYGEN SATURATION: 97 % | WEIGHT: 231 LBS | HEIGHT: 66 IN | HEART RATE: 110 BPM | TEMPERATURE: 96.9 F | DIASTOLIC BLOOD PRESSURE: 74 MMHG | RESPIRATION RATE: 16 BRPM | BODY MASS INDEX: 37.12 KG/M2 | SYSTOLIC BLOOD PRESSURE: 116 MMHG

## 2023-11-26 DIAGNOSIS — J45.31 MILD PERSISTENT ASTHMA WITH ACUTE EXACERBATION: ICD-10-CM

## 2023-11-26 DIAGNOSIS — B96.89 BACTERIAL VAGINOSIS: ICD-10-CM

## 2023-11-26 DIAGNOSIS — N89.8 VAGINAL IRRITATION: ICD-10-CM

## 2023-11-26 DIAGNOSIS — R35.0 URINARY FREQUENCY: ICD-10-CM

## 2023-11-26 DIAGNOSIS — N76.0 BACTERIAL VAGINOSIS: ICD-10-CM

## 2023-11-26 DIAGNOSIS — R19.8 PEPTIC ULCER SYMPTOMS: ICD-10-CM

## 2023-11-26 LAB
APPEARANCE UR: CLEAR
BILIRUB UR STRIP-MCNC: NEGATIVE MG/DL
CANDIDA DNA VAG QL PROBE+SIG AMP: NEGATIVE
COLOR UR AUTO: YELLOW
G VAGINALIS DNA VAG QL PROBE+SIG AMP: POSITIVE
GLUCOSE UR STRIP.AUTO-MCNC: NEGATIVE MG/DL
KETONES UR STRIP.AUTO-MCNC: NEGATIVE MG/DL
LEUKOCYTE ESTERASE UR QL STRIP.AUTO: NEGATIVE
NITRITE UR QL STRIP.AUTO: NEGATIVE
PH UR STRIP.AUTO: 5.5 [PH] (ref 5–8)
POCT INT CON NEG: NEGATIVE
POCT INT CON POS: POSITIVE
POCT URINE PREGNANCY TEST: NEGATIVE
PROT UR QL STRIP: NEGATIVE MG/DL
RBC UR QL AUTO: NEGATIVE
SP GR UR STRIP.AUTO: 1.01
T VAGINALIS DNA VAG QL PROBE+SIG AMP: NEGATIVE
UROBILINOGEN UR STRIP-MCNC: 0.2 MG/DL

## 2023-11-26 PROCEDURE — 99214 OFFICE O/P EST MOD 30 MIN: CPT | Mod: 25 | Performed by: NURSE PRACTITIONER

## 2023-11-26 PROCEDURE — 87480 CANDIDA DNA DIR PROBE: CPT

## 2023-11-26 PROCEDURE — 87077 CULTURE AEROBIC IDENTIFY: CPT

## 2023-11-26 PROCEDURE — 3078F DIAST BP <80 MM HG: CPT | Performed by: NURSE PRACTITIONER

## 2023-11-26 PROCEDURE — 87086 URINE CULTURE/COLONY COUNT: CPT

## 2023-11-26 PROCEDURE — 81025 URINE PREGNANCY TEST: CPT | Performed by: NURSE PRACTITIONER

## 2023-11-26 PROCEDURE — 87510 GARDNER VAG DNA DIR PROBE: CPT

## 2023-11-26 PROCEDURE — 81002 URINALYSIS NONAUTO W/O SCOPE: CPT | Performed by: NURSE PRACTITIONER

## 2023-11-26 PROCEDURE — 87660 TRICHOMONAS VAGIN DIR PROBE: CPT

## 2023-11-26 PROCEDURE — 3074F SYST BP LT 130 MM HG: CPT | Performed by: NURSE PRACTITIONER

## 2023-11-26 RX ORDER — ALBUTEROL SULFATE 90 UG/1
2 AEROSOL, METERED RESPIRATORY (INHALATION) EVERY 6 HOURS PRN
Qty: 8.5 G | Refills: 1 | Status: SHIPPED | OUTPATIENT
Start: 2023-11-26

## 2023-11-26 RX ORDER — PREDNISONE 20 MG/1
40 TABLET ORAL DAILY
Qty: 10 TABLET | Refills: 0 | Status: SHIPPED | OUTPATIENT
Start: 2023-11-26 | End: 2023-12-01

## 2023-11-26 RX ORDER — OMEPRAZOLE 40 MG/1
40 CAPSULE, DELAYED RELEASE ORAL DAILY
Qty: 14 CAPSULE | Refills: 0 | Status: SHIPPED | OUTPATIENT
Start: 2023-11-26 | End: 2023-11-26

## 2023-11-26 RX ORDER — ALBUTEROL SULFATE 2.5 MG/3ML
2.5 SOLUTION RESPIRATORY (INHALATION) EVERY 4 HOURS PRN
Qty: 25 ML | Refills: 1 | Status: SHIPPED | OUTPATIENT
Start: 2023-11-26

## 2023-11-26 RX ORDER — OMEPRAZOLE 40 MG/1
40 CAPSULE, DELAYED RELEASE ORAL DAILY
Qty: 14 CAPSULE | Refills: 0 | Status: SHIPPED | OUTPATIENT
Start: 2023-11-26 | End: 2023-12-10

## 2023-11-26 NOTE — PROGRESS NOTES
Date: 11/26/23     Chief Complaint:    Chief Complaint   Patient presents with    Abdominal Pain    RUQ Pain    Vaginal Discharge        History of Present Illness: 43 y.o.  female presents to clinic with 3 separate issues.     Right upper quadrant pain: Patient states over the past 5 to 7 days she has had intermittent right upper quadrant pain.  She states the pain is worse when her stomach is empty.  She states that she has taken some antacids during the pain and it has completely alleviated the pain.  Patient states she does take ibuprofen intermittently although not excessively.  She denies any severe abdominal pain nausea vomiting diarrhea.  She denies any bloody or black bowel movements.  She states she has had an ulcer when she was younger and this does not feel similar.     Vaginal discharge and irritation: Patient reports that she has had several days of vaginal itching and a change in discharge.  She denies the possibility of STI STD she has had no new sexual partners.  She states her previous sexual partner did have a history of herpes although she denies any painful lesions.  She does have some burning with urination although attributes it to the vaginal irritation.  She does have some urinary frequency as well.  She denies any pelvic pain, flank pain fevers or body aches.    Wheezing: Patient reports history of asthma.  She states she is recently relocated back into this area.  She states over the past week or so she has noticed she has needed her albuterol inhaler more often and is requesting a refill.  She is having nighttime awakenings.  She states that she carries her albuterol inhaler with her and then she also has a nebulizer at home that she uses.  She denies any fevers body aches nausea vomiting diarrhea.  No severe shortness of breath chest pain or lower leg swelling.    ROS:    As stated in HPI     Medical/SX/ Social History:  Reviewed per chart    Pertinent Medications:    Current Outpatient  Medications on File Prior to Visit   Medication Sig Dispense Refill    albuterol (VENTOLIN HFA) 108 (90 Base) MCG/ACT Aero Soln inhalation aerosol INHALE TWO PUFFS BY MOUTH EVERY 6 HOURS AS NEEDED FOR SHORTNESS OF BREATH 6.7 g 3     No current facility-administered medications on file prior to visit.        Allergies:    Penicillins and Vicodin [hydrocodone-acetaminophen]     Problem list, medications, and allergies reviewed by myself today in Epic     Physical Exam:    Vitals:    11/26/23 0948   BP: 116/74   Pulse: (!) 110   Resp: 16   Temp: 36.1 °C (96.9 °F)   SpO2: 97%             Physical Exam  Constitutional:       General: She is not in acute distress.     Appearance: Normal appearance. She is well-developed and normal weight. She is not ill-appearing, toxic-appearing or diaphoretic.   HENT:      Head: Normocephalic and atraumatic.   Eyes:      General: Lids are normal. Gaze aligned appropriately. No allergic shiner or scleral icterus.     Extraocular Movements: Extraocular movements intact.      Conjunctiva/sclera: Conjunctivae normal.   Cardiovascular:      Rate and Rhythm: Normal rate and regular rhythm.      Pulses:           Radial pulses are 2+ on the right side and 2+ on the left side.      Heart sounds: Normal heart sounds.   Pulmonary:      Effort: Pulmonary effort is normal.      Breath sounds: Normal air entry. Wheezing present. No decreased breath sounds, rhonchi or rales.      Comments: Expiratory wheezing throughout  Abdominal:      General: Abdomen is flat. Bowel sounds are normal.      Palpations: Abdomen is soft.      Tenderness: There is no abdominal tenderness. There is no right CVA tenderness, left CVA tenderness, guarding or rebound.   Musculoskeletal:      Right lower leg: No edema.      Left lower leg: No edema.   Skin:     General: Skin is warm.      Capillary Refill: Capillary refill takes less than 2 seconds.      Coloration: Skin is not cyanotic or pale.   Neurological:      Mental  Status: She is alert and oriented to person, place, and time.      Gait: Gait is intact.   Psychiatric:         Behavior: Behavior normal. Behavior is cooperative.                  Diagnostics:      Results for orders placed or performed in visit on 11/26/23   POCT Urinalysis   Result Value Ref Range    POC Color yellow Negative    POC Appearance clear Negative    POC Glucose negative Negative mg/dL    POC Bilirubin negative Negative mg/dL    POC Ketones negative Negative mg/dL    POC Specific Gravity 1.010 <1.005 - >1.030    POC Blood negative Negative    POC Urine PH 5.5 5.0 - 8.0    POC Protein negative Negative mg/dL    POC Urobiligen 0.2 Negative (0.2) mg/dL    POC Nitrites negative Negative    POC Leukocyte Esterase negative Negative   POCT PREGNANCY   Result Value Ref Range    POC Urine Pregnancy Test Negative     Internal Control Positive Positive     Internal Control Negative Negative      Urine culture pending  Vaginal pathogen swab pending    Diagnostics interpreted by myself     Medical Decision making and plan :  I personally reviewed prior external notes and test results pertinent to today's visit. Pt is clinically stable at today's acute urgent care visit.  Patient appears nontoxic with no acute distress noted. Appropriate for outpatient care at this time. The patient remained stable during the urgent care visit.     Pleasant 43 y.o. female presented clinic with with 3 separate complaints.     Right upper quadrant pain: No signs of acute abdomen on exam.  HPI exam findings consistent with ulcer symptoms.  Fortunately patient has had no severe pain bloody or black diarrhea.  No systemic symptoms.  Using shared decision making we will use Prilosec for 14 days.  She will be referred to gastroenterology.  Did discuss strict ER guidelines.     Vaginal discharge: Discussed with patient that HPI is consistent with vaginal yeast infections versus bacterial vaginosis.  Patient denies possibility of gonorrhea  or chlamydia.  Urinalysis is negative,  pregnancy negative, will send for culture out of caution.  Did obtain a vaginal pathogen swab discussed that if results indicate treatment is needed we will discuss her MyChart.  Patient did verbalize understanding.  Fortunately no signs of acute abdomen on exam.     Asthma: Patient presents to clinic with chronic asthma with acute exacerbation.  5-day burst of prednisone sent to the patient's pharmacy advised to take in the morning.  Did refill patient's albuterol inhaler as well as nebulizer.  Noted about serial etiology on exam note indicating need antibiotics at this time.  If symptoms or not improving return to clinic for reevaluation.  Shared decision-making was utilized with patient for treatment plan.  Differential Diagnosis, natural history, and supportive care discussed.      1. Peptic ulcer symptoms    - Referral to Gastroenterology  - omeprazole (PRILOSEC) 40 MG delayed-release capsule; Take 1 Capsule by mouth every day for 14 days.  Dispense: 14 Capsule; Refill: 0    2. Urinary frequency    - POCT Urinalysis  - POCT PREGNANCY  - URINE CULTURE(NEW); Future    3. Vaginal irritation    - VAGINAL PATHOGENS DNA PANEL; Future  - Referral to Gynecology    4. Mild persistent asthma with acute exacerbation    - albuterol 108 (90 Base) MCG/ACT Aero Soln inhalation aerosol; Inhale 2 Puffs every 6 hours as needed for Shortness of Breath.  Dispense: 8.5 g; Refill: 1  - albuterol (PROVENTIL) 2.5mg/3ml Nebu Soln solution for nebulization; Take 3 mL by nebulization every four hours as needed for Shortness of Breath. AS NEEDED FOR SHORTNESS OF BREATH  Dispense: 25 mL; Refill: 1  - predniSONE (DELTASONE) 20 MG Tab; Take 2 Tablets by mouth every day for 5 days.  Dispense: 10 Tablet; Refill: 0        Medication discussed included indication for use and the potential benefits and side effects. Education was provided regarding the aforementioned assessments.  All of the patient's  questions were answered to their satisfaction at the time of discharge. Patient was encouraged to monitor symptoms closely. Those signs and symptoms which would warrant concern and mandate seeking a higher level of service through the emergency department discussed at length.  Patient stated agreement and understanding of this plan of care.    Disposition:  Home in stable condition       Voice Recognition Disclaimer:  Portions of this document were created using voice recognition software. The software does have a chance of producing errors of grammar and possibly content. I have made every reasonable attempt to correct obvious errors, but there may be errors of grammar and possibly content that I did not discover before finalizing the documentation.    RONN Carter.

## 2023-11-27 RX ORDER — METRONIDAZOLE 500 MG/1
500 TABLET ORAL 2 TIMES DAILY
Qty: 14 TABLET | Refills: 0 | Status: SHIPPED | OUTPATIENT
Start: 2023-11-27 | End: 2023-12-04

## 2023-11-28 LAB
BACTERIA UR CULT: NORMAL
SIGNIFICANT IND 70042: NORMAL
SITE SITE: NORMAL
SOURCE SOURCE: NORMAL

## 2024-08-27 NOTE — ED NOTES
Attempted to call pt  No answer  Lvm to rtc to clinic in regards to labs   Pt oxygen sat= 87-94% on room air, pt denies oxygen use at home; dx with asthma and COPD, pt reports she is still an everyday smoker